# Patient Record
Sex: MALE | Race: WHITE | Employment: OTHER | URBAN - METROPOLITAN AREA
[De-identification: names, ages, dates, MRNs, and addresses within clinical notes are randomized per-mention and may not be internally consistent; named-entity substitution may affect disease eponyms.]

---

## 2019-05-26 ENCOUNTER — HOSPITAL ENCOUNTER (EMERGENCY)
Age: 79
Discharge: HOME | End: 2019-05-26
Attending: EMERGENCY MEDICINE
Payer: MEDICARE

## 2019-05-26 ENCOUNTER — APPOINTMENT (OUTPATIENT)
Dept: GENERAL RADIOLOGY | Age: 79
End: 2019-05-26
Payer: MEDICARE

## 2019-05-26 VITALS
DIASTOLIC BLOOD PRESSURE: 98 MMHG | HEIGHT: 67 IN | BODY MASS INDEX: 27.31 KG/M2 | RESPIRATION RATE: 18 BRPM | HEART RATE: 69 BPM | SYSTOLIC BLOOD PRESSURE: 184 MMHG | WEIGHT: 174 LBS | TEMPERATURE: 97.8 F | OXYGEN SATURATION: 94 %

## 2019-05-26 DIAGNOSIS — I50.9 CHRONIC CONGESTIVE HEART FAILURE, UNSPECIFIED HEART FAILURE TYPE (HCC): Primary | ICD-10-CM

## 2019-05-26 LAB
ANION GAP SERPL CALCULATED.3IONS-SCNC: 10 MMOL/L (ref 7–16)
APTT: 41.8 SEC (ref 24.5–35.1)
BASOPHILS ABSOLUTE: 0.04 E9/L (ref 0–0.2)
BASOPHILS RELATIVE PERCENT: 0.7 % (ref 0–2)
BUN BLDV-MCNC: 21 MG/DL (ref 8–23)
CALCIUM SERPL-MCNC: 9.4 MG/DL (ref 8.6–10.2)
CHLORIDE BLD-SCNC: 106 MMOL/L (ref 98–107)
CO2: 23 MMOL/L (ref 22–29)
CREAT SERPL-MCNC: 1.3 MG/DL (ref 0.7–1.2)
EKG ATRIAL RATE: 144 BPM
EKG Q-T INTERVAL: 484 MS
EKG QRS DURATION: 130 MS
EKG QTC CALCULATION (BAZETT): 514 MS
EKG R AXIS: 41 DEGREES
EKG T AXIS: -37 DEGREES
EKG VENTRICULAR RATE: 68 BPM
EOSINOPHILS ABSOLUTE: 0.12 E9/L (ref 0.05–0.5)
EOSINOPHILS RELATIVE PERCENT: 2.1 % (ref 0–6)
GFR AFRICAN AMERICAN: 59
GFR AFRICAN AMERICAN: >60
GFR NON-AFRICAN AMERICAN: 49 ML/MIN/1.73
GFR NON-AFRICAN AMERICAN: 53 ML/MIN/1.73
GLUCOSE BLD-MCNC: 117 MG/DL (ref 74–99)
GLUCOSE BLD-MCNC: 118 MG/DL (ref 74–99)
HCT VFR BLD CALC: 39 % (ref 37–54)
HEMOGLOBIN: 12.3 G/DL (ref 12.5–16.5)
IMMATURE GRANULOCYTES #: 0.04 E9/L
IMMATURE GRANULOCYTES %: 0.7 % (ref 0–5)
LYMPHOCYTES ABSOLUTE: 1.13 E9/L (ref 1.5–4)
LYMPHOCYTES RELATIVE PERCENT: 19.4 % (ref 20–42)
MCH RBC QN AUTO: 29.2 PG (ref 26–35)
MCHC RBC AUTO-ENTMCNC: 31.5 % (ref 32–34.5)
MCV RBC AUTO: 92.6 FL (ref 80–99.9)
MONOCYTES ABSOLUTE: 0.61 E9/L (ref 0.1–0.95)
MONOCYTES RELATIVE PERCENT: 10.5 % (ref 2–12)
NEUTROPHILS ABSOLUTE: 3.89 E9/L (ref 1.8–7.3)
NEUTROPHILS RELATIVE PERCENT: 66.6 % (ref 43–80)
PDW BLD-RTO: 15.7 FL (ref 11.5–15)
PERFORMED ON: ABNORMAL
PLATELET # BLD: 171 E9/L (ref 130–450)
PMV BLD AUTO: 11.2 FL (ref 7–12)
POC CHLORIDE: 113 MMOL/L (ref 100–108)
POC CREATININE: 1.4 MG/DL (ref 0.7–1.2)
POC POTASSIUM: 4.9 MMOL/L (ref 3.5–5)
POC SODIUM: 142 MMOL/L (ref 132–146)
POTASSIUM SERPL-SCNC: 5 MMOL/L (ref 3.5–5)
PRO-BNP: 8475 PG/ML (ref 0–450)
RBC # BLD: 4.21 E12/L (ref 3.8–5.8)
SODIUM BLD-SCNC: 139 MMOL/L (ref 132–146)
TROPONIN: <0.01 NG/ML (ref 0–0.03)
WBC # BLD: 5.8 E9/L (ref 4.5–11.5)

## 2019-05-26 PROCEDURE — 93010 ELECTROCARDIOGRAM REPORT: CPT | Performed by: INTERNAL MEDICINE

## 2019-05-26 PROCEDURE — 71045 X-RAY EXAM CHEST 1 VIEW: CPT

## 2019-05-26 PROCEDURE — 80048 BASIC METABOLIC PNL TOTAL CA: CPT

## 2019-05-26 PROCEDURE — 96374 THER/PROPH/DIAG INJ IV PUSH: CPT

## 2019-05-26 PROCEDURE — 85730 THROMBOPLASTIN TIME PARTIAL: CPT

## 2019-05-26 PROCEDURE — 82565 ASSAY OF CREATININE: CPT

## 2019-05-26 PROCEDURE — 99285 EMERGENCY DEPT VISIT HI MDM: CPT

## 2019-05-26 PROCEDURE — 84295 ASSAY OF SERUM SODIUM: CPT

## 2019-05-26 PROCEDURE — 82947 ASSAY GLUCOSE BLOOD QUANT: CPT

## 2019-05-26 PROCEDURE — 83880 ASSAY OF NATRIURETIC PEPTIDE: CPT

## 2019-05-26 PROCEDURE — 36415 COLL VENOUS BLD VENIPUNCTURE: CPT

## 2019-05-26 PROCEDURE — 84484 ASSAY OF TROPONIN QUANT: CPT

## 2019-05-26 PROCEDURE — 2580000003 HC RX 258: Performed by: EMERGENCY MEDICINE

## 2019-05-26 PROCEDURE — 93005 ELECTROCARDIOGRAM TRACING: CPT | Performed by: NURSE PRACTITIONER

## 2019-05-26 PROCEDURE — 82435 ASSAY OF BLOOD CHLORIDE: CPT

## 2019-05-26 PROCEDURE — 84132 ASSAY OF SERUM POTASSIUM: CPT

## 2019-05-26 PROCEDURE — 6360000002 HC RX W HCPCS: Performed by: EMERGENCY MEDICINE

## 2019-05-26 PROCEDURE — 85025 COMPLETE CBC W/AUTO DIFF WBC: CPT

## 2019-05-26 RX ORDER — FUROSEMIDE 10 MG/ML
40 INJECTION INTRAMUSCULAR; INTRAVENOUS ONCE
Status: COMPLETED | OUTPATIENT
Start: 2019-05-26 | End: 2019-05-26

## 2019-05-26 RX ORDER — FUROSEMIDE 40 MG/1
40 TABLET ORAL DAILY
Qty: 30 TABLET | Refills: 3 | Status: SHIPPED | OUTPATIENT
Start: 2019-05-26

## 2019-05-26 RX ORDER — SODIUM CHLORIDE 0.9 % (FLUSH) 0.9 %
10 SYRINGE (ML) INJECTION PRN
Status: DISCONTINUED | OUTPATIENT
Start: 2019-05-26 | End: 2019-05-26 | Stop reason: HOSPADM

## 2019-05-26 RX ADMIN — Medication 10 ML: at 10:12

## 2019-05-26 RX ADMIN — FUROSEMIDE 40 MG: 10 INJECTION, SOLUTION INTRAMUSCULAR; INTRAVENOUS at 10:12

## 2019-05-26 NOTE — ED NOTES
Bed: 14B-14  Expected date:   Expected time:   Means of arrival:   Comments:  triage     Ashley Campa RN  05/26/19 3613

## 2019-05-26 NOTE — ED PROVIDER NOTES
Department of Emergency Medicine   ED  Provider Note  Admit Date/RoomTime: 5/26/2019  8:55 AM  [facility]          History of Present Illness:  5/26/19, Time: 9:06 AM         Ruben Haney is a 78 y.o. male presenting to the ED for shortness of breath, beginning yesterday. The complaint has been persistent, moderate in severity, and worsened by nothing. He is usually on lasix for CHF but ran out on Friday and his pharmacy was unable to fill it for him. He denies any weight gain or orthopnea. Review of Systems:   Pertinent positives and negatives are stated within HPI, all other systems reviewed and are negative.        --------------------------------------------- PAST HISTORY ---------------------------------------------  Past Medical History:  has a past medical history of A-fib ([facility] A-fib ([facility] Arthritis, CAD (coronary artery disease), CHF (congestive heart failure) ([facility] Diastolic heart failure ([facility] Hyperlipidemia, Hypertension, and Unspecified cerebral artery occlusion with cerebral infarction. Past Surgical History:  has a past surgical history that includes Coronary angioplasty with stent (7-); Coronary angioplasty with stent (July 2014); Cardioversion (11-); Colonoscopy; hernia repair; Abdomen surgery; eye surgery; Appendectomy; and Carotid endarterectomy (Left, 04/16/2015). Social History:  reports that he quit smoking about 18 years ago. His smoking use included cigarettes. He started smoking about 63 years ago. He smoked 0.25 packs per day. He has never used smokeless tobacco. He reports that he drinks alcohol. He reports that he does not use drugs. Family History: family history includes Cancer in his sister; Heart Disease in his brother, brother, brother, father, sister, and sister. The patients home medications have been reviewed.     Allergies: Penicillins and Sulfa antibiotics        ---------------------------------------------------PHYSICAL EXAM--------------------------------------    Constitutional/General: Alert and oriented x3  Head: Normocephalic and atraumatic  Eyes: PERRL, EOMI, conjunctiva normal, sclera non icteric  Mouth: Oropharynx clear, handling secretions, no trismus, no asymmetry of the posterior oropharynx or uvular edema  Neck: Supple, full ROM, no stridor, no meningeal signs  Respiratory: Lungs clear to auscultation bilaterally, no wheezes, rales, or rhonchi. Not in respiratory distress  Cardiovascular:  Regular rate. Regular rhythm. No murmurs, no aortic murmurs, no gallops, or rubs. 2+ distal pulses. Equal extremity pulses. Chest: No chest wall tenderness  GI:  Abdomen Soft, Non tender, Non distended. +BS. No rebound, guarding, or rigidity. No pulsatile masses. Musculoskeletal: Moves all extremities x 4. Warm and well perfused, no clubbing, cyanosis, or edema. Capillary refill <3 seconds  Integument: skin warm and dry. No rashes. Neurologic: GCS 15, no focal deficits, symmetric strength 5/5 in the upper and lower extremities bilaterally  Psychiatric: Normal Affect          -------------------------------------------------- RESULTS -------------------------------------------------  I have personally reviewed all laboratory and imaging results for this patient. Results are listed below.      LABS:  Results for orders placed or performed during the hospital encounter of 05/26/19   CBC auto differential   Result Value Ref Range    WBC 5.8 4.5 - 11.5 E9/L    RBC 4.21 3.80 - 5.80 E12/L    Hemoglobin 12.3 (L) 12.5 - 16.5 g/dL    Hematocrit 39.0 37.0 - 54.0 %    MCV 92.6 80.0 - 99.9 fL    MCH 29.2 26.0 - 35.0 pg    MCHC 31.5 (L) 32.0 - 34.5 %    RDW 15.7 (H) 11.5 - 15.0 fL    Platelets 940 744 - 238 E9/L    MPV 11.2 7.0 - 12.0 fL    Neutrophils % 66.6 43.0 - 80.0 %    Immature Granulocytes % 0.7 0.0 - 5.0 %    Lymphocytes % 19.4 (L) 20.0 - 42.0 %    Monocytes % 10.5 2.0 - 12.0 %    Eosinophils % 2.1 0.0 - 6.0 %    Basophils % 0.7 0.0 - 2.0 %    Neutrophils # 3.89 1.80 - 7.30 E9/L    Immature Granulocytes # 0.04 E9/L    Lymphocytes # 1.13 (L) 1.50 - 4.00 E9/L    Monocytes # 0.61 0.10 - 0.95 E9/L    Eosinophils # 0.12 0.05 - 0.50 E9/L    Basophils # 0.04 0.00 - 0.20 A8/X   Basic metabolic panel   Result Value Ref Range    Sodium 139 132 - 146 mmol/L    Potassium 5.0 3.5 - 5.0 mmol/L    Chloride 106 98 - 107 mmol/L    CO2 23 22 - 29 mmol/L    Anion Gap 10 7 - 16 mmol/L    Glucose 117 (H) 74 - 99 mg/dL    BUN 21 8 - 23 mg/dL    CREATININE 1.3 (H) 0.7 - 1.2 mg/dL    GFR Non-African American 53 >=60 mL/min/1.73    GFR African American >60     Calcium 9.4 8.6 - 10.2 mg/dL   Troponin   Result Value Ref Range    Troponin <0.01 0.00 - 0.03 ng/mL   Brain Natriuretic Peptide   Result Value Ref Range    Pro-BNP 8,475 (H) 0 - 450 pg/mL   APTT   Result Value Ref Range    aPTT 41.8 (H) 24.5 - 35.1 sec   POCT Venous   Result Value Ref Range    POC Sodium 142 132 - 146 mmol/L    POC Potassium 4.9 3.5 - 5.0 mmol/L    POC Chloride 113 (H) 100 - 108 mmol/L    POC Glucose 118 (H) 74 - 99 mg/dl    POC Creatinine 1.4 (H) 0.7 - 1.2 mg/dL    GFR Non-African American 49 >=60 mL/min/1.73    GFR  59     Performed on SEE BELOW    EKG 12 Lead   Result Value Ref Range    Ventricular Rate 68 BPM    Atrial Rate 144 BPM    QRS Duration 130 ms    Q-T Interval 484 ms    QTc Calculation (Bazett) 514 ms    R Axis 41 degrees    T Axis -37 degrees       RADIOLOGY:  Interpreted by Radiologist unless otherwise specified  XR CHEST 1 VW   Final Result   Cardiomegaly   Tortuous aorta   There are patchy infiltrates seen throughout both the lung fields.    Pulmonary vascular congestion could have this appearance   There is a mild infiltrate at the right lung                      EKG Interpretation    Interpreted by emergency department physician    Rhythm: normal sinus   Rate: normal  Axis: normal  Ectopy: none  Conduction: normal  ST Segments: no acute change  T Waves: no computerized transcription errors may be present       Raghu Lawson DO  Resident  05/26/19 1100

## 2019-05-26 NOTE — ED NOTES
The patient was ambulated down the abrth with no assistance needed, SP02 was 97 percent and HR was 80 bpm, the patient denied any chest pain, shortness of breath, also denied any dizziness or lightheadedness, no palpitations, dr. Robert Cobb and dr. Erika Campoverde notified      Edi Miguel RN  05/26/19 1189

## 2019-10-18 DIAGNOSIS — I65.23 BILATERAL CAROTID ARTERY STENOSIS: Primary | ICD-10-CM

## 2019-11-05 ENCOUNTER — HOSPITAL ENCOUNTER (OUTPATIENT)
Dept: CARDIOLOGY | Age: 79
Discharge: HOME | End: 2019-11-05
Payer: MEDICARE

## 2019-11-05 ENCOUNTER — OFFICE VISIT (OUTPATIENT)
Dept: VASCULAR SURGERY | Age: 79
End: 2019-11-05
Payer: MEDICARE

## 2019-11-05 VITALS
DIASTOLIC BLOOD PRESSURE: 82 MMHG | HEIGHT: 69 IN | WEIGHT: 169 LBS | BODY MASS INDEX: 25.03 KG/M2 | SYSTOLIC BLOOD PRESSURE: 120 MMHG

## 2019-11-05 DIAGNOSIS — I65.23 BILATERAL CAROTID ARTERY STENOSIS: ICD-10-CM

## 2019-11-05 DIAGNOSIS — I65.23 CAROTID STENOSIS, ASYMPTOMATIC, BILATERAL: Primary | ICD-10-CM

## 2019-11-05 PROCEDURE — 93880 EXTRACRANIAL BILAT STUDY: CPT

## 2019-11-05 PROCEDURE — 99213 OFFICE O/P EST LOW 20 MIN: CPT | Performed by: PHYSICIAN ASSISTANT

## 2021-10-21 ENCOUNTER — TELEPHONE (OUTPATIENT)
Dept: VASCULAR SURGERY | Age: 81
End: 2021-10-21

## 2021-10-27 ENCOUNTER — TELEPHONE (OUTPATIENT)
Dept: VASCULAR SURGERY | Age: 81
End: 2021-10-27

## 2021-10-27 DIAGNOSIS — I65.23 CAROTID STENOSIS, ASYMPTOMATIC, BILATERAL: Primary | ICD-10-CM

## 2021-10-27 NOTE — TELEPHONE ENCOUNTER
Notified patient's son Lynne Speaker of appointment for US at [facility] on 11-17-21 at 2:00 pm, arrive at 1:30 pm to register, see Dr. Saba Benavidez on 11-23-21 at 10:15 am.

## 2021-11-17 ENCOUNTER — HOSPITAL ENCOUNTER (OUTPATIENT)
Dept: ULTRASOUND IMAGING | Age: 81
Discharge: HOME | End: 2021-11-19
Payer: MEDICARE

## 2021-11-17 DIAGNOSIS — I65.23 CAROTID STENOSIS, ASYMPTOMATIC, BILATERAL: ICD-10-CM

## 2021-11-17 PROCEDURE — 93880 EXTRACRANIAL BILAT STUDY: CPT

## 2021-11-22 ENCOUNTER — TELEPHONE (OUTPATIENT)
Dept: VASCULAR SURGERY | Age: 81
End: 2021-11-22

## 2021-11-22 NOTE — TELEPHONE ENCOUNTER
Called to confirm appointment for 11/23/21 at 1015 a.m, left message with date, time, and phone number for patient.

## 2021-11-23 ENCOUNTER — OFFICE VISIT (OUTPATIENT)
Dept: VASCULAR SURGERY | Age: 81
End: 2021-11-23
Payer: MEDICARE

## 2021-11-23 VITALS — BODY MASS INDEX: 26.84 KG/M2 | WEIGHT: 167 LBS | HEIGHT: 66 IN

## 2021-11-23 DIAGNOSIS — I65.23 CAROTID STENOSIS, ASYMPTOMATIC, BILATERAL: Primary | ICD-10-CM

## 2021-11-23 PROCEDURE — 99212 OFFICE O/P EST SF 10 MIN: CPT | Performed by: NURSE PRACTITIONER

## 2021-11-23 NOTE — PROGRESS NOTES
Vascular Surgery Outpatient Progress Note      Chief Complaint   Patient presents with    Circulatory Problem     carotid stenosis asymptomatic bilateral       HISTORY OF PRESENT ILLNESS:                The patient is a 80 y.o. male who returns for follow-up evaluation of carotid artery disease and previous L CEA in 2015. He denies any symptoms of stroke, ministroke, or amaurosis fugax. He denies any recent hospital admission, major illness, or surgery since the last office visit. He had a recent carotid ultrasound that was reviewed for today's visit. Past Medical History:        Diagnosis Date    A-fib ([address]     A-fib ([address]     presently on coumadin    Arthritis     CAD (coronary artery disease)     [facility]     Diastolic heart failure ([address] 7/23/14 7/23/14- echocardiogram revealed an LVEF of 55-60%, left atrium severely dilated, mild mitral and mild tricuspid regurgitation    Hyperlipidemia     Hypertension     Unspecified cerebral artery occlusion with cerebral infarction      Past Surgical History:        Procedure Laterality Date    ABDOMEN SURGERY      PERFORATED BOWEL    APPENDECTOMY      CARDIOVERSION  11-    Dr. Janelle Puri 200 joules    CAROTID ENDARTERECTOMY Left 04/16/2015    Vasoguard patch, [place]    COLONOSCOPY      CORONARY ANGIOPLASTY WITH STENT PLACEMENT  7-    3.0 x 26mm Integrity to Prox circ   Dr. Latina Schirmer  July 2014    EYE SURGERY      CATARACT BILATERALLY    HERNIA REPAIR       Current Medications:   Prior to Admission medications    Medication Sig Start Date End Date Taking? Authorizing Provider   furosemide (LASIX) 40 MG tablet Take 1 tablet by mouth daily 5/26/19  Yes Wyona Reveal, DO   losartan (COZAAR) 50 MG tablet Take 50 mg by mouth daily  7/30/15  Yes Historical Provider, MD   aspirin EC 81 MG EC tablet Take 81 mg by mouth daily.    Yes Historical Provider, MD furosemide (LASIX) 40 MG tablet Take 1 tablet by mouth daily. 8/10/14  Yes Jamin Carrasco DO   potassium chloride SA (K-DUR;KLOR-CON M) 20 MEQ tablet Take 1 tablet by mouth daily (with breakfast). 8/10/14  Yes Jamin Carrasco DO   warfarin (COUMADIN) 2.5 MG tablet Take 1 tablet by mouth daily. Patient taking differently: Take 2.5 mg by mouth daily. Indications: TAKES 5 MG ON , MONDAY, WEDNESDAY, THURSDAY AND SATURDAY. TAKES 2.5 MG ON TUESDAY AND 14  Yes Jamin Carrasco DO   atorvastatin (LIPITOR) 20 MG tablet Take 1 tablet by mouth nightly. 14  Yes Jamin Carrasco DO   lisinopril (PRINIVIL;ZESTRIL) 5 MG tablet Take 1 tablet by mouth daily. 14  Yes Jamin Carrasco DO   metoprolol (TOPROL-XL) 50 MG XL tablet Take 1 tablet by mouth every evening. Patient taking differently: Take 50 mg by mouth daily. 14  Yes Jamin Carrasco DO   pantoprazole (PROTONIX) 40 MG tablet Take 1 tablet by mouth every morning (before breakfast). 14  Yes Jamin Carrasco DO   Vitamin D (CHOLECALCIFEROL) 1000 UNITS CAPS capsule Take 1,000 Units by mouth daily. Yes Historical Provider, MD     Allergies:  Penicillins and Sulfa antibiotics    Social History     Socioeconomic History    Marital status:       Spouse name: Not on file    Number of children: Not on file    Years of education: Not on file    Highest education level: Not on file   Occupational History    Not on file   Tobacco Use    Smoking status: Former Smoker     Packs/day: 0.25     Types: Cigarettes     Start date: 1955     Quit date: 2001     Years since quittin.8    Smokeless tobacco: Never Used   Substance and Sexual Activity    Alcohol use: Yes     Comment: glass of wine occasionally    Drug use: No    Sexual activity: Never   Other Topics Concern    Not on file   Social History Narrative    ** Merged History Encounter **          Social Determinants of Health     Financial Resource Strain:     Difficulty of Paying Living Expenses: Not on file   Food Insecurity:     Worried About [facility] in the Last Year: Not on file    Ran Out of Food in the Last Year: Not on file   Transportation Needs:     Lack of Transportation (Medical): Not on file    Lack of Transportation (Non-Medical):  Not on file   Physical Activity:     Days of Exercise per Week: Not on file    Minutes of Exercise per Session: Not on file   Stress:     Feeling of Stress : Not on file   Social Connections:     Frequency of Communication with Friends and Family: Not on file    Frequency of Social Gatherings with Friends and Family: Not on file    Attends Mosque Services: Not on file    Active Member of [address] [place] [address] or Organizations: Not on file    Attends Club or Organization Meetings: Not on file    Marital Status: Not on file   Intimate Partner Violence:     Fear of Current or Ex-Partner: Not on file    Emotionally Abused: Not on file    Physically Abused: Not on file    Sexually Abused: Not on file   Housing Stability:     Unable to Pay for Housing in the Last Year: Not on file    Number of [place] in the Last Year: Not on file    Unstable Housing in the Last Year: Not on file        Family History   Problem Relation Age of Onset    Heart Disease Father     Cancer Sister     Heart Disease Sister     Heart Disease Brother     Heart Disease Sister     Heart Disease Brother     Heart Disease Brother        REVIEW OF SYSTEMS (New symptoms):    Eyes:      Blurred vision:  No [x]/Yes []               Diplopia:   No [x]/Yes []               Vision loss:       No [x]/Yes []   Ears, nose, throat:             Hearing loss:    No [x]/Yes []      Vertigo:   No [x]/Yes []                       Swallowing problem:  No [x]/Yes []               Nose bleeds:   No [x]/Yes []      Voice hoarseness:  No [x]/Yes []  Respiratory:             Cough:   No [x]/Yes []      Pleuritic chest pain:  No [x]/Yes []                        Dyspnea: No [x]/Yes []      Wheezing:   No [x]/Yes []  Cardiovascular:             Angina:   No [x]/Yes []      Palpitations:   No [x]/Yes []          Claudication:    No [x]/Yes []      Leg swelling:   No [x]/Yes []  Gastrointestinal:             Nausea or vomiting:  No [x]/Yes []               Abdominal pain:  No [x]/Yes []                     Intestinal bleeding: No [x]/Yes []  Musculoskeletal:             Leg pain:   No [x]/Yes []      Back pain:   No [x]/Yes []                    Weakness:   No [x]/Yes []  Neurologic:             Numbness:   No [x]/Yes []      Paralysis:   No [x]/Yes []                       Headaches:   No [x]/Yes []  Hematologic, lymphatic:   Anemia:   No [x]/Yes []              Bleeding or bruising:  No [x]/Yes []              Fevers or chills: No [x]/Yes []  Endocrine:             Temp intolerance:   No [x]/Yes []                       Polydipsia, polyuria:  No [x]/Yes []  Skin:              Rash:    No [x]/Yes []      Ulcers:   No [x]/Yes []              Abnorm pigment: No [x]/Yes []  :              Frequency/urgency:  No [x]/Yes []      Hematuria:    No [x]/Yes []                      Incontinence:    No [x]/Yes []    PHYSICAL EXAM:  There were no vitals filed for this visit.   General Appearance: alert and oriented to person, place and time, in no acute distress, well developed and well- nourished  Neurologic: speech normal  Head: normocephalic and atraumatic  Eyes: extraocular eye movements intact, conjunctivae normal  ENT: external ear and ear canal normal bilaterally, nose without deformity  Pulmonary/Chest: normal air movement, no respiratory distress  Cardiovascular: normal rate, regular rhythm  Abdomen: non-distended, no masses  Musculoskeletal: no joint deformity or tenderness  Extremities: no leg edema bilaterally  Skin: warm and dry, no rash or erythema    PULSE EXAM      Right      Left   Brachial     Radial 2 2   Femoral     Popliteal     Dorsalis Pedis     Posterior Tibial (3=normal, 2=diminished, 1=barely palpable, 4=widened)    RADIOLOGY: Carotid US:    Problem List Items Addressed This Visit     Carotid stenosis, asymptomatic, bilateral - Primary        I reviewed the results of the ultrasound with the patient. Less than 50% stenosis of the carotid arteries bilaterally. The patient's ultrasound has been stable for several years. At this point I do not feel that his carotid stenosis will cause him any problems in the future. I will not schedule him a follow up appointment, but asked him to call back with any problems. Pt seen and plan reviewed with Dr. Yang Sousa. KOSTA Escobar - CNP      Return if symptoms worsen or fail to improve.

## 2022-11-24 ENCOUNTER — APPOINTMENT (OUTPATIENT)
Dept: GENERAL RADIOLOGY | Age: 82
DRG: 291 | End: 2022-11-24
Payer: MEDICARE

## 2022-11-24 ENCOUNTER — HOSPITAL ENCOUNTER (INPATIENT)
Age: 82
LOS: 2 days | Discharge: HOME | DRG: 291 | End: 2022-11-26
Attending: EMERGENCY MEDICINE | Admitting: STUDENT IN AN ORGANIZED HEALTH CARE EDUCATION/TRAINING PROGRAM
Payer: MEDICARE

## 2022-11-24 DIAGNOSIS — Z79.01 CHRONIC ANTICOAGULATION: ICD-10-CM

## 2022-11-24 DIAGNOSIS — N17.9 AKI (ACUTE KIDNEY INJURY) (HCC): ICD-10-CM

## 2022-11-24 DIAGNOSIS — R79.1 SUPRATHERAPEUTIC INR: ICD-10-CM

## 2022-11-24 DIAGNOSIS — I48.11 LONGSTANDING PERSISTENT ATRIAL FIBRILLATION (HCC): ICD-10-CM

## 2022-11-24 DIAGNOSIS — I50.9 CONGESTIVE HEART FAILURE, UNSPECIFIED HF CHRONICITY, UNSPECIFIED HEART FAILURE TYPE (HCC): Primary | ICD-10-CM

## 2022-11-24 PROBLEM — D64.9 ANEMIA: Status: ACTIVE | Noted: 2022-11-24

## 2022-11-24 PROBLEM — I50.33 ACUTE ON CHRONIC DIASTOLIC CHF (CONGESTIVE HEART FAILURE) (HCC): Status: ACTIVE | Noted: 2022-11-24

## 2022-11-24 LAB
ALBUMIN SERPL-MCNC: 3.5 G/DL (ref 3.5–5.2)
ALP BLD-CCNC: 61 U/L (ref 40–129)
ALT SERPL-CCNC: 19 U/L (ref 0–40)
ANION GAP SERPL CALCULATED.3IONS-SCNC: 11 MMOL/L (ref 7–16)
APTT: 41.6 SEC (ref 24.5–35.1)
AST SERPL-CCNC: 19 U/L (ref 0–39)
BASOPHILS ABSOLUTE: 0.05 E9/L (ref 0–0.2)
BASOPHILS RELATIVE PERCENT: 0.6 % (ref 0–2)
BILIRUB SERPL-MCNC: 0.5 MG/DL (ref 0–1.2)
BUN BLDV-MCNC: 31 MG/DL (ref 6–23)
CALCIUM SERPL-MCNC: 9.8 MG/DL (ref 8.6–10.2)
CHLORIDE BLD-SCNC: 100 MMOL/L (ref 98–107)
CO2: 26 MMOL/L (ref 22–29)
CREAT SERPL-MCNC: 1.6 MG/DL (ref 0.7–1.2)
EOSINOPHILS ABSOLUTE: 0.1 E9/L (ref 0.05–0.5)
EOSINOPHILS RELATIVE PERCENT: 1.2 % (ref 0–6)
GFR SERPL CREATININE-BSD FRML MDRD: 43 ML/MIN/1.73
GLUCOSE BLD-MCNC: 157 MG/DL (ref 74–99)
HCT VFR BLD CALC: 36.7 % (ref 37–54)
HEMOGLOBIN: 11.9 G/DL (ref 12.5–16.5)
IMMATURE GRANULOCYTES #: 0.04 E9/L
IMMATURE GRANULOCYTES %: 0.5 % (ref 0–5)
INFLUENZA A BY PCR: NOT DETECTED
INFLUENZA B BY PCR: NOT DETECTED
INR BLD: 3.8
LYMPHOCYTES ABSOLUTE: 1.56 E9/L (ref 1.5–4)
LYMPHOCYTES RELATIVE PERCENT: 18.5 % (ref 20–42)
MCH RBC QN AUTO: 29.8 PG (ref 26–35)
MCHC RBC AUTO-ENTMCNC: 32.4 % (ref 32–34.5)
MCV RBC AUTO: 92 FL (ref 80–99.9)
MONOCYTES ABSOLUTE: 0.87 E9/L (ref 0.1–0.95)
MONOCYTES RELATIVE PERCENT: 10.3 % (ref 2–12)
NEUTROPHILS ABSOLUTE: 5.81 E9/L (ref 1.8–7.3)
NEUTROPHILS RELATIVE PERCENT: 68.9 % (ref 43–80)
PDW BLD-RTO: 14.3 FL (ref 11.5–15)
PLATELET # BLD: 246 E9/L (ref 130–450)
PMV BLD AUTO: 10.5 FL (ref 7–12)
POTASSIUM SERPL-SCNC: 4.8 MMOL/L (ref 3.5–5)
PRO-BNP: 7773 PG/ML (ref 0–450)
PROTHROMBIN TIME: 41.6 SEC (ref 9.3–12.4)
RBC # BLD: 3.99 E12/L (ref 3.8–5.8)
RSV BY PCR: NEGATIVE
SARS-COV-2, NAAT: NOT DETECTED
SODIUM BLD-SCNC: 137 MMOL/L (ref 132–146)
TOTAL PROTEIN: 7.6 G/DL (ref 6.4–8.3)
TROPONIN, HIGH SENSITIVITY: 29 NG/L (ref 0–11)
TROPONIN, HIGH SENSITIVITY: 31 NG/L (ref 0–11)
WBC # BLD: 8.4 E9/L (ref 4.5–11.5)

## 2022-11-24 PROCEDURE — 80053 COMPREHEN METABOLIC PANEL: CPT

## 2022-11-24 PROCEDURE — 71045 X-RAY EXAM CHEST 1 VIEW: CPT

## 2022-11-24 PROCEDURE — 36415 COLL VENOUS BLD VENIPUNCTURE: CPT

## 2022-11-24 PROCEDURE — 87635 SARS-COV-2 COVID-19 AMP PRB: CPT

## 2022-11-24 PROCEDURE — 85730 THROMBOPLASTIN TIME PARTIAL: CPT

## 2022-11-24 PROCEDURE — 85610 PROTHROMBIN TIME: CPT

## 2022-11-24 PROCEDURE — 83880 ASSAY OF NATRIURETIC PEPTIDE: CPT

## 2022-11-24 PROCEDURE — 87807 RSV ASSAY W/OPTIC: CPT

## 2022-11-24 PROCEDURE — APPSS45 APP SPLIT SHARED TIME 31-45 MINUTES: Performed by: NURSE PRACTITIONER

## 2022-11-24 PROCEDURE — 93005 ELECTROCARDIOGRAM TRACING: CPT

## 2022-11-24 PROCEDURE — 85025 COMPLETE CBC W/AUTO DIFF WBC: CPT

## 2022-11-24 PROCEDURE — 87502 INFLUENZA DNA AMP PROBE: CPT

## 2022-11-24 PROCEDURE — 99285 EMERGENCY DEPT VISIT HI MDM: CPT

## 2022-11-24 PROCEDURE — 6360000002 HC RX W HCPCS

## 2022-11-24 PROCEDURE — 1200000000 HC SEMI PRIVATE

## 2022-11-24 PROCEDURE — 84484 ASSAY OF TROPONIN QUANT: CPT

## 2022-11-24 PROCEDURE — 96374 THER/PROPH/DIAG INJ IV PUSH: CPT

## 2022-11-24 RX ORDER — FUROSEMIDE 10 MG/ML
40 INJECTION INTRAMUSCULAR; INTRAVENOUS ONCE
Status: COMPLETED | OUTPATIENT
Start: 2022-11-24 | End: 2022-11-24

## 2022-11-24 RX ADMIN — FUROSEMIDE 40 MG: 10 INJECTION, SOLUTION INTRAMUSCULAR; INTRAVENOUS at 23:35

## 2022-11-24 ASSESSMENT — ENCOUNTER SYMPTOMS
DIARRHEA: 0
EYE DISCHARGE: 0
CONSTIPATION: 0
RHINORRHEA: 0
ABDOMINAL DISTENTION: 0
BACK PAIN: 0
ABDOMINAL PAIN: 0
NAUSEA: 0
SHORTNESS OF BREATH: 1
EYE REDNESS: 0
COUGH: 1
SORE THROAT: 0
SINUS PRESSURE: 0
VOMITING: 0
BLOOD IN STOOL: 0
PHOTOPHOBIA: 0
WHEEZING: 0

## 2022-11-25 PROBLEM — I50.9 CONGESTIVE HEART FAILURE (HCC): Status: ACTIVE | Noted: 2022-11-25

## 2022-11-25 PROBLEM — N17.9 AKI (ACUTE KIDNEY INJURY) (HCC): Status: ACTIVE | Noted: 2022-11-25

## 2022-11-25 LAB
ANION GAP SERPL CALCULATED.3IONS-SCNC: 13 MMOL/L (ref 7–16)
BASOPHILS ABSOLUTE: 0.06 E9/L (ref 0–0.2)
BASOPHILS RELATIVE PERCENT: 0.8 % (ref 0–2)
BUN BLDV-MCNC: 28 MG/DL (ref 6–23)
CALCIUM SERPL-MCNC: 9.7 MG/DL (ref 8.6–10.2)
CHLORIDE BLD-SCNC: 102 MMOL/L (ref 98–107)
CO2: 24 MMOL/L (ref 22–29)
CREAT SERPL-MCNC: 1.4 MG/DL (ref 0.7–1.2)
EKG ATRIAL RATE: 288 BPM
EKG Q-T INTERVAL: 468 MS
EKG QRS DURATION: 150 MS
EKG QTC CALCULATION (BAZETT): 478 MS
EKG R AXIS: 36 DEGREES
EKG T AXIS: 4 DEGREES
EKG VENTRICULAR RATE: 63 BPM
EOSINOPHILS ABSOLUTE: 0.13 E9/L (ref 0.05–0.5)
EOSINOPHILS RELATIVE PERCENT: 1.8 % (ref 0–6)
GFR SERPL CREATININE-BSD FRML MDRD: 50 ML/MIN/1.73
GLUCOSE BLD-MCNC: 116 MG/DL (ref 74–99)
HCT VFR BLD CALC: 37.9 % (ref 37–54)
HEMOGLOBIN: 11.9 G/DL (ref 12.5–16.5)
IMMATURE GRANULOCYTES #: 0.03 E9/L
IMMATURE GRANULOCYTES %: 0.4 % (ref 0–5)
INR BLD: 3.3
LYMPHOCYTES ABSOLUTE: 1.52 E9/L (ref 1.5–4)
LYMPHOCYTES RELATIVE PERCENT: 21.1 % (ref 20–42)
MCH RBC QN AUTO: 28.5 PG (ref 26–35)
MCHC RBC AUTO-ENTMCNC: 31.4 % (ref 32–34.5)
MCV RBC AUTO: 90.7 FL (ref 80–99.9)
MONOCYTES ABSOLUTE: 0.95 E9/L (ref 0.1–0.95)
MONOCYTES RELATIVE PERCENT: 13.2 % (ref 2–12)
NEUTROPHILS ABSOLUTE: 4.52 E9/L (ref 1.8–7.3)
NEUTROPHILS RELATIVE PERCENT: 62.7 % (ref 43–80)
PDW BLD-RTO: 14.3 FL (ref 11.5–15)
PLATELET # BLD: 255 E9/L (ref 130–450)
PMV BLD AUTO: 10.4 FL (ref 7–12)
POTASSIUM REFLEX MAGNESIUM: 4 MMOL/L (ref 3.5–5)
PROTHROMBIN TIME: 37.2 SEC (ref 9.3–12.4)
RBC # BLD: 4.18 E12/L (ref 3.8–5.8)
SODIUM BLD-SCNC: 139 MMOL/L (ref 132–146)
WBC # BLD: 7.2 E9/L (ref 4.5–11.5)

## 2022-11-25 PROCEDURE — 36415 COLL VENOUS BLD VENIPUNCTURE: CPT

## 2022-11-25 PROCEDURE — 2580000003 HC RX 258: Performed by: STUDENT IN AN ORGANIZED HEALTH CARE EDUCATION/TRAINING PROGRAM

## 2022-11-25 PROCEDURE — 6370000000 HC RX 637 (ALT 250 FOR IP): Performed by: INTERNAL MEDICINE

## 2022-11-25 PROCEDURE — 93306 TTE W/DOPPLER COMPLETE: CPT

## 2022-11-25 PROCEDURE — 99233 SBSQ HOSP IP/OBS HIGH 50: CPT | Performed by: INTERNAL MEDICINE

## 2022-11-25 PROCEDURE — 99222 1ST HOSP IP/OBS MODERATE 55: CPT | Performed by: STUDENT IN AN ORGANIZED HEALTH CARE EDUCATION/TRAINING PROGRAM

## 2022-11-25 PROCEDURE — 6370000000 HC RX 637 (ALT 250 FOR IP): Performed by: STUDENT IN AN ORGANIZED HEALTH CARE EDUCATION/TRAINING PROGRAM

## 2022-11-25 PROCEDURE — 93010 ELECTROCARDIOGRAM REPORT: CPT | Performed by: INTERNAL MEDICINE

## 2022-11-25 PROCEDURE — 85025 COMPLETE CBC W/AUTO DIFF WBC: CPT

## 2022-11-25 PROCEDURE — 80048 BASIC METABOLIC PNL TOTAL CA: CPT

## 2022-11-25 PROCEDURE — 6360000004 HC RX CONTRAST MEDICATION: Performed by: STUDENT IN AN ORGANIZED HEALTH CARE EDUCATION/TRAINING PROGRAM

## 2022-11-25 PROCEDURE — 85610 PROTHROMBIN TIME: CPT

## 2022-11-25 PROCEDURE — 1200000000 HC SEMI PRIVATE

## 2022-11-25 RX ORDER — PANTOPRAZOLE SODIUM 40 MG/1
40 TABLET, DELAYED RELEASE ORAL
Status: DISCONTINUED | OUTPATIENT
Start: 2022-11-25 | End: 2022-11-26 | Stop reason: HOSPADM

## 2022-11-25 RX ORDER — SODIUM CHLORIDE 0.9 % (FLUSH) 0.9 %
5-40 SYRINGE (ML) INJECTION EVERY 12 HOURS SCHEDULED
Status: DISCONTINUED | OUTPATIENT
Start: 2022-11-25 | End: 2022-11-26 | Stop reason: HOSPADM

## 2022-11-25 RX ORDER — SODIUM CHLORIDE 0.9 % (FLUSH) 0.9 %
5-40 SYRINGE (ML) INJECTION PRN
Status: DISCONTINUED | OUTPATIENT
Start: 2022-11-25 | End: 2022-11-26 | Stop reason: HOSPADM

## 2022-11-25 RX ORDER — METOPROLOL SUCCINATE 50 MG/1
50 TABLET, EXTENDED RELEASE ORAL DAILY
Status: DISCONTINUED | OUTPATIENT
Start: 2022-11-25 | End: 2022-11-26 | Stop reason: HOSPADM

## 2022-11-25 RX ORDER — ASPIRIN 81 MG/1
81 TABLET ORAL DAILY
Status: DISCONTINUED | OUTPATIENT
Start: 2022-11-25 | End: 2022-11-26 | Stop reason: HOSPADM

## 2022-11-25 RX ORDER — WARFARIN SODIUM 2.5 MG/1
2.5 TABLET ORAL DAILY
Status: DISCONTINUED | OUTPATIENT
Start: 2022-11-25 | End: 2022-11-25

## 2022-11-25 RX ORDER — ONDANSETRON 2 MG/ML
4 INJECTION INTRAMUSCULAR; INTRAVENOUS EVERY 6 HOURS PRN
Status: DISCONTINUED | OUTPATIENT
Start: 2022-11-25 | End: 2022-11-26 | Stop reason: HOSPADM

## 2022-11-25 RX ORDER — DOXYCYCLINE HYCLATE 100 MG/1
100 CAPSULE ORAL EVERY 12 HOURS SCHEDULED
Status: DISCONTINUED | OUTPATIENT
Start: 2022-11-25 | End: 2022-11-26 | Stop reason: HOSPADM

## 2022-11-25 RX ORDER — ATORVASTATIN CALCIUM 20 MG/1
20 TABLET, FILM COATED ORAL NIGHTLY
Status: DISCONTINUED | OUTPATIENT
Start: 2022-11-25 | End: 2022-11-26 | Stop reason: HOSPADM

## 2022-11-25 RX ORDER — ACETAMINOPHEN 650 MG/1
650 SUPPOSITORY RECTAL EVERY 6 HOURS PRN
Status: DISCONTINUED | OUTPATIENT
Start: 2022-11-25 | End: 2022-11-26 | Stop reason: HOSPADM

## 2022-11-25 RX ORDER — ONDANSETRON 4 MG/1
4 TABLET, ORALLY DISINTEGRATING ORAL EVERY 8 HOURS PRN
Status: DISCONTINUED | OUTPATIENT
Start: 2022-11-25 | End: 2022-11-26 | Stop reason: HOSPADM

## 2022-11-25 RX ORDER — WARFARIN SODIUM 2.5 MG/1
1.25 TABLET ORAL
Status: COMPLETED | OUTPATIENT
Start: 2022-11-25 | End: 2022-11-25

## 2022-11-25 RX ORDER — SODIUM CHLORIDE 9 MG/ML
INJECTION, SOLUTION INTRAVENOUS PRN
Status: DISCONTINUED | OUTPATIENT
Start: 2022-11-25 | End: 2022-11-26 | Stop reason: HOSPADM

## 2022-11-25 RX ORDER — POLYETHYLENE GLYCOL 3350 17 G/17G
17 POWDER, FOR SOLUTION ORAL DAILY PRN
Status: DISCONTINUED | OUTPATIENT
Start: 2022-11-25 | End: 2022-11-26 | Stop reason: HOSPADM

## 2022-11-25 RX ORDER — ACETAMINOPHEN 325 MG/1
650 TABLET ORAL EVERY 6 HOURS PRN
Status: DISCONTINUED | OUTPATIENT
Start: 2022-11-25 | End: 2022-11-26 | Stop reason: HOSPADM

## 2022-11-25 RX ADMIN — METOPROLOL SUCCINATE 50 MG: 50 TABLET, EXTENDED RELEASE ORAL at 09:47

## 2022-11-25 RX ADMIN — DOXYCYCLINE HYCLATE 100 MG: 100 CAPSULE ORAL at 21:02

## 2022-11-25 RX ADMIN — Medication 10 ML: at 21:02

## 2022-11-25 RX ADMIN — WARFARIN SODIUM 1.25 MG: 2.5 TABLET ORAL at 17:24

## 2022-11-25 RX ADMIN — Medication 10 ML: at 09:47

## 2022-11-25 RX ADMIN — ASPIRIN 81 MG: 81 TABLET, COATED ORAL at 09:47

## 2022-11-25 RX ADMIN — DOXYCYCLINE HYCLATE 100 MG: 100 CAPSULE ORAL at 11:03

## 2022-11-25 RX ADMIN — PERFLUTREN 1.5 ML: 6.52 INJECTION, SUSPENSION INTRAVENOUS at 08:15

## 2022-11-25 RX ADMIN — PANTOPRAZOLE SODIUM 40 MG: 40 TABLET, DELAYED RELEASE ORAL at 06:00

## 2022-11-25 RX ADMIN — ATORVASTATIN CALCIUM 20 MG: 20 TABLET, FILM COATED ORAL at 21:02

## 2022-11-25 ASSESSMENT — PAIN - FUNCTIONAL ASSESSMENT: PAIN_FUNCTIONAL_ASSESSMENT: NONE - DENIES PAIN

## 2022-11-25 ASSESSMENT — PAIN SCALES - GENERAL: PAINLEVEL_OUTOF10: 0

## 2022-11-25 NOTE — PROGRESS NOTES
Called the patient's son Simon Tellez, home medications are updated to the best of this RN's capability.

## 2022-11-25 NOTE — ED NOTES
The following labs were labeled with appropriate pt sticker and tubed to lab:     [x] Blue     [x] Lavender   [] on ice  [x] Green/yellow  [] Green/black [] on ice  [] Loran [place]  [] on ice  [] Yellow  [] Red  [] Type/ Screen  [] ABG  [] VBG    [x] COVID-19 swab    [x] Rapid  [] PCR  [x] Flu swab  [] Peds Viral Panel     [] Urine Sample  [] Fecal Sample  [] Pelvic Cultures  [] Blood Cultures  [] X 2  [] STREP Cultures         Perry Jackson RN  11/24/22 4036

## 2022-11-25 NOTE — PROGRESS NOTES
Pharmacy Consultation Note  (Warfarin Dosing and Monitoring)    Initial consult date: 11/25  Consulting Provider: Spike Sultana is a 80 y.o. male for whom pharmacy has been asked to manage warfarin therapy. Weight:   Wt Readings from Last 1 Encounters:   11/25/22 152 lb 8 oz (69.2 kg)       TSH:    Lab Results   Component Value Date/Time    TSH 1.010 07/29/2014 02:00 AM       Hepatic Function Panel:                            Lab Results   Component Value Date/Time    ALKPHOS 61 11/24/2022 08:33 PM    ALT 19 11/24/2022 08:33 PM    AST 19 11/24/2022 08:33 PM    PROT 7.6 11/24/2022 08:33 PM    BILITOT 0.5 11/24/2022 08:33 PM    LABALBU 3.5 11/24/2022 08:33 PM       Current warfarin drug-drug interactions include:  doxycycline (incr INR)    Recent Labs     11/24/22 2033 11/25/22  1010   HGB 11.9* 11.9*    255     Date Warfarin Dose INR Heparin or LMWH Comment   11/25 1.25mg 3.3 --                                  Assessment:  Patient is a 80 y.o. male on warfarin for Atrial Fibrillation and CVA. Patient's home warfarin dosing regimen is alternating 2.5mg and 5mg every other day. Goal INR 2 - 3  INR 3.3 today, elevated in setting of acute CHF exacerbation but decreasing from 3.8 on 11/24 as congestion decreases    Plan:  Warfarin 1.25mg tonight but will need to monitor closely in setting of giving Doxy?   WBC nl, neturophils nl, CXR shows no focal pneumonia, O2 sat 95% on RA  Daily PT/INR until the INR is stable within the therapeutic range  Pharmacist will follow and monitor/adjust dosing as necessary    Thank you for this consult,    Lisa Staff, [address] [place] [address] 11/25/2022 11:08 AM

## 2022-11-25 NOTE — ED PROVIDER NOTES
[facility]  Department of Emergency Medicine     Written by: Deny Negro MD  Patient Name: Phil Liao  Attending Provider: Ra GibbssDO  Admit Date: 2022  8:02 PM  MRN: [id number]                   : 1940        Chief Complaint   Patient presents with    Cough     Cough since being on cruise in October. Today family noticed pt more \"tired\" than normal.  Denies SOB/CP. - Chief complaint    Patient is an 80-year-old male with past medical history of CAD, atrial fibrillation on Coumadin, congestive heart failure, hypertension, hyperlipidemia, and CVA status post endarterectomy 2015 presenting with cough, shortness of breath, and fatigue. Shortness of breath is worse with exertion and improved with rest but overall worsening. 5 days ago, patient noted bilateral lower extremity swelling which has since resolved. Patient symptoms have been ongoing for several weeks. Patient notes nonproductive cough, increasing shortness of breath on exertion, and fatigue. Patient is unable to walk up the steps without having to pause and take a breath. Patient reports that he went on a cruise in [place] for 8 days starting on 10/28/2022. The patient ran out of his prescribed medications for 2 days towards the end of his cruise. However upon returning home he immediately refilled his medication and has been compliant since. Patient is a former smoker, denies current use, denies drug use. Patient denies headache, lightheadedness, dizziness, numbness, weakness, tingling, loss of sensation, loss of consciousness, fever, sore throat, chest pain, nausea, vomiting, abdominal pain, hematuria, dysuria, increasing or decreasing urinary frequency, blood in stool, constipation, diarrhea, leg pain, recent illness, recent surgery, or sick contacts. Review of Systems   Constitutional:  Positive for fatigue. Negative for activity change, chills and fever.    HENT:  Negative for congestion, postnasal drip, rhinorrhea, sinus pressure and sore throat. Eyes:  Negative for photophobia, discharge, redness and visual disturbance. Respiratory:  Positive for cough and shortness of breath. Negative for wheezing. Cardiovascular:  Negative for chest pain, palpitations and leg swelling. Gastrointestinal:  Negative for abdominal distention, abdominal pain, blood in stool, constipation, diarrhea, nausea and vomiting. Endocrine: Negative for cold intolerance and heat intolerance. Genitourinary:  Negative for decreased urine volume, difficulty urinating, dysuria, flank pain, frequency, hematuria and urgency. Musculoskeletal:  Negative for arthralgias, back pain, joint swelling and myalgias. Skin:  Negative for rash and wound. Allergic/Immunologic: Negative for immunocompromised state. Neurological:  Negative for dizziness, syncope, facial asymmetry, weakness, light-headedness, numbness and headaches. Hematological:  Does not bruise/bleed easily. Psychiatric/Behavioral:  Negative for behavioral problems and hallucinations. Physical Exam  Constitutional:       General: He is not in acute distress. Appearance: Normal appearance. He is not ill-appearing, toxic-appearing or diaphoretic. HENT:      Head: Normocephalic and atraumatic. Right Ear: Hearing normal.      Left Ear: Hearing normal.      Nose: Nose normal.      Mouth/Throat:      Lips: Pink. Mouth: Mucous membranes are moist.      Pharynx: Oropharynx is clear. Eyes:      Extraocular Movements: Extraocular movements intact. Conjunctiva/sclera: Conjunctivae normal.      Pupils: Pupils are equal, round, and reactive to light. Cardiovascular:      Rate and Rhythm: Normal rate and regular rhythm. Pulses: Normal pulses. Radial pulses are 2+ on the right side and 2+ on the left side. Posterior tibial pulses are 2+ on the right side and 2+ on the left side.       Heart sounds: S1 normal and S2 normal.   Pulmonary:      Effort: Pulmonary effort is normal. No tachypnea, accessory muscle usage or respiratory distress. Breath sounds: Normal air entry. Examination of the right-upper field reveals rales. Examination of the left-upper field reveals rales. Examination of the right-middle field reveals decreased breath sounds and rales. Examination of the left-middle field reveals decreased breath sounds and rales. Examination of the right-lower field reveals decreased breath sounds and rales. Examination of the left-lower field reveals decreased breath sounds and rales. Decreased breath sounds and rales present. No wheezing or rhonchi. Chest:      Chest wall: No mass, lacerations, deformity, swelling, tenderness, crepitus or edema. Abdominal:      General: Abdomen is flat. Bowel sounds are normal. There is no distension. Palpations: Abdomen is soft. There is no fluid wave or mass. Tenderness: There is no abdominal tenderness. There is no right CVA tenderness, left CVA tenderness or guarding. Musculoskeletal:         General: No swelling or tenderness. Normal range of motion. Cervical back: Normal range of motion and neck supple. No rigidity. Right lower leg: No edema. Left lower leg: No edema. Lymphadenopathy:      Cervical: No cervical adenopathy. Skin:     General: Skin is warm and dry. Capillary Refill: Capillary refill takes less than 2 seconds. Findings: No bruising, lesion or rash. Neurological:      General: No focal deficit present. Mental Status: He is alert and oriented to person, place, and time. Mental status is at baseline. Motor: No weakness. Psychiatric:         Attention and Perception: Attention normal.         Mood and Affect: Mood and affect normal.         Behavior: Behavior is cooperative.         EKG Interpretation    Interpreted by emergency department physician    Rhythm: atrial fibrillation - controlled and PVC  Rate: normal  Axis: normal  Ectopy: PVCs  Conduction: right bundle branch block (complete)  ST Segments: nonspecific changes and minimal depressions in  v4 and v5  T Waves: non specific changes and inversion in  v3, v4, v5, and aVf  Q Waves: nonspecific    Clinical Impression: non-specific EKG with significant changes noted above when compared with prior EKG on 5/26/2019    Procedures       MDM  Number of Diagnoses or Management Options  JACLYN (acute kidney injury) ([facility] [address]  Congestive heart failure, unspecified HF chronicity, unspecified heart failure type [facility])  Diagnosis management comments: Patient is an 80-year-old male with past medical history of CAD, atrial fibrillation on Coumadin, congestive heart failure, hypertension, hyperlipidemia, and CVA status post endarterectomy 2015 presenting with cough, shortness of breath, and fatigue. At the time of initial examination the patient is tachycardic but otherwise hemodynamically stable. EKG interpreted as above. Concern for CHF exacerbation versus pneumonia in this patient. Labs and imaging reviewed as below. Chest x-ray appears to have chronic interstitial opacities. Patient's creatinine is 1.6 which appears to be mildly elevated from his usual.  Initial troponin is 31. Repeat troponin delta is -2. Patient was able to ambulate in the emergency room without becoming hypoxic. Decision was made to admit the patient for likely CHF exacerbation with overlying acute kidney injury. Patient was reevaluated and explained the results of his blood work and his imaging. Patient was started on Lasix in the emergency room. Patient is agreeable for admission. Case was discussed with Dr. Bessie Clements, hospitalist, who agreed to admit the patient. At time of admission, patient demonstrated good understanding of his condition, had no questions, and was hemodynamically stable.        Amount and/or Complexity of Data Reviewed  Decide to obtain previous medical records or to obtain history from someone other than the patient: yes           ED Course as of 11/25/22 0414   Thu Nov 24, 2022 2020 EKG: This EKG is signed and interpreted by the EP. Time: 20:16  Rate: 63  Rhythm: Atrial fibrillation, few PVCs, and with Right BBB  Interpretation: non-specific EKG  Comparison: changes from previous EKG   [CF]   2022 ECHO: 10/16/2016     Structurally normal mitral valve. Moderate mitral regurgitation is present. Normal left ventricle size   Mild concentric left ventricular hypertrophy   LVEF estimated at 50%. Probable posterobasal akinesis      The left atrium is severely dilated. [VG]      ED Course User Index  [CF] Sigifredo Weston DO  [VG] Erlinda Mccray MD       --------------------------------------------- PAST HISTORY ---------------------------------------------  Past Medical History:  has a past medical history of A-fib ([facility] [address] A-fib ([facility] [address] Arthritis, CAD (coronary artery disease), CHF (congestive heart failure) ([facility] [address] Diastolic heart failure ([facility] [address] Hyperlipidemia, Hypertension, and Unspecified cerebral artery occlusion with cerebral infarction. Past Surgical History:  has a past surgical history that includes Coronary angioplasty with stent (7-); Coronary angioplasty with stent (July 2014); Cardioversion (11-); Colonoscopy; hernia repair; Abdomen surgery; eye surgery; Appendectomy; and Carotid endarterectomy (Left, 04/16/2015). Social History:  reports that he quit smoking about 21 years ago. His smoking use included cigarettes. He started smoking about 67 years ago. He smoked an average of .25 packs per day. He has never used smokeless tobacco. He reports current alcohol use. He reports that he does not use drugs. Family History: family history includes Cancer in his sister; Heart Disease in his brother, brother, brother, father, sister, and sister. The patients home medications have been reviewed.     Allergies: Penicillins and Sulfa antibiotics    -------------------------------------------------- RESULTS -------------------------------------------------    LABS:  Results for orders placed or performed during the hospital encounter of 11/24/22   COVID-19, Rapid    Specimen: Nasopharyngeal Swab   Result Value Ref Range    SARS-CoV-2, NAAT Not Detected Not Detected   Rapid RSV Antigen    Specimen: Nasopharyngeal Swab   Result Value Ref Range    RSV by PCR Negative Negative   RAPID INFLUENZA A/B ANTIGENS    Specimen: Nasopharyngeal   Result Value Ref Range    Influenza A by PCR Not Detected Not Detected    Influenza B by PCR Not Detected Not Detected   CBC with Auto Differential   Result Value Ref Range    WBC 8.4 4.5 - 11.5 E9/L    RBC 3.99 3.80 - 5.80 E12/L    Hemoglobin 11.9 (L) 12.5 - 16.5 g/dL    Hematocrit 36.7 (L) 37.0 - 54.0 %    MCV 92.0 80.0 - 99.9 fL    MCH 29.8 26.0 - 35.0 pg    MCHC 32.4 32.0 - 34.5 %    RDW 14.3 11.5 - 15.0 fL    Platelets 346 555 - 473 E9/L    MPV 10.5 7.0 - 12.0 fL    Neutrophils % 68.9 43.0 - 80.0 %    Immature Granulocytes % 0.5 0.0 - 5.0 %    Lymphocytes % 18.5 (L) 20.0 - 42.0 %    Monocytes % 10.3 2.0 - 12.0 %    Eosinophils % 1.2 0.0 - 6.0 %    Basophils % 0.6 0.0 - 2.0 %    Neutrophils Absolute 5.81 1.80 - 7.30 E9/L    Immature Granulocytes # 0.04 E9/L    Lymphocytes Absolute 1.56 1.50 - 4.00 E9/L    Monocytes Absolute 0.87 0.10 - 0.95 E9/L    Eosinophils Absolute 0.10 0.05 - 0.50 E9/L    Basophils Absolute 0.05 0.00 - 0.20 E9/L   CMP   Result Value Ref Range    Sodium 137 132 - 146 mmol/L    Potassium 4.8 3.5 - 5.0 mmol/L    Chloride 100 98 - 107 mmol/L    CO2 26 22 - 29 mmol/L    Anion Gap 11 7 - 16 mmol/L    Glucose 157 (H) 74 - 99 mg/dL    BUN 31 (H) 6 - 23 mg/dL    Creatinine 1.6 (H) 0.7 - 1.2 mg/dL    Est, Glom Filt Rate 43 >=60 mL/min/1.73    Calcium 9.8 8.6 - 10.2 mg/dL    Total Protein 7.6 6.4 - 8.3 g/dL    Albumin 3.5 3.5 - 5.2 g/dL    Total Bilirubin 0.5 0.0 - 1.2 mg/dL    Alkaline Phosphatase 61 40 - 129 U/L    ALT 19 0 - 40 U/L    AST 19 0 - 39 U/L   Brain Natriuretic Peptide   Result Value Ref Range    Pro-BNP 7,773 (H) 0 - 450 pg/mL   Troponin   Result Value Ref Range    Troponin, High Sensitivity 31 (H) 0 - 11 ng/L   Protime-INR   Result Value Ref Range    Protime 41.6 (H) 9.3 - 12.4 sec    INR 3.8    APTT   Result Value Ref Range    aPTT 41.6 (H) 24.5 - 35.1 sec   Troponin   Result Value Ref Range    Troponin, High Sensitivity 29 (H) 0 - 11 ng/L   EKG 12 Lead   Result Value Ref Range    Ventricular Rate 63 BPM    Atrial Rate 288 BPM    QRS Duration 150 ms    Q-T Interval 468 ms    QTc Calculation (Bazett) 478 ms    R Axis 36 degrees    T Axis 4 degrees       RADIOLOGY:  XR CHEST PORTABLE   Final Result   Chronic interstitial opacities bilaterally. No focal pneumonia or evidence   of pleural effusion.               ------------------------- NURSING NOTES AND VITALS REVIEWED ---------------------------  Date / Time Roomed:  11/24/2022  8:02 PM  ED Bed Assignment:  23/23    The nursing notes within the ED encounter and vital signs as below have been reviewed. Patient Vitals for the past 24 hrs:   BP Temp Temp src Pulse Resp SpO2 Height Weight   11/25/22 0030 (!) 156/60 97.5 °F (36.4 °C) Oral 59 16 95 % -- 152 lb 8 oz (69.2 kg)   11/25/22 0008 (!) 180/60 98.9 °F (37.2 °C) Oral 61 16 97 % -- --   11/24/22 2131 122/77 -- -- 64 18 92 % -- --   11/24/22 1957 129/67 98.1 °F (36.7 °C) Oral (!) 122 18 93 % 5' 7\" (1.702 m) 152 lb (68.9 kg)       Oxygen Saturation Interpretation: Abnormal    ------------------------------------------ PROGRESS NOTES ------------------------------------------  Re-evaluation(s):  Time: Multiple  Patients symptoms show no change  Repeat physical examination is not changed    Counseling:  I have spoken with the patient and son  and discussed todays results, in addition to providing specific details for the plan of care and counseling regarding the diagnosis and prognosis.   Their questions are answered at this time and they are agreeable with the plan of admission.    --------------------------------- ADDITIONAL PROVIDER NOTES ---------------------------------  Consultations:  Spoke with Dr. Pascale Sykes. Discussed case. They will admit the patient. This patient's ED course included: a personal history and physicial examination, re-evaluation prior to disposition, multiple bedside re-evaluations, cardiac monitoring, and continuous pulse oximetry    This patient has remained hemodynamically stable during their ED course. Diagnosis:  1. Congestive heart failure, unspecified HF chronicity, unspecified heart failure type ([facility] [address]    2. JACLYN (acute kidney injury) ([facility] [address]        Disposition:  Patient's disposition: Admit to telemetry  Patient's condition is stable. Patient was seen and evaluated by myself and my attending Lynne Cortez DO. Assessment and Plan discussed with attending provider, please see attestation for final plan of care.      MD Miriam Mccracken MD  Resident  11/25/22 [id number]

## 2022-11-25 NOTE — ED NOTES
The following labs were labeled with appropriate pt sticker and tubed to lab:     [] Blue     [] Lavender   [] on ice  [x] Green/yellow  [] Green/black [] on ice  [] Chloe Blood  [] on ice  [] Yellow  [] Red  [] Type/ Screen  [] ABG  [] VBG    [] COVID-19 swab    [] Rapid  [] PCR  [] Flu swab  [] Peds Viral Panel     [] Urine Sample  [] Fecal Sample  [] Pelvic Cultures  [] Blood Cultures  [] X 2  [] STREP Cultures         Dory Andrade RN  11/24/22 1004

## 2022-11-25 NOTE — H&P
Date End Date Taking? Authorizing Provider   furosemide (LASIX) 40 MG tablet Take 1 tablet by mouth daily 5/26/19   Kenny Power, DO   losartan (COZAAR) 50 MG tablet Take 50 mg by mouth daily  7/30/15   Historical Provider, MD   aspirin EC 81 MG EC tablet Take 81 mg by mouth daily. Historical Provider, MD   furosemide (LASIX) 40 MG tablet Take 1 tablet by mouth daily. 8/10/14   Ernie Hernandez DO   potassium chloride SA (K-DUR;KLOR-CON M) 20 MEQ tablet Take 1 tablet by mouth daily (with breakfast). 8/10/14   Ernie Hernandez DO   warfarin (COUMADIN) 2.5 MG tablet Take 1 tablet by mouth daily. Patient taking differently: Take 2.5 mg by mouth daily. Indications: TAKES 5 MG ON SUNDAY, MONDAY, WEDNESDAY, THURSDAY AND SATURDAY. TAKES 2.5 MG ON TUESDAY AND FRIDAY 8/9/14   Ernie Hernandez DO   atorvastatin (LIPITOR) 20 MG tablet Take 1 tablet by mouth nightly. 7/27/14   Ernie Hernandez DO   lisinopril (PRINIVIL;ZESTRIL) 5 MG tablet Take 1 tablet by mouth daily. 7/27/14   Ernie Hernandez DO   metoprolol (TOPROL-XL) 50 MG XL tablet Take 1 tablet by mouth every evening. Patient taking differently: Take 50 mg by mouth daily. 7/27/14   Ernie Hernandez DO   pantoprazole (PROTONIX) 40 MG tablet Take 1 tablet by mouth every morning (before breakfast). 7/27/14   Ernie Hernandez DO   Vitamin D (CHOLECALCIFEROL) 1000 UNITS CAPS capsule Take 1,000 Units by mouth daily. Historical Provider, MD       Allergies:    Penicillins and Sulfa antibiotics    Social History:    reports that he quit smoking about 21 years ago. His smoking use included cigarettes. He started smoking about 67 years ago. He smoked an average of .25 packs per day. He has never used smokeless tobacco. He reports current alcohol use. He reports that he does not use drugs. Family History:   family history includes Cancer in his sister; Heart Disease in his brother, brother, brother, father, sister, and sister.        PHYSICAL EXAM:  Vitals:  /77 Pulse 64   Temp 98.1 °F (36.7 °C) (Oral)   Resp 18   Ht 5' 7\" (1.702 m)   Wt 152 lb (68.9 kg)   SpO2 92%   BMI 23.81 kg/m²     General Appearance: alert and oriented to person, place and time and in no acute distress  Skin: warm and dry  Head: normocephalic and atraumatic  Eyes: pupils equal, round, and reactive to light, conjunctivae normal  Pulmonary/Chest: Rales posterior lung fields with left > right. Cardiovascular: Irregular rhythm, normal rate, normal S1 and S2   Abdomen: soft, non-tender, non-distended, normal bowel sounds, no masses or organomegaly  Extremities: no cyanosis, no clubbing and no edema  Neurologic: speech normal        LABS:  Recent Labs     11/24/22 2033      K 4.8      CO2 26   BUN 31*   CREATININE 1.6*   GLUCOSE 157*   CALCIUM 9.8       Recent Labs     11/24/22 2033   WBC 8.4   RBC 3.99   HGB 11.9*   HCT 36.7*   MCV 92.0   MCH 29.8   MCHC 32.4   RDW 14.3      MPV 10.5       No results for input(s): POCGLU in the last 72 hours. Radiology:   XR CHEST PORTABLE   Final Result   Chronic interstitial opacities bilaterally. No focal pneumonia or evidence   of pleural effusion. EKG:       ASSESSMENT:      Active Problems:    Acute on chronic diastolic CHF (congestive heart failure) (HCC)    Anemia    CAD (coronary artery disease), native coronary artery    Atrial fibrillation ([address]  Resolved Problems:    * No resolved hospital problems. *      PLAN:    1. Acute on chronic diastolic CHF- BNP 3,843. Increase in cough and sputum production. Rales posterior. Lasix 40 mg given via IV in ED. Re-evaluate in AM to see if needs additional IV lasix or restart home dose. LVEF 50%, 2016. Will check echo. 2.  Atrial fibrillation- Continue coumadin. Supra therapeutic at 3.8 INR. Pharmacy consulted for dosing. 3.  CAD- Continue home medications  4. Anemia- H&H 11.9/36.7. Baseline. Chronic. Recheck in AM. Monitor for s/s bleeding.    5.  Elevated creatinine- swelling. He reports increased fatigue and shortness of breath with ambulation more so than usual.  On exam he has bibasilar crackles. Concern for acute on chronic diastolic CHF. Will give 1 dose of IV Lasix now 40 mg once and reassess exam in the morning to see if he will benefit from further IV Lasix versus increasing his home Lasix and proceeding with discharge. Obtain 2D echocardiogram as it has been since 2016 for his last exam.    NOTE: This report was transcribed using voice recognition software. Every effort was made to ensure accuracy; however, inadvertent computerized transcription errors may be present.   Electronically signed by Jose Valdez MD on 11/25/2022 at 12:43 AM

## 2022-11-25 NOTE — PROGRESS NOTES
[facility] Progress Note    Admitting Date and Time: 11/24/2022  8:02 PM  Admit Dx: JACLYN (acute kidney injury) ([facility] [address] [N17.9]  Acute on chronic diastolic CHF (congestive heart failure), NYHA class 2 (HCC) [I50.33]  Congestive heart failure, unspecified HF chronicity, unspecified heart failure type ([address] [I50.9]    Subjective:  Patient is being followed for JACLYN (acute kidney injury) ([facility] [address] [N17.9]  Acute on chronic diastolic CHF (congestive heart failure), NYHA class 2 (HCC) [I50.33]  Congestive heart failure, unspecified HF chronicity, unspecified heart failure type ([facility] [I489]   Is 80year-old past medical history of atrial fibrillation, coronary disease, CHF diastolic heart failure hyperlipidemia pretension admitted here for cough and shortness of breath. He mentioned he been coughing for a while with yellowish-greenish and brownish sputum coming out. He admitted yesterday due to acute on chronic diastolic heart failure and being treated with one-time IV Lasix he seems to be doing well but he still is complaining of cough. ROS: denies fever, chills, cp, sob, n/v, HA unless stated above.       aspirin EC  81 mg Oral Daily    atorvastatin  20 mg Oral Nightly    metoprolol succinate  50 mg Oral Daily    pantoprazole  40 mg Oral QAM AC    sodium chloride flush  5-40 mL IntraVENous 2 times per day    doxycycline hyclate  100 mg Oral 2 times per day    warfarin placeholder: dosing by pharmacy   Other RX Placeholder    warfarin  1.25 mg Oral Once     sodium chloride flush, 5-40 mL, PRN  sodium chloride, , PRN  ondansetron, 4 mg, Q8H PRN   Or  ondansetron, 4 mg, Q6H PRN  polyethylene glycol, 17 g, Daily PRN  acetaminophen, 650 mg, Q6H PRN   Or  acetaminophen, 650 mg, Q6H PRN         Objective:    BP (!) 123/59   Pulse 68   Temp 97.9 °F (36.6 °C) (Oral)   Resp 16   Ht 5' 7\" (1.702 m)   Wt 152 lb 8 oz (69.2 kg)   SpO2 95%   BMI 23.88 kg/m²     General Appearance: alert and oriented to person, place and time and in no acute distress  Skin: warm and dry  Head: normocephalic and atraumatic  Eyes: pupils equal, round, and reactive to light, extraocular eye movements intact, conjunctivae normal  Neck: neck supple and non tender without mass   Pulmonary/Chest: clear to auscultation bilaterally- no wheezes, rales or rhonchi, normal air movement, no respiratory distress  Cardiovascular: Irregularly irregular heart sounds. Abdomen: soft, non-tender, non-distended, normal bowel sounds, no masses or organomegaly  Extremities: no cyanosis, no clubbing and no edema  Neurologic: no cranial nerve deficit and speech normal        Recent Labs     11/24/22 2033 11/25/22  1010    139   K 4.8 4.0    102   CO2 26 24   BUN 31* 28*   CREATININE 1.6* 1.4*   GLUCOSE 157* 116*   CALCIUM 9.8 9.7       Recent Labs     11/24/22 2033 11/25/22  1010   WBC 8.4 7.2   RBC 3.99 4.18   HGB 11.9* 11.9*   HCT 36.7* 37.9   MCV 92.0 90.7   MCH 29.8 28.5   MCHC 32.4 31.4*   RDW 14.3 14.3    255   MPV 10.5 10.4       Radiology:     Assessment:    Active Problems:    Acute on chronic diastolic CHF (congestive heart failure) ([facility])    Anemia    Congestive heart failure (HCC)    JACLYN (acute kidney injury) ([facility]    CAD (coronary artery disease), native coronary artery    [facility]  Resolved Problems:    * No resolved hospital problems. *      Plan:  1. Chronic cough with yellowish and greenish sputum: Chest x-ray did not show any consolidation, most likely due to his chronic bronchitis, doxycycline 100 mg p.o. twice daily started. 2.  Chronic diastolic heart failure: Patient received IV Lasix yesterday, now continue 20 mg p.o. daily. 3.  Persistent atrial fibrillation: Continue metoprolol 50 mg XL p.o. daily, INR is supratherapeutic held on Coumadin now, pharmacologic consulted to reduce his Coumadin. 4.  History of coronary disease: Continue aspirin 81 mg p.o. daily with atorvastatin 20 mg p.o. daily.        NOTE: This report was transcribed using voice recognition software. Every effort was made to ensure accuracy; however, inadvertent computerized transcription errors may be present.   Electronically signed by Breanna Valdovinos MD on 11/25/2022 at 1:07 PM

## 2022-11-26 VITALS
WEIGHT: 153.8 LBS | HEIGHT: 67 IN | BODY MASS INDEX: 24.14 KG/M2 | HEART RATE: 77 BPM | TEMPERATURE: 97.9 F | RESPIRATION RATE: 16 BRPM | OXYGEN SATURATION: 95 % | SYSTOLIC BLOOD PRESSURE: 118 MMHG | DIASTOLIC BLOOD PRESSURE: 69 MMHG

## 2022-11-26 PROBLEM — Z79.01 CHRONIC ANTICOAGULATION: Status: ACTIVE | Noted: 2022-11-26

## 2022-11-26 PROBLEM — I35.0 MODERATE AORTIC STENOSIS: Status: ACTIVE | Noted: 2022-11-26

## 2022-11-26 PROBLEM — R79.1 SUPRATHERAPEUTIC INR: Status: ACTIVE | Noted: 2022-11-26

## 2022-11-26 PROBLEM — J44.1 COPD WITH ACUTE EXACERBATION (HCC): Status: ACTIVE | Noted: 2022-11-26

## 2022-11-26 PROBLEM — Z87.891 HISTORY OF SMOKING 10-25 PACK YEARS: Status: ACTIVE | Noted: 2022-11-26

## 2022-11-26 LAB
ANION GAP SERPL CALCULATED.3IONS-SCNC: 10 MMOL/L (ref 7–16)
BUN BLDV-MCNC: 30 MG/DL (ref 6–23)
CALCIUM SERPL-MCNC: 9.3 MG/DL (ref 8.6–10.2)
CHLORIDE BLD-SCNC: 103 MMOL/L (ref 98–107)
CO2: 23 MMOL/L (ref 22–29)
CREAT SERPL-MCNC: 1.3 MG/DL (ref 0.7–1.2)
GFR SERPL CREATININE-BSD FRML MDRD: 55 ML/MIN/1.73
GLUCOSE BLD-MCNC: 177 MG/DL (ref 74–99)
INR BLD: 3
MAGNESIUM: 2 MG/DL (ref 1.6–2.6)
PHOSPHORUS: 2.7 MG/DL (ref 2.5–4.5)
POTASSIUM SERPL-SCNC: 4 MMOL/L (ref 3.5–5)
PROTHROMBIN TIME: 32.7 SEC (ref 9.3–12.4)
SODIUM BLD-SCNC: 136 MMOL/L (ref 132–146)

## 2022-11-26 PROCEDURE — 84100 ASSAY OF PHOSPHORUS: CPT

## 2022-11-26 PROCEDURE — 80048 BASIC METABOLIC PNL TOTAL CA: CPT

## 2022-11-26 PROCEDURE — 6370000000 HC RX 637 (ALT 250 FOR IP): Performed by: STUDENT IN AN ORGANIZED HEALTH CARE EDUCATION/TRAINING PROGRAM

## 2022-11-26 PROCEDURE — 36415 COLL VENOUS BLD VENIPUNCTURE: CPT

## 2022-11-26 PROCEDURE — 85610 PROTHROMBIN TIME: CPT

## 2022-11-26 PROCEDURE — 99238 HOSP IP/OBS DSCHRG MGMT 30/<: CPT | Performed by: FAMILY MEDICINE

## 2022-11-26 PROCEDURE — 6370000000 HC RX 637 (ALT 250 FOR IP): Performed by: INTERNAL MEDICINE

## 2022-11-26 PROCEDURE — 83735 ASSAY OF MAGNESIUM: CPT

## 2022-11-26 PROCEDURE — 2580000003 HC RX 258: Performed by: STUDENT IN AN ORGANIZED HEALTH CARE EDUCATION/TRAINING PROGRAM

## 2022-11-26 RX ORDER — LOSARTAN POTASSIUM 25 MG/1
25 TABLET ORAL DAILY
Qty: 30 TABLET | Refills: 0 | Status: SHIPPED | OUTPATIENT
Start: 2022-11-26

## 2022-11-26 RX ORDER — METOPROLOL SUCCINATE 50 MG/1
50 TABLET, EXTENDED RELEASE ORAL DAILY
Qty: 30 TABLET | Refills: 3 | Status: SHIPPED | OUTPATIENT
Start: 2022-11-27

## 2022-11-26 RX ORDER — WARFARIN SODIUM 2.5 MG/1
2.5 TABLET ORAL
Status: DISCONTINUED | OUTPATIENT
Start: 2022-11-26 | End: 2022-11-26 | Stop reason: HOSPADM

## 2022-11-26 RX ORDER — WARFARIN SODIUM 1 MG/1
1.5 TABLET ORAL DAILY
Qty: 30 TABLET | Refills: 0 | Status: SHIPPED | OUTPATIENT
Start: 2022-11-27

## 2022-11-26 RX ORDER — FUROSEMIDE 20 MG/1
20 TABLET ORAL DAILY
Qty: 30 TABLET | Refills: 0 | Status: SHIPPED | OUTPATIENT
Start: 2022-11-26

## 2022-11-26 RX ORDER — DOXYCYCLINE HYCLATE 100 MG/1
100 CAPSULE ORAL EVERY 12 HOURS SCHEDULED
Qty: 6 CAPSULE | Refills: 0 | Status: SHIPPED | OUTPATIENT
Start: 2022-11-26 | End: 2022-11-29

## 2022-11-26 RX ADMIN — ASPIRIN 81 MG: 81 TABLET, COATED ORAL at 09:14

## 2022-11-26 RX ADMIN — METOPROLOL SUCCINATE 50 MG: 50 TABLET, EXTENDED RELEASE ORAL at 09:14

## 2022-11-26 RX ADMIN — DOXYCYCLINE HYCLATE 100 MG: 100 CAPSULE ORAL at 09:14

## 2022-11-26 RX ADMIN — Medication 10 ML: at 09:14

## 2022-11-26 RX ADMIN — PANTOPRAZOLE SODIUM 40 MG: 40 TABLET, DELAYED RELEASE ORAL at 06:46

## 2022-11-26 NOTE — PROGRESS NOTES
Pharmacy Consultation Note  (Warfarin Dosing and Monitoring)    Initial consult date: 11/25  Consulting Provider: Mike Little is a 80 y.o. male for whom pharmacy has been asked to manage warfarin therapy. Weight:   Wt Readings from Last 1 Encounters:   11/26/22 153 lb 12.8 oz (69.8 kg)       TSH:    Lab Results   Component Value Date/Time    TSH 1.010 07/29/2014 02:00 AM       Hepatic Function Panel:                            Lab Results   Component Value Date/Time    ALKPHOS 61 11/24/2022 08:33 PM    ALT 19 11/24/2022 08:33 PM    AST 19 11/24/2022 08:33 PM    PROT 7.6 11/24/2022 08:33 PM    BILITOT 0.5 11/24/2022 08:33 PM    LABALBU 3.5 11/24/2022 08:33 PM       Current warfarin drug-drug interactions include:  doxycycline (incr INR)    Recent Labs     11/24/22 2033 11/25/22  1010   HGB 11.9* 11.9*    255       Date Warfarin Dose INR Heparin or LMWH Comment   11/25 1.25mg 3.3 --    11/26 2.5mg 3.0 --                           Assessment:  Patient is a 80 y.o. male on warfarin for Atrial Fibrillation and CVA. Patient's home warfarin dosing regimen is alternating 2.5mg and 5mg every other day. Goal INR 2 - 3  INR 3 today, elevated in setting of acute CHF exacerbation but decreasing from 3.8 on 11/24 as congestion decreases    Plan:  Warfarin 2.5mg tonight but will need to monitor closely in setting of giving Doxy?   WBC nl, neturophils nl, CXR shows no focal pneumonia, O2 sat 95% on RA  Daily PT/INR until the INR is stable within the therapeutic range  Pharmacist will follow and monitor/adjust dosing as necessary    Thank you for this consult,    Esmer Orozco, [facility] 11/26/2022 10:41 AM

## 2022-11-26 NOTE — PLAN OF CARE
Problem: Safety - Adult  Goal: Free from fall injury  Outcome: Progressing  Flowsheets (Taken 11/25/2022 2045)  Free From Fall Injury: Instruct family/caregiver on patient safety

## 2022-11-26 NOTE — PLAN OF CARE
Problem: Discharge Planning  Goal: Discharge to home or other facility with appropriate resources  11/26/2022 0503 by Gely Raman RN  Outcome: Progressing  Flowsheets (Taken 11/25/2022 2045)  Discharge to home or other facility with appropriate resources: Identify barriers to discharge with patient and caregiver     Problem: Safety - Adult  Goal: Free from fall injury  11/26/2022 1043 by Naomi Hernandez RN  Outcome: Progressing  11/26/2022 0503 by Gely Raman RN  Outcome: Vessie Anali (Taken 11/25/2022 2045)  Free From Fall Injury: Instruct family/caregiver on patient safety     Problem: ABCDS Injury Assessment  Goal: Absence of physical injury  11/26/2022 1043 by Naomi Hernandez RN  Outcome: Progressing  11/26/2022 0503 by Gely Raman RN  Outcome: Progressing  Flowsheets (Taken 11/25/2022 2045)  Absence of Physical Injury: Implement safety measures based on patient assessment     Problem: Pain  Goal: Verbalizes/displays adequate comfort level or baseline comfort level  11/26/2022 0503 by Gely Raman RN  Outcome: Progressing

## 2022-11-26 NOTE — DISCHARGE INSTR - DIET
Good nutrition is important when healing from an illness, injury, or surgery. Follow any nutrition recommendations given to you during your hospital stay. If you were given an oral nutrition supplement while in the hospital, continue to take this supplement at home. You can take it with meals, in-between meals, and/or before bedtime. These supplements can be purchased at most local grocery stores, pharmacies, and chain ICU [employer]-stores. If you have any questions about your diet or nutrition, call the hospital and ask for the dietitian.       Low salt diet

## 2022-11-26 NOTE — PROGRESS NOTES
Patient on room air and independent at home. On room air while resting, oxygen was 95%, while ambulating oxygen ranged from 94-97%. Ambulated 100ft, towards the end pulse ox read 90%. Recovering pulse ox was 95%. Patient tolerated well.

## 2022-11-28 NOTE — DISCHARGE SUMMARY
[facility] Physician Discharge Summary       Todd Bustos [address]  [phone]    Schedule an appointment as soon as possible for a visit in 3 day(s)      MD Shashi Layne [address]  [facility] [address]  [phone]    Schedule an appointment as soon as possible for a visit in 3 day(s)        Activity level:  as tolerated    Diet: low salt, low fat, on warfarin    Labs: INR X 3    Condition at discharge: good/stable    Dispo:to home with Fernie Bhatia    Patient ID:  Héctor Stephenson  [id number]  80 y.o.  1940    Admit date: 11/24/2022    Discharge date and time:  11/26/2022 2:50 PM    Admission Diagnoses: Principal Problem:    COPD with acute exacerbation ([address]  Active Problems:    Anemia    Congestive heart failure (HCC)    Chronic anticoagulation    Supratherapeutic INR    Moderate aortic stenosis    History of smoking 10-25 pack years    CAD (coronary artery disease), native coronary artery    Atrial fibrillation ([address]    History of CVA (cerebrovascular accident)  Resolved Problems:    * No resolved hospital problems. *      Discharge Diagnoses: Principal Problem:    COPD with acute exacerbation ([address]  Active Problems:    Anemia    Congestive heart failure (HCC)    Chronic anticoagulation    Supratherapeutic INR    Moderate aortic stenosis    History of smoking 10-25 pack years    CAD (coronary artery disease), native coronary artery    Atrial fibrillation (HCC)    History of CVA (cerebrovascular accident)  Resolved Problems:    * No resolved hospital problems. *      Consults:  IP CONSULT TO PHARMACY    Procedures:       Hospital Course: This is a 80year old male with PMH significant for atrial fibrillation, CAD, CHF, diastolic heart failure (LVEF 50%, 2016), HLD, CVA, and HTN. Patient to ED for complaints of Cough. Patient reports having cough for the last 3 weeks.  Noticed increased fatigue 2 weeks ago while on 8 day [place] cruise. Cough worsened with activity. Improved by nothing. Associated symptoms include increase in sputum production. Does take Furosamide 40 mg daily. Denies recent illness, sore throat, headache, CP, abdominal pain and changes in urination. Does report chronic shortness of breath and slight constipation. Pt did not require oxygen while here. Ambulatory pulse ox on day of discharge -- ranged from 90 to 97%. No need for home O2. Pt diagnosed with bronchitis. His afib was controlled. His heart failure was compensated. Will discharge on doxycycline for the bronchitis that he likely acquired while on a cruise to [facility]. On day of discharge -- denies complaints. No CP. No SOB. Persisting cough. No fevers. No events since admission. Eating/drinking well. Euvolemic on exam.    Supratherapeutic INR -- warfarin held. INR's ordered next week, starting 11/28. Can take lower dosage of warfarin starting 11/28. Discharge Exam:    Alert, in NAD, not in respiratory distress, breathing comfortably on RA at rest in bed, mental status normal, well appearing, appears stated age, non-toxic, conversational, speech appropriate and fluent  Eyes and mouth clear, moist, tongue and uvula midline, neck supple, no JVD  S1/S2 with RRR, no M/R/G  Lungs CTA bilaterally, no W/R/R  Abd soft/NT/ND/normoactive BS's  No LE edema, pos pedal pulses, no cyanosis, good cap refill of digits  Skin warm, dry, good turgor, no rashes, no jaundice     I/O last 3 completed shifts: In: 5 [P.O.:420]  Out: -   No intake/output data recorded.       LABS:  Recent Labs     11/26/22  1317      K 4.0      CO2 23   BUN 30*   CREATININE 1.3*   GLUCOSE 177*   CALCIUM 9.3         CBC with Differential:    Lab Results   Component Value Date/Time    WBC 7.2 11/25/2022 10:10 AM    RBC 4.18 11/25/2022 10:10 AM    HGB 11.9 11/25/2022 10:10 AM    HCT 37.9 11/25/2022 10:10 AM     11/25/2022 10:10 AM    MCV 90.7 11/25/2022 10:10 AM    MCH 28.5 11/25/2022 10:10 AM    MCHC 31.4 11/25/2022 10:10 AM    RDW 14.3 11/25/2022 10:10 AM    LYMPHOPCT 21.1 11/25/2022 10:10 AM    MONOPCT 13.2 11/25/2022 10:10 AM    BASOPCT 0.8 11/25/2022 10:10 AM    MONOSABS 0.95 11/25/2022 10:10 AM    LYMPHSABS 1.52 11/25/2022 10:10 AM    EOSABS 0.13 11/25/2022 10:10 AM    BASOSABS 0.06 11/25/2022 10:10 AM     BMP:    Lab Results   Component Value Date/Time     11/26/2022 01:17 PM    K 4.0 11/26/2022 01:17 PM    K 4.0 11/25/2022 10:10 AM     11/26/2022 01:17 PM    CO2 23 11/26/2022 01:17 PM    BUN 30 11/26/2022 01:17 PM    LABALBU 3.5 11/24/2022 08:33 PM    CREATININE 1.3 11/26/2022 01:17 PM    CALCIUM 9.3 11/26/2022 01:17 PM    GFRAA 59 05/26/2019 09:24 AM    LABGLOM 55 11/26/2022 01:17 PM    GLUCOSE 177 11/26/2022 01:17 PM     Hepatic Function Panel:    Lab Results   Component Value Date/Time    ALKPHOS 61 11/24/2022 08:33 PM    ALT 19 11/24/2022 08:33 PM    AST 19 11/24/2022 08:33 PM    PROT 7.6 11/24/2022 08:33 PM    BILITOT 0.5 11/24/2022 08:33 PM    LABALBU 3.5 11/24/2022 08:33 PM       Imaging:  XR CHEST PORTABLE  Result Date: 11/24/2022  EXAMINATION: ONE XRAY VIEW OF THE CHEST 11/24/2022 8:53 pm COMPARISON: May 26, 2019 HISTORY: ORDERING SYSTEM PROVIDED HISTORY: shortness of breath TECHNOLOGIST PROVIDED HISTORY: Reason for exam:->shortness of breath FINDINGS: Mild cardiomegaly. No obvious airspace opacity or pleural effusion. Chronic interstitial opacities bilaterally. No pneumothorax. Chronic interstitial opacities bilaterally. No focal pneumonia or evidence of pleural effusion.          Patient Instructions:   Discharge Medication List as of 11/26/2022  5:07 PM        START taking these medications    Details   doxycycline hyclate (VIBRAMYCIN) 100 MG capsule Take 1 capsule by mouth every 12 hours for 6 doses, Disp-6 capsule, R-0Normal           CONTINUE these medications which have CHANGED    Details   warfarin (COUMADIN) 1 MG tablet Take 1.5 tablets by mouth daily, Disp-30 tablet, R-0Normal      metoprolol succinate (TOPROL XL) 50 MG extended release tablet Take 1 tablet by mouth daily, Disp-30 tablet, R-3Normal      furosemide (LASIX) 20 MG tablet Take 1 tablet by mouth daily, Disp-30 tablet, R-0Normal      losartan (COZAAR) 25 MG tablet Take 1 tablet by mouth daily, Disp-30 tablet, R-0Normal           CONTINUE these medications which have NOT CHANGED    Details   aspirin EC 81 MG EC tablet Take 81 mg by mouth daily. atorvastatin (LIPITOR) 20 MG tablet Take 1 tablet by mouth nightly., Disp-30 tablet, R-3      pantoprazole (PROTONIX) 40 MG tablet Take 1 tablet by mouth every morning (before breakfast). , Disp-30 tablet, R-3      Vitamin D (CHOLECALCIFEROL) 1000 UNITS CAPS capsule Take 1,000 Units by mouth daily. STOP taking these medications       potassium chloride SA (K-DUR;KLOR-CON M) 20 MEQ tablet Comments:   Reason for Stopping:         lisinopril (PRINIVIL;ZESTRIL) 5 MG tablet Comments:   Reason for Stopping:                 Note that less than 30 minutes was spent in preparing discharge papers, discussing discharge with patient, medication review, etc.    NOTE: This report was transcribed using voice recognition software. Every effort was made to ensure accuracy; however, inadvertent computerized transcription errors may be present.      Signed:  Electronically signed by Luis Cooley DO on 11/28/2022 at 1:57 PM

## 2025-01-01 ENCOUNTER — TELEPHONE (OUTPATIENT)
Dept: ADMINISTRATIVE | Age: 85
End: 2025-01-01

## 2025-04-17 ENCOUNTER — OFFICE VISIT (OUTPATIENT)
Dept: CARDIOLOGY CLINIC | Age: 85
End: 2025-04-17
Payer: MEDICARE

## 2025-04-17 ENCOUNTER — TELEPHONE (OUTPATIENT)
Dept: CARDIOLOGY | Age: 85
End: 2025-04-17

## 2025-04-17 VITALS
HEIGHT: 65 IN | WEIGHT: 142.8 LBS | DIASTOLIC BLOOD PRESSURE: 78 MMHG | RESPIRATION RATE: 18 BRPM | SYSTOLIC BLOOD PRESSURE: 160 MMHG | HEART RATE: 39 BPM | BODY MASS INDEX: 23.79 KG/M2

## 2025-04-17 DIAGNOSIS — I25.10 CORONARY ARTERY DISEASE INVOLVING NATIVE CORONARY ARTERY OF NATIVE HEART WITHOUT ANGINA PECTORIS: Chronic | ICD-10-CM

## 2025-04-17 DIAGNOSIS — I50.22 HEART FAILURE WITH MID-RANGE EJECTION FRACTION (HFMEF) (HCC): ICD-10-CM

## 2025-04-17 DIAGNOSIS — R01.1 HEART MURMUR: ICD-10-CM

## 2025-04-17 DIAGNOSIS — I48.91 ATRIAL FIBRILLATION, UNSPECIFIED TYPE (HCC): Primary | ICD-10-CM

## 2025-04-17 DIAGNOSIS — I35.0 MODERATE AORTIC STENOSIS: ICD-10-CM

## 2025-04-17 PROBLEM — J44.1 COPD WITH ACUTE EXACERBATION (HCC): Status: RESOLVED | Noted: 2022-11-26 | Resolved: 2025-04-17

## 2025-04-17 PROBLEM — I50.33 ACUTE ON CHRONIC DIASTOLIC CHF (CONGESTIVE HEART FAILURE) (HCC): Status: RESOLVED | Noted: 2022-11-24 | Resolved: 2025-04-17

## 2025-04-17 PROBLEM — R79.1 SUPRATHERAPEUTIC INR: Status: RESOLVED | Noted: 2022-11-26 | Resolved: 2025-04-17

## 2025-04-17 PROBLEM — Z79.01 CHRONIC ANTICOAGULATION: Status: RESOLVED | Noted: 2022-11-26 | Resolved: 2025-04-17

## 2025-04-17 PROBLEM — I50.9 CONGESTIVE HEART FAILURE (HCC): Status: RESOLVED | Noted: 2022-11-25 | Resolved: 2025-04-17

## 2025-04-17 PROBLEM — N17.9 AKI (ACUTE KIDNEY INJURY): Status: RESOLVED | Noted: 2022-11-25 | Resolved: 2025-04-17

## 2025-04-17 PROBLEM — Z87.891 HISTORY OF SMOKING 10-25 PACK YEARS: Status: RESOLVED | Noted: 2022-11-26 | Resolved: 2025-04-17

## 2025-04-17 PROCEDURE — 99205 OFFICE O/P NEW HI 60 MIN: CPT | Performed by: INTERNAL MEDICINE

## 2025-04-17 PROCEDURE — 1159F MED LIST DOCD IN RCRD: CPT | Performed by: INTERNAL MEDICINE

## 2025-04-17 PROCEDURE — 93000 ELECTROCARDIOGRAM COMPLETE: CPT | Performed by: INTERNAL MEDICINE

## 2025-04-17 PROCEDURE — 1160F RVW MEDS BY RX/DR IN RCRD: CPT | Performed by: INTERNAL MEDICINE

## 2025-04-17 PROCEDURE — 1123F ACP DISCUSS/DSCN MKR DOCD: CPT | Performed by: INTERNAL MEDICINE

## 2025-04-17 PROCEDURE — G2211 COMPLEX E/M VISIT ADD ON: HCPCS | Performed by: INTERNAL MEDICINE

## 2025-04-17 NOTE — PROGRESS NOTES
breakfast). 30 tablet 3    Vitamin D (CHOLECALCIFEROL) 1000 UNITS CAPS capsule Take 1 capsule by mouth daily       No current facility-administered medications for this visit.        Allergies   Allergen Reactions    Penicillins     Sulfa Antibiotics        Vitals:    25 1102   BP: (!) 160/78   Pulse: (!) 39   Resp: 18   Weight: 64.8 kg (142 lb 12.8 oz)   Height: 1.651 m (5' 5\")       Social History     Socioeconomic History    Marital status:      Spouse name: Not on file    Number of children: Not on file    Years of education: Not on file    Highest education level: Not on file   Occupational History    Not on file   Tobacco Use    Smoking status: Former     Current packs/day: 0.00     Average packs/day: 0.3 packs/day for 45.5 years (11.4 ttl pk-yrs)     Types: Cigarettes     Start date: 1955     Quit date: 2001     Years since quittin.2    Smokeless tobacco: Never   Substance and Sexual Activity    Alcohol use: Yes     Comment: glass of wine occasionally    Drug use: No    Sexual activity: Never   Other Topics Concern    Not on file   Social History Narrative    ** Merged History Encounter **          Social Drivers of Health     Financial Resource Strain: Not on file   Food Insecurity: Not on file   Transportation Needs: Not on file   Physical Activity: Not on file   Stress: Not on file   Social Connections: Not on file   Intimate Partner Violence: Not on file   Housing Stability: Not on file       Family History   Problem Relation Age of Onset    Heart Disease Father     Cancer Sister     Heart Disease Sister     Heart Disease Brother     Heart Disease Sister     Heart Disease Brother     Heart Disease Brother        SUBJECTIVE: Trace Nassar presents to the office today for consult - dr davis  I have not seen him in 9 years.   Referred to give him permission or not to stop OAC because he is cold       He complains of  no cardiac symptoms or neuro symptoms  with normal

## 2025-04-17 NOTE — TELEPHONE ENCOUNTER
CALLED PATIENT AND LEFT MESSAGE TO SCHEDULE ECHO. CALLED PT'S SON AND LEFT MESSAGE  TO SCHEDULE ECHO

## 2025-04-22 ENCOUNTER — APPOINTMENT (OUTPATIENT)
Dept: GENERAL RADIOLOGY | Age: 85
DRG: 312 | End: 2025-04-22
Payer: MEDICARE

## 2025-04-22 ENCOUNTER — HOSPITAL ENCOUNTER (INPATIENT)
Age: 85
LOS: 1 days | Discharge: ANOTHER HOSPITAL | DRG: 312 | End: 2025-04-25
Attending: EMERGENCY MEDICINE | Admitting: INTERNAL MEDICINE
Payer: MEDICARE

## 2025-04-22 ENCOUNTER — APPOINTMENT (OUTPATIENT)
Dept: CT IMAGING | Age: 85
DRG: 312 | End: 2025-04-22
Payer: MEDICARE

## 2025-04-22 DIAGNOSIS — R42 LIGHTHEADEDNESS: Primary | ICD-10-CM

## 2025-04-22 DIAGNOSIS — R55 NEAR SYNCOPE: ICD-10-CM

## 2025-04-22 DIAGNOSIS — I48.91 ATRIAL FIBRILLATION, UNSPECIFIED TYPE (HCC): ICD-10-CM

## 2025-04-22 DIAGNOSIS — R55 SYNCOPE, UNSPECIFIED SYNCOPE TYPE: ICD-10-CM

## 2025-04-22 DIAGNOSIS — I35.0 NONRHEUMATIC AORTIC VALVE STENOSIS: ICD-10-CM

## 2025-04-22 DIAGNOSIS — R55 SYNCOPE AND COLLAPSE: ICD-10-CM

## 2025-04-22 LAB
ANION GAP SERPL CALCULATED.3IONS-SCNC: 10 MMOL/L (ref 7–16)
BASOPHILS # BLD: 0.05 K/UL (ref 0–0.2)
BASOPHILS NFR BLD: 1 % (ref 0–2)
BNP SERPL-MCNC: ABNORMAL PG/ML (ref 0–450)
BUN SERPL-MCNC: 26 MG/DL (ref 6–23)
CALCIUM SERPL-MCNC: 9.1 MG/DL (ref 8.6–10.2)
CHLORIDE SERPL-SCNC: 105 MMOL/L (ref 98–107)
CO2 SERPL-SCNC: 23 MMOL/L (ref 22–29)
CREAT SERPL-MCNC: 1.6 MG/DL (ref 0.7–1.2)
EOSINOPHIL # BLD: 0.11 K/UL (ref 0.05–0.5)
EOSINOPHILS RELATIVE PERCENT: 2 % (ref 0–6)
ERYTHROCYTE [DISTWIDTH] IN BLOOD BY AUTOMATED COUNT: 16.1 % (ref 11.5–15)
GFR, ESTIMATED: 41 ML/MIN/1.73M2
GLUCOSE SERPL-MCNC: 132 MG/DL (ref 74–99)
HCT VFR BLD AUTO: 37.6 % (ref 37–54)
HGB BLD-MCNC: 12 G/DL (ref 12.5–16.5)
IMM GRANULOCYTES # BLD AUTO: <0.03 K/UL (ref 0–0.58)
IMM GRANULOCYTES NFR BLD: 0 % (ref 0–5)
INR PPP: 3.5
LYMPHOCYTES NFR BLD: 0.98 K/UL (ref 1.5–4)
LYMPHOCYTES RELATIVE PERCENT: 19 % (ref 20–42)
MCH RBC QN AUTO: 29.7 PG (ref 26–35)
MCHC RBC AUTO-ENTMCNC: 31.9 G/DL (ref 32–34.5)
MCV RBC AUTO: 93.1 FL (ref 80–99.9)
MONOCYTES NFR BLD: 0.52 K/UL (ref 0.1–0.95)
MONOCYTES NFR BLD: 10 % (ref 2–12)
NEUTROPHILS NFR BLD: 67 % (ref 43–80)
NEUTS SEG NFR BLD: 3.47 K/UL (ref 1.8–7.3)
PLATELET # BLD AUTO: 169 K/UL (ref 130–450)
PMV BLD AUTO: 11.8 FL (ref 7–12)
POTASSIUM SERPL-SCNC: 4.8 MMOL/L (ref 3.5–5)
PROTHROMBIN TIME: 36.9 SEC (ref 9.3–12.4)
RBC # BLD AUTO: 4.04 M/UL (ref 3.8–5.8)
SODIUM SERPL-SCNC: 138 MMOL/L (ref 132–146)
TROPONIN I SERPL HS-MCNC: 36 NG/L (ref 0–22)
TROPONIN I SERPL HS-MCNC: 40 NG/L (ref 0–22)
WBC OTHER # BLD: 5.2 K/UL (ref 4.5–11.5)

## 2025-04-22 PROCEDURE — 85025 COMPLETE CBC W/AUTO DIFF WBC: CPT

## 2025-04-22 PROCEDURE — 70450 CT HEAD/BRAIN W/O DYE: CPT

## 2025-04-22 PROCEDURE — 83880 ASSAY OF NATRIURETIC PEPTIDE: CPT

## 2025-04-22 PROCEDURE — 80048 BASIC METABOLIC PNL TOTAL CA: CPT

## 2025-04-22 PROCEDURE — 6370000000 HC RX 637 (ALT 250 FOR IP)

## 2025-04-22 PROCEDURE — 71045 X-RAY EXAM CHEST 1 VIEW: CPT

## 2025-04-22 PROCEDURE — 93005 ELECTROCARDIOGRAM TRACING: CPT

## 2025-04-22 PROCEDURE — 2500000003 HC RX 250 WO HCPCS: Performed by: PHYSICIAN ASSISTANT

## 2025-04-22 PROCEDURE — 84484 ASSAY OF TROPONIN QUANT: CPT

## 2025-04-22 PROCEDURE — 99285 EMERGENCY DEPT VISIT HI MDM: CPT

## 2025-04-22 PROCEDURE — 85610 PROTHROMBIN TIME: CPT

## 2025-04-22 PROCEDURE — G0378 HOSPITAL OBSERVATION PER HR: HCPCS

## 2025-04-22 PROCEDURE — 6370000000 HC RX 637 (ALT 250 FOR IP): Performed by: PHYSICIAN ASSISTANT

## 2025-04-22 PROCEDURE — 72125 CT NECK SPINE W/O DYE: CPT

## 2025-04-22 PROCEDURE — 71250 CT THORAX DX C-: CPT

## 2025-04-22 RX ORDER — ACETAMINOPHEN 650 MG/1
650 SUPPOSITORY RECTAL EVERY 6 HOURS PRN
Status: DISCONTINUED | OUTPATIENT
Start: 2025-04-22 | End: 2025-04-25 | Stop reason: HOSPADM

## 2025-04-22 RX ORDER — SODIUM CHLORIDE 0.9 % (FLUSH) 0.9 %
10 SYRINGE (ML) INJECTION EVERY 12 HOURS SCHEDULED
Status: DISCONTINUED | OUTPATIENT
Start: 2025-04-22 | End: 2025-04-25 | Stop reason: HOSPADM

## 2025-04-22 RX ORDER — ONDANSETRON 4 MG/1
4 TABLET, ORALLY DISINTEGRATING ORAL EVERY 8 HOURS PRN
Status: DISCONTINUED | OUTPATIENT
Start: 2025-04-22 | End: 2025-04-25 | Stop reason: HOSPADM

## 2025-04-22 RX ORDER — ATORVASTATIN CALCIUM 20 MG/1
20 TABLET, FILM COATED ORAL NIGHTLY
Status: DISCONTINUED | OUTPATIENT
Start: 2025-04-22 | End: 2025-04-25 | Stop reason: HOSPADM

## 2025-04-22 RX ORDER — GINSENG 100 MG
CAPSULE ORAL ONCE
Status: COMPLETED | OUTPATIENT
Start: 2025-04-22 | End: 2025-04-22

## 2025-04-22 RX ORDER — PANTOPRAZOLE SODIUM 40 MG/1
40 TABLET, DELAYED RELEASE ORAL
Status: DISCONTINUED | OUTPATIENT
Start: 2025-04-23 | End: 2025-04-25 | Stop reason: HOSPADM

## 2025-04-22 RX ORDER — POTASSIUM CHLORIDE 1500 MG/1
20 TABLET, EXTENDED RELEASE ORAL DAILY
Status: ON HOLD | COMMUNITY
Start: 2025-04-07

## 2025-04-22 RX ORDER — LOSARTAN POTASSIUM 25 MG/1
25 TABLET ORAL DAILY
Status: DISCONTINUED | OUTPATIENT
Start: 2025-04-23 | End: 2025-04-25 | Stop reason: HOSPADM

## 2025-04-22 RX ORDER — ONDANSETRON 2 MG/ML
4 INJECTION INTRAMUSCULAR; INTRAVENOUS EVERY 6 HOURS PRN
Status: DISCONTINUED | OUTPATIENT
Start: 2025-04-22 | End: 2025-04-25 | Stop reason: HOSPADM

## 2025-04-22 RX ORDER — SODIUM CHLORIDE 0.9 % (FLUSH) 0.9 %
10 SYRINGE (ML) INJECTION PRN
Status: DISCONTINUED | OUTPATIENT
Start: 2025-04-22 | End: 2025-04-25 | Stop reason: HOSPADM

## 2025-04-22 RX ORDER — SENNOSIDES A AND B 8.6 MG/1
1 TABLET, FILM COATED ORAL DAILY PRN
Status: DISCONTINUED | OUTPATIENT
Start: 2025-04-22 | End: 2025-04-25 | Stop reason: HOSPADM

## 2025-04-22 RX ORDER — SODIUM CHLORIDE 9 MG/ML
INJECTION, SOLUTION INTRAVENOUS PRN
Status: DISCONTINUED | OUTPATIENT
Start: 2025-04-22 | End: 2025-04-25 | Stop reason: HOSPADM

## 2025-04-22 RX ORDER — FUROSEMIDE 20 MG/1
20 TABLET ORAL DAILY
Status: DISCONTINUED | OUTPATIENT
Start: 2025-04-23 | End: 2025-04-25 | Stop reason: HOSPADM

## 2025-04-22 RX ORDER — ENOXAPARIN SODIUM 100 MG/ML
30 INJECTION SUBCUTANEOUS DAILY
Status: DISCONTINUED | OUTPATIENT
Start: 2025-04-22 | End: 2025-04-22

## 2025-04-22 RX ORDER — ASPIRIN 81 MG/1
81 TABLET ORAL DAILY
Status: DISCONTINUED | OUTPATIENT
Start: 2025-04-23 | End: 2025-04-23

## 2025-04-22 RX ORDER — WARFARIN SODIUM 3 MG/1
1.5 TABLET ORAL DAILY
Status: DISCONTINUED | OUTPATIENT
Start: 2025-04-23 | End: 2025-04-23

## 2025-04-22 RX ORDER — ACETAMINOPHEN 325 MG/1
650 TABLET ORAL EVERY 6 HOURS PRN
Status: DISCONTINUED | OUTPATIENT
Start: 2025-04-22 | End: 2025-04-25 | Stop reason: HOSPADM

## 2025-04-22 RX ADMIN — SODIUM CHLORIDE, PRESERVATIVE FREE 10 ML: 5 INJECTION INTRAVENOUS at 20:48

## 2025-04-22 RX ADMIN — BACITRACIN ZINC: 500 OINTMENT TOPICAL at 14:34

## 2025-04-22 RX ADMIN — Medication 6 MG: at 21:32

## 2025-04-22 RX ADMIN — ATORVASTATIN CALCIUM 20 MG: 20 TABLET, FILM COATED ORAL at 23:31

## 2025-04-22 NOTE — PROGRESS NOTES
Database initiated pharmacy and medications verified with the patient. He is A&O comes in from home alone. He uses no assistive devices and is RA at baseline. A little hard of hearing. He \"passed out\" and fell today at [facility].

## 2025-04-22 NOTE — ED PROVIDER NOTES
[facility] EMERGENCY DEPARTMENT  EMERGENCY DEPARTMENT ENCOUNTER        Pt Name: Trace Nassar  MRN: [id number]  Birthdate 1940  Date of evaluation: 4/22/2025  Provider: Cuba Raygoza MD  PCP: Silvino Garcia DO  Note Started: 1:20 PM EDT 4/22/25    CHIEF COMPLAINT & HISTORY     Chief Complaint   Patient presents with    Dizziness     Pt was k and dizzy a [employer] and brought in for evaluation         History From: pt  Trace Nassar is a 85 y.o. male w/pmhx of CAD, A-fib, heart failure presenting with syncope.  According to EMS he was at [employer] when he became very weak and he is not sure if he lost consciousness or not. He reached to grab something and fell.  Upon arrival EMS said that he was in a flutter on his EKG.  EKG shows atrial flutter in particular in lead V3 and V2 and V1 most prominently but visible throughout.  No acute ST elevations or depressions are visible.  He says he presents feeling well lately and he is still feeling fine according to him.  He denies any chest pain, he denies shortness of breath.  He denies any tingling or numbness.  He says that he had a heart attack 12 years ago and he has 2 stents put in by Dr. Layne.      Unless otherwise noted in HPI above, patient denies fever, chills, headache, shortness of breath, chest pain, abdominal pain, nausea, vomiting, diarrhea, lightheadedness, dysuria, hematuria, hematochezia, and melena.    Medical Decision Making/Differential Diagnosis/ED Course:    CC/HPI Summary, Social Determinants of health, Records Reviewed, DDx, testing done/not done, ED Course, Reassessment, disposition considerations/shared decision making with patient, consults, disposition:      Is this patient to be included in the SEP-1 core measure? No Exclusion criteria - the patient is NOT to be included for SEP-1 Core Measure due to: Infection is not suspected      ED Course as of 04/22/25 1942 Tue Apr 22, 2025 1934 I spoke  Glucose 132 (*)     BUN 26 (*)     Creatinine 1.6 (*)     Est, Glom Filt Rate 41 (*)     All other components within normal limits   CBC WITH AUTO DIFFERENTIAL - Abnormal; Notable for the following components:    Hemoglobin 12.0 (*)     MCHC 31.9 (*)     RDW 16.1 (*)     Lymphocytes % 19 (*)     Lymphocytes Absolute 0.98 (*)     All other components within normal limits   TROPONIN - Abnormal; Notable for the following components:    Troponin, High Sensitivity 40 (*)     All other components within normal limits   PROTIME-INR - Abnormal; Notable for the following components:    Protime 36.9 (*)     All other components within normal limits   PROTIME-INR       As interpreted by me, the above displayed labs are abnormal. All other labs obtained during this visit were within normal range or not returned as of this dictation.    EKG in clinical impression    Radiology reads  Interpretation per the Radiologist below, if available at the time of this note:    CT HEAD WO CONTRAST   Final Result   1. No acute intracranial abnormality.   2. Left maxillary sinusitis.         CT CERVICAL SPINE WO CONTRAST   Final Result   1. No acute abnormality of the cervical spine.   2. Focal area of ground-glass opacity in the left lung apex.         CT CHEST WO CONTRAST   Final Result   1. Cardiomegaly with interstitial edema pattern. No significant   decompensation or effusion.  No separate consolidation to suggest acute   bronchopneumonia   2. Emphysematous changes with biapical pleuroparenchymal scarring.   3. Mildly enlarged potentially reactive mediastinal adenopathy.   4. Coronary and valvular calcifications.   5. Given high risk features recommend follow-up to complete resolution to   exclude underlying potentially concealed nodularity or masses.         XR CHEST PORTABLE   Final Result   1. Cardiomegaly with pulmonary vascular congestion.   2. Hazy retrocardiac density may represent atelectasis or developing   pneumonia.   3.  verbal cues/1 person assist

## 2025-04-22 NOTE — CARE COORDINATION
Internal Medicine On-call Care Coordination Note    I was called by the ED physician because they recommended admission for this patient and I cover their PCP.  The history as I understand it after discussion with the ED physician is as follows:    Presents with syncope   EKG showing atrial flutter  Initially bradycardic, improved to ~60 in ED  Decision made for admission    I placed admission orders.  Including:    Cardiology consult  TSH  Telemetry  Hold home metoprolol    Dr. Bennett or his coverage will see the patient tomorrow for H&P.    Electronically signed by Blaine Garcia PA-C on 4/22/2025 at 7:29 PM

## 2025-04-23 ENCOUNTER — APPOINTMENT (OUTPATIENT)
Age: 85
DRG: 312 | End: 2025-04-23
Attending: INTERNAL MEDICINE
Payer: MEDICARE

## 2025-04-23 PROBLEM — R00.1 BRADYCARDIA: Status: ACTIVE | Noted: 2025-04-23

## 2025-04-23 LAB
ALBUMIN SERPL-MCNC: 3.3 G/DL (ref 3.5–5.2)
ALP SERPL-CCNC: 73 U/L (ref 40–129)
ALT SERPL-CCNC: 23 U/L (ref 0–40)
ANION GAP SERPL CALCULATED.3IONS-SCNC: 12 MMOL/L (ref 7–16)
AST SERPL-CCNC: 31 U/L (ref 0–39)
BASOPHILS # BLD: 0.05 K/UL (ref 0–0.2)
BASOPHILS NFR BLD: 1 % (ref 0–2)
BILIRUB SERPL-MCNC: 0.7 MG/DL (ref 0–1.2)
BUN SERPL-MCNC: 27 MG/DL (ref 6–23)
CALCIUM SERPL-MCNC: 9.2 MG/DL (ref 8.6–10.2)
CHLORIDE SERPL-SCNC: 106 MMOL/L (ref 98–107)
CHOLEST SERPL-MCNC: 105 MG/DL
CO2 SERPL-SCNC: 22 MMOL/L (ref 22–29)
CREAT SERPL-MCNC: 1.5 MG/DL (ref 0.7–1.2)
ECHO AO ASC DIAM: 3.1 CM
ECHO AO ASCENDING AORTA INDEX: 1.87 CM/M2
ECHO AV AREA PEAK VELOCITY: 0.7 CM2
ECHO AV AREA PLAN/BSA: 0.66 CM2/M2
ECHO AV AREA PLAN: 1.1 CM2
ECHO AV AREA VTI: 0.8 CM2
ECHO AV AREA/BSA PEAK VELOCITY: 0.4 CM2/M2
ECHO AV AREA/BSA VTI: 0.5 CM2/M2
ECHO AV CUSP MM: 1.5 CM
ECHO AV MEAN GRADIENT: 21 MMHG
ECHO AV MEAN VELOCITY: 2.2 M/S
ECHO AV PEAK GRADIENT: 49 MMHG
ECHO AV PEAK VELOCITY: 3.5 M/S
ECHO AV VELOCITY RATIO: 0.2
ECHO AV VTI: 75.5 CM
ECHO BSA: 1.68 M2
ECHO LA DIAMETER INDEX: 3.37 CM/M2
ECHO LA DIAMETER: 5.6 CM
ECHO LA VOL A-L A2C: 164 ML (ref 18–58)
ECHO LA VOL A-L A4C: 173 ML (ref 18–58)
ECHO LA VOL MOD A2C: 156 ML (ref 18–58)
ECHO LA VOL MOD A4C: 162 ML (ref 18–58)
ECHO LA VOLUME AREA LENGTH: 172 ML
ECHO LA VOLUME INDEX A-L A2C: 99 ML/M2 (ref 16–34)
ECHO LA VOLUME INDEX A-L A4C: 104 ML/M2 (ref 16–34)
ECHO LA VOLUME INDEX AREA LENGTH: 104 ML/M2 (ref 16–34)
ECHO LA VOLUME INDEX MOD A2C: 94 ML/M2 (ref 16–34)
ECHO LA VOLUME INDEX MOD A4C: 98 ML/M2 (ref 16–34)
ECHO LV EDV A2C: 121 ML
ECHO LV EDV A4C: 133 ML
ECHO LV EDV BP: 127 ML (ref 67–155)
ECHO LV EDV INDEX A4C: 80 ML/M2
ECHO LV EDV INDEX BP: 77 ML/M2
ECHO LV EDV NDEX A2C: 73 ML/M2
ECHO LV EF PHYSICIAN: 45 %
ECHO LV EJECTION FRACTION A2C: 18 %
ECHO LV EJECTION FRACTION A4C: 33 %
ECHO LV ESV A2C: 99 ML
ECHO LV ESV A4C: 89 ML
ECHO LV ESV BP: 93 ML (ref 22–58)
ECHO LV ESV INDEX A2C: 60 ML/M2
ECHO LV ESV INDEX A4C: 54 ML/M2
ECHO LV ESV INDEX BP: 56 ML/M2
ECHO LV FRACTIONAL SHORTENING: 13 % (ref 28–44)
ECHO LV INTERNAL DIMENSION DIASTOLE INDEX: 3.13 CM/M2
ECHO LV INTERNAL DIMENSION DIASTOLIC: 5.2 CM (ref 4.2–5.9)
ECHO LV INTERNAL DIMENSION SYSTOLIC INDEX: 2.71 CM/M2
ECHO LV INTERNAL DIMENSION SYSTOLIC: 4.5 CM
ECHO LV IVSD: 1.5 CM (ref 0.6–1)
ECHO LV IVSS: 1.8 CM
ECHO LV MASS 2D: 293.8 G (ref 88–224)
ECHO LV MASS INDEX 2D: 177 G/M2 (ref 49–115)
ECHO LV POSTERIOR WALL DIASTOLIC: 1.2 CM (ref 0.6–1)
ECHO LV POSTERIOR WALL SYSTOLIC: 0.9 CM
ECHO LV RELATIVE WALL THICKNESS RATIO: 0.46
ECHO LVOT AREA: 3.5 CM2
ECHO LVOT AV VTI INDEX: 0.21
ECHO LVOT DIAM: 2.1 CM
ECHO LVOT MEAN GRADIENT: 1 MMHG
ECHO LVOT PEAK GRADIENT: 2 MMHG
ECHO LVOT PEAK VELOCITY: 0.7 M/S
ECHO LVOT STROKE VOLUME INDEX: 33.2 ML/M2
ECHO LVOT SV: 55 ML
ECHO LVOT VTI: 15.9 CM
ECHO MV "A" WAVE DURATION: 166.1 MSEC
ECHO MV A VELOCITY: 0.5 M/S
ECHO MV AREA PHT: 2.6 CM2
ECHO MV AREA VTI: 1.4 CM2
ECHO MV E DECELERATION TIME (DT): 177.6 MS
ECHO MV E VELOCITY: 1.51 M/S
ECHO MV E/A RATIO: 3.02
ECHO MV EROA PISA: 0.3 CM2
ECHO MV LVOT VTI INDEX: 2.4
ECHO MV MAX VELOCITY: 1.5 M/S
ECHO MV MEAN GRADIENT: 3 MMHG
ECHO MV MEAN VELOCITY: 0.7 M/S
ECHO MV PEAK GRADIENT: 9 MMHG
ECHO MV PRESSURE HALF TIME (PHT): 85.1 MS
ECHO MV REGURGITANT ALIASING (NYQUIST) VELOCITY: 40 CM/S
ECHO MV REGURGITANT RADIUS PISA: 0.85 CM
ECHO MV REGURGITANT VELOCITY PISA: 6 M/S
ECHO MV REGURGITANT VOLUME PISA: 73.5 ML
ECHO MV REGURGITANT VTIA: 243 CM
ECHO MV VTI: 38.1 CM
ECHO PULMONARY ARTERY END DIASTOLIC PRESSURE: 3 MMHG
ECHO PV MAX VELOCITY: 0.8 M/S
ECHO PV MEAN GRADIENT: 2 MMHG
ECHO PV MEAN VELOCITY: 0.6 M/S
ECHO PV PEAK GRADIENT: 3 MMHG
ECHO PV REGURGITANT MAX VELOCITY: 0.8 M/S
ECHO PV VTI: 23.2 CM
ECHO RV INTERNAL DIMENSION: 2.9 CM
ECHO RV TAPSE: 1.3 CM (ref 1.7–?)
ECHO TV REGURGITANT MAX VELOCITY: 3.31 M/S
ECHO TV REGURGITANT PEAK GRADIENT: 44 MMHG
EOSINOPHIL # BLD: 0.17 K/UL (ref 0.05–0.5)
EOSINOPHILS RELATIVE PERCENT: 3 % (ref 0–6)
ERYTHROCYTE [DISTWIDTH] IN BLOOD BY AUTOMATED COUNT: 16.1 % (ref 11.5–15)
GFR, ESTIMATED: 44 ML/MIN/1.73M2
GLUCOSE BLD-MCNC: 114 MG/DL (ref 74–99)
GLUCOSE BLD-MCNC: 120 MG/DL (ref 74–99)
GLUCOSE BLD-MCNC: 142 MG/DL (ref 74–99)
GLUCOSE SERPL-MCNC: 123 MG/DL (ref 74–99)
HBA1C MFR BLD: 6.4 % (ref 4–5.6)
HCT VFR BLD AUTO: 37.9 % (ref 37–54)
HDLC SERPL-MCNC: 38 MG/DL
HGB BLD-MCNC: 11.7 G/DL (ref 12.5–16.5)
IMM GRANULOCYTES # BLD AUTO: <0.03 K/UL (ref 0–0.58)
IMM GRANULOCYTES NFR BLD: 0 % (ref 0–5)
INR PPP: 2.7
LDLC SERPL CALC-MCNC: 48 MG/DL
LEFT VENTRICULAR EJECTION FRACTION HIGH VALUE: 45 %
LEFT VENTRICULAR EJECTION FRACTION MODE: NORMAL
LV EF: 40 %
LYMPHOCYTES NFR BLD: 1.4 K/UL (ref 1.5–4)
LYMPHOCYTES RELATIVE PERCENT: 25 % (ref 20–42)
MAGNESIUM SERPL-MCNC: 2 MG/DL (ref 1.6–2.6)
MCH RBC QN AUTO: 29 PG (ref 26–35)
MCHC RBC AUTO-ENTMCNC: 30.9 G/DL (ref 32–34.5)
MCV RBC AUTO: 94 FL (ref 80–99.9)
MONOCYTES NFR BLD: 0.63 K/UL (ref 0.1–0.95)
MONOCYTES NFR BLD: 11 % (ref 2–12)
NEUTROPHILS NFR BLD: 60 % (ref 43–80)
NEUTS SEG NFR BLD: 3.33 K/UL (ref 1.8–7.3)
PLATELET # BLD AUTO: 157 K/UL (ref 130–450)
PMV BLD AUTO: 11.4 FL (ref 7–12)
POTASSIUM SERPL-SCNC: 4.2 MMOL/L (ref 3.5–5)
PROT SERPL-MCNC: 7.4 G/DL (ref 6.4–8.3)
PROTHROMBIN TIME: 29.5 SEC (ref 9.3–12.4)
RBC # BLD AUTO: 4.03 M/UL (ref 3.8–5.8)
SODIUM SERPL-SCNC: 140 MMOL/L (ref 132–146)
TRIGL SERPL-MCNC: 93 MG/DL
TSH SERPL DL<=0.05 MIU/L-ACNC: 1.48 UIU/ML (ref 0.27–4.2)
VLDLC SERPL CALC-MCNC: 19 MG/DL
WBC OTHER # BLD: 5.6 K/UL (ref 4.5–11.5)

## 2025-04-23 PROCEDURE — 6370000000 HC RX 637 (ALT 250 FOR IP): Performed by: PHYSICIAN ASSISTANT

## 2025-04-23 PROCEDURE — 2500000003 HC RX 250 WO HCPCS: Performed by: PHYSICIAN ASSISTANT

## 2025-04-23 PROCEDURE — G0378 HOSPITAL OBSERVATION PER HR: HCPCS

## 2025-04-23 PROCEDURE — 80061 LIPID PANEL: CPT

## 2025-04-23 PROCEDURE — 82962 GLUCOSE BLOOD TEST: CPT

## 2025-04-23 PROCEDURE — 99223 1ST HOSP IP/OBS HIGH 75: CPT | Performed by: INTERNAL MEDICINE

## 2025-04-23 PROCEDURE — C8929 TTE W OR WO FOL WCON,DOPPLER: HCPCS

## 2025-04-23 PROCEDURE — 83036 HEMOGLOBIN GLYCOSYLATED A1C: CPT

## 2025-04-23 PROCEDURE — APPSS60 APP SPLIT SHARED TIME 46-60 MINUTES

## 2025-04-23 PROCEDURE — 85610 PROTHROMBIN TIME: CPT

## 2025-04-23 PROCEDURE — 85025 COMPLETE CBC W/AUTO DIFF WBC: CPT

## 2025-04-23 PROCEDURE — 93306 TTE W/DOPPLER COMPLETE: CPT | Performed by: INTERNAL MEDICINE

## 2025-04-23 PROCEDURE — 80053 COMPREHEN METABOLIC PANEL: CPT

## 2025-04-23 PROCEDURE — 93005 ELECTROCARDIOGRAM TRACING: CPT

## 2025-04-23 PROCEDURE — 97161 PT EVAL LOW COMPLEX 20 MIN: CPT

## 2025-04-23 PROCEDURE — 84443 ASSAY THYROID STIM HORMONE: CPT

## 2025-04-23 PROCEDURE — 83735 ASSAY OF MAGNESIUM: CPT

## 2025-04-23 RX ORDER — DEXTROSE MONOHYDRATE 100 MG/ML
INJECTION, SOLUTION INTRAVENOUS CONTINUOUS PRN
Status: DISCONTINUED | OUTPATIENT
Start: 2025-04-23 | End: 2025-04-25 | Stop reason: HOSPADM

## 2025-04-23 RX ORDER — GLUCAGON 1 MG/ML
1 KIT INJECTION PRN
Status: DISCONTINUED | OUTPATIENT
Start: 2025-04-23 | End: 2025-04-25 | Stop reason: HOSPADM

## 2025-04-23 RX ORDER — INSULIN LISPRO 100 [IU]/ML
0-4 INJECTION, SOLUTION INTRAVENOUS; SUBCUTANEOUS
Status: DISCONTINUED | OUTPATIENT
Start: 2025-04-23 | End: 2025-04-25 | Stop reason: HOSPADM

## 2025-04-23 RX ADMIN — ATORVASTATIN CALCIUM 20 MG: 20 TABLET, FILM COATED ORAL at 20:46

## 2025-04-23 RX ADMIN — LOSARTAN POTASSIUM 25 MG: 25 TABLET, FILM COATED ORAL at 08:03

## 2025-04-23 RX ADMIN — PANTOPRAZOLE SODIUM 40 MG: 40 TABLET, DELAYED RELEASE ORAL at 06:17

## 2025-04-23 RX ADMIN — SODIUM CHLORIDE, PRESERVATIVE FREE 10 ML: 5 INJECTION INTRAVENOUS at 20:47

## 2025-04-23 RX ADMIN — ASPIRIN 81 MG: 81 TABLET, COATED ORAL at 08:03

## 2025-04-23 RX ADMIN — SODIUM CHLORIDE, PRESERVATIVE FREE 10 ML: 5 INJECTION INTRAVENOUS at 08:04

## 2025-04-23 RX ADMIN — FUROSEMIDE 20 MG: 20 TABLET ORAL at 08:03

## 2025-04-23 ASSESSMENT — PAIN SCALES - GENERAL: PAINLEVEL_OUTOF10: 0

## 2025-04-23 NOTE — PROGRESS NOTES
P Quality Flow/Interdisciplinary Rounds Progress Note        Quality Flow Rounds held on April 23, 2025    Disciplines Attending:  Bedside Nurse, , , and Nursing Unit Leadership    Trace B Patris was admitted on 4/22/2025  1:14 PM    Anticipated Discharge Date:       Disposition:    Vidal Score:  Vidal Scale Score: 21    [facility] RISK OF UNPLANNED READMISSION 2.0             0 Total Score        Discussed patient goal for the day, patient clinical progression, and barriers to discharge.  The following Goal(s) of the Day/Commitment(s) have been identified:   echo, NPO-cardio eval, pt ot eval, monitor HR and BP      Tia Lyons RN  April 23, 2025

## 2025-04-23 NOTE — PROGRESS NOTES
Occupational Therapy  OT consult received to eval/treat and chart review complete. Patient reports he is functioning at baseline level of independent and did not feel as though his current hospital admission was related to any functional deficits. Patient reports he has been up independently since admission and has no concerns related to functional abilities. Patient politely declines need for OT evaluation at this time. No OT evaluation complete per patient preference.      Nai Clifford OTR/L  License Number: [id number]

## 2025-04-23 NOTE — ED NOTES
ED to Inpatient Handoff Report    Notified D that electronic handoff available and patient ready for transport to room 630.    Safety Risks: Risk of falls    Patient in Restraints: no    Constant Observer or Patient : no    Telemetry Monitoring Ordered: Yes     Cardiac Rhythm: Sinus merna    Order to transfer to unit without monitor: NO    Last MEWS: 1 Time completed: 20:05    Deterioration Index: 33.76    Vitals:    04/22/25 1944 04/22/25 1948 04/22/25 1953 04/22/25 2005   BP:    (!) 172/100   Pulse: 51 57 (!) 48 52   Resp: 22 25 18 16   Temp:    97.9 °F (36.6 °C)   SpO2:    96%       Opportunity for questions and clarification was provided.

## 2025-04-23 NOTE — CARE COORDINATION
Pt w/syncope, bradycardia noted w/echo completed and EF at 27% - await further cardiac plans. Pt was on coumadin prior to arrival, eliquis to initiate when INR less than two, CM provided an Eliquis savings card. Pt is from home and independent. He has two sons close by to assist if needed. Pt is established w/Dr. Garcia. He denies any needs and will return home w/sons to transport. CM will follow.  NADJA MillerN, RN  [facility] Case Management  [phone]

## 2025-04-23 NOTE — PROGRESS NOTES
4 Eyes Skin Assessment     NAME:  Trace Nassar  YOB: 1940  MEDICAL RECORD NUMBER:  [id number]    The patient is being assessed for  Admission    I agree that at least one RN has performed a thorough Head to Toe Skin Assessment on the patient. ALL assessment sites listed below have been assessed.      Areas assessed by both nurses:    Head, face, hands arms chest, back, feet and heels        Does the Patient have a Wound? Yes wound(s) were present on assessment. LDA wound assessment was Initiated and completed by RN. Small abrasion to the right heel       Vidal Prevention initiated by RN: No  Wound Care Orders initiated by RN: No    Pressure Injury (Stage 3,4, Unstageable, DTI, NWPT, and Complex wounds) if present, place Wound referral order by RN under : No    New Ostomies, if present place, Ostomy referral order under : No     Nurse 1 eSignature: Electronically signed by Yue Quezada RN on 4/22/25 at 11:49 PM EDT    **SHARE this note so that the co-signing nurse can place an eSignature**    Nurse 2 eSignature: Electronically signed by Selene Joy RN on 4/22/25 at 11:50 PM EDT

## 2025-04-23 NOTE — PROGRESS NOTES
Patient did not allow this nurse to remove his pants to due cande le, groin or buttock skin assessment

## 2025-04-23 NOTE — PROGRESS NOTES
Notified Dr. Looney about patients HR dropping down into the upper 20s with a consistent rate in the low 30s to upper 40s. Gave the latest set of vitals as well.

## 2025-04-23 NOTE — PROGRESS NOTES
Physical Therapy  Facility/Department: [address] INTERMEDIATE  Physical Therapy Initial Assessment    Name: Trace Nassar  : 1940  MRN: [id number]  Date of Service: 2025           Patient Diagnosis(es): The primary encounter diagnosis was Lightheadedness. Diagnoses of Syncope and collapse, Near syncope, Nonrheumatic aortic valve stenosis, Atrial fibrillation, unspecified type (HCC), and Syncope, unspecified syncope type were also pertinent to this visit.  Past Medical History:  has a past medical history of A-fib (HCC), A-fib (HCC), Arthritis, CAD (coronary artery disease), CHF (congestive heart failure) (HCC), COPD with acute exacerbation (HCC), Diastolic heart failure (HCC), Hyperlipidemia, Hypertension, and Unspecified cerebral artery occlusion with cerebral infarction.  Past Surgical History:  has a past surgical history that includes Coronary angioplasty with stent (2014); Coronary angioplasty with stent (2014); Cardioversion (2014); Colonoscopy; hernia repair; Abdomen surgery; eye surgery; Appendectomy; and Carotid endarterectomy (Left, 2015).      Requires PT Follow-Up: Yes    Evaluating Therapist: Elsa Coker PT     Referring Provider:    Blaine Garcia PA-C    PT order : PT eval and treat     Room #: 630   DIAGNOSIS: The primary encounter diagnosis was Lightheadedness. Diagnoses of Syncope and collapse, Near syncope, Nonrheumatic aortic valve stenosis, Atrial fibrillation, unspecified type (HCC), and Syncope, unspecified syncope type were also pertinent to this visit.    PRECAUTIONS: falls     Social:  Pt lives alone  in a  bilevel floor plan  5  steps and 1 rails to enter and between levels .  Prior to admission pt walked with  no AD, independent      Initial Evaluation  Date:  2025 Treatment      Short Term/ Long Term   Goals   Was pt agreeable to Eval/treatment?  Yes      Does pt have pain?  None reported      Bed Mobility  Rolling:  independent   Supine to

## 2025-04-23 NOTE — CONSULTS
Inpatient Cardiology Consultation      Reason for Consult: Syncope, bradycardia    Consulting Physician: Dr. Arora     Requesting Physician:  Dr. Garcia     Date of Consultation: 4/23/2025    HISTORY OF PRESENT ILLNESS:   Trace Nassar  is a 85 y.o.  male known to [facility] cardiology Dr. Layne last seen in office 4/17/2025 for routine follow-up, EKG noted A-fib with HR 39 bpm.           ED report: Presented 4/22/2025 for syncope while shopping at [employer], he became very weak and when he reached out to grab something fell.  Unknown if LOC.  EMS reported atrial flutter, confirmed by EKG in ED.   Labs: Troponin 36> 40 NT proBNP 14,907 K4.8> 4.2 BUN 26> 27 CR 1.6> 1.5 albumin 3.3 elevated LFTs WNL TSH 1.48 WBC 5.2 Hgb 12.0 (chronic anemia)   RAD:   4/22 PCXR: Cardiomegaly with pulmonary vascular congestion. Hazy retrocardiac density may represent atelectasis or developing pneumonia. Possible small left pleural effusion.   4/22 CT chest WO:   1. Cardiomegaly with interstitial edema pattern. No significant  decompensation or effusion.  No separate consolidation to suggest acute  bronchopneumonia  2. Emphysematous changes with biapical pleuroparenchymal scarring.  3. Mildly enlarged potentially reactive mediastinal adenopathy.  4. Coronary and valvular calcifications.  5. Given high risk features recommend follow-up to complete resolution to  exclude underlying potentially concealed nodularity or masses.  4/22 CT head/cervical spine WO: No acute intracranial abnormality. Focal area of ground-glass opacity in the left lung apex.     ED treatment: Bacitracin ointment  Active cardiac Meds: Aspirin 81 Mg, Lipitor 20 Mg daily, Lovenox 30 Mg daily, Lasix 20 Mg p.o. daily, losartan 25 Mg daily, Coumadin 1.5 Mg daily    Patient seen and examined.  Recent vitals: /53 HR 40 afebrile O2 sat 95% RA no intake and output documented WT 140lb (unknown method)     4/23/2025:   Assessed resting in bed, no family

## 2025-04-23 NOTE — H&P
Internal Medicine History & Physical     Name: Trace Nassar  : 1940  Chief Complaint: Dizziness (Pt was k and dizzy a [employer] club and brought in for evaluation)  Primary Care Physician: Silvino Garcia DO  Admission date: 2025  Date of service: 2025     History of Present Illness  Trace is a 85 y.o. year old male with a past medical history of hypertension, hyperlipidemia, CAD, diastolic HF and atrial fibrillation anticoagulated with Coumadin. He presented to the ER on  after sustaining a syncopal episode while at the store.  He reports that he woke up yesterday morning in his normal state of health.  He had breakfast and then decided to go to the store.  He reports that while in the store he was walking when he suddenly felt weak and \"off\".  He reports that he grabbed onto a pillow in the store to steady himself but then has no recollection of the events after that.  He reports that he did wake up on the floor surrounded by EMS.  He reports that he really did not feel particularly dizzy just \"off\".  He denies any fever or chills.  Denies any shortness of breath or difficulty breathing.  He denies any nausea or vomiting.  He is unsure of how long he was unconscious for but thinks it was around 5 minutes.  His symptoms were constant and severe, nothing in particular seems to make it better or worse.  Given the syncopal episode he was brought to the ER for further evaluation and management.  Upon arrival to the ER he was noted to be bradycardic but was otherwise hemodynamically stable. EKG noted atrial fibrillation with slow ventricular response. Lab work was obtaned and noted BUN 26, creatinine 1.6, WBC 5.2, hemoglobin 12.0, pro-BNP 14,970 and troponin trend 36 followed by 40. CT head, chest and cervical spine were obtained and showed no acute findings. Given the persistent bradycardia and syncopal episode decision was made to admit for further management.    Currently he is seen  medical decision making on the date of service and I agree with all of the pertinent clinical information unless indicated in my editing of the note. I have reviewed and edited the note above based on my findings during my history, exam, and decision making on the same day of service.     My additional thoughts:   He presented to the hospital with a chief complaint of a syncopal episode while he was at the grocery store likely  He states that prior to the episode he was feeling extremely  He thinks that he was unconscious for about 5 minutes  He does have a history of CAD and has 2 stents in place  He has a history of atrial fibrillation and is on Coumadin  His heart rate was found to be slow and beta-blocker was held  Continue Coumadin and pharmacy is dosing  I think it is reasonable to check a stress test and echocardiogram as well as continue to monitor telemetry monitoring prior to determining her next step with regard to his medical care    Electronically signed by Pablo Bennett DO on 4/23/2025 at 10:33 AM    I can be reached through [employer].

## 2025-04-23 NOTE — PLAN OF CARE
Problem: ABCDS Injury Assessment  Goal: Absence of physical injury  4/23/2025 0909 by Patty Hendricks, RN  Outcome: Progressing  4/23/2025 0059 by Rianna Tran, RN  Outcome: Progressing

## 2025-04-24 LAB
ALBUMIN SERPL-MCNC: 3.1 G/DL (ref 3.5–5.2)
ALP SERPL-CCNC: 60 U/L (ref 40–129)
ALT SERPL-CCNC: 21 U/L (ref 0–40)
ANION GAP SERPL CALCULATED.3IONS-SCNC: 13 MMOL/L (ref 7–16)
AST SERPL-CCNC: 26 U/L (ref 0–39)
BASOPHILS # BLD: 0.04 K/UL (ref 0–0.2)
BASOPHILS NFR BLD: 1 % (ref 0–2)
BILIRUB SERPL-MCNC: 0.8 MG/DL (ref 0–1.2)
BUN SERPL-MCNC: 26 MG/DL (ref 6–23)
CALCIUM SERPL-MCNC: 9.1 MG/DL (ref 8.6–10.2)
CHLORIDE SERPL-SCNC: 105 MMOL/L (ref 98–107)
CO2 SERPL-SCNC: 21 MMOL/L (ref 22–29)
CREAT SERPL-MCNC: 1.4 MG/DL (ref 0.7–1.2)
EOSINOPHIL # BLD: 0.16 K/UL (ref 0.05–0.5)
EOSINOPHILS RELATIVE PERCENT: 3 % (ref 0–6)
ERYTHROCYTE [DISTWIDTH] IN BLOOD BY AUTOMATED COUNT: 16 % (ref 11.5–15)
GFR, ESTIMATED: 51 ML/MIN/1.73M2
GLUCOSE BLD-MCNC: 107 MG/DL (ref 74–99)
GLUCOSE BLD-MCNC: 108 MG/DL (ref 74–99)
GLUCOSE BLD-MCNC: 121 MG/DL (ref 74–99)
GLUCOSE SERPL-MCNC: 105 MG/DL (ref 74–99)
HCT VFR BLD AUTO: 36.4 % (ref 37–54)
HGB BLD-MCNC: 11.6 G/DL (ref 12.5–16.5)
IMM GRANULOCYTES # BLD AUTO: <0.03 K/UL (ref 0–0.58)
IMM GRANULOCYTES NFR BLD: 0 % (ref 0–5)
INR PPP: 2.3
LYMPHOCYTES NFR BLD: 1.42 K/UL (ref 1.5–4)
LYMPHOCYTES RELATIVE PERCENT: 28 % (ref 20–42)
MCH RBC QN AUTO: 29.4 PG (ref 26–35)
MCHC RBC AUTO-ENTMCNC: 31.9 G/DL (ref 32–34.5)
MCV RBC AUTO: 92.2 FL (ref 80–99.9)
MONOCYTES NFR BLD: 0.55 K/UL (ref 0.1–0.95)
MONOCYTES NFR BLD: 11 % (ref 2–12)
NEUTROPHILS NFR BLD: 57 % (ref 43–80)
NEUTS SEG NFR BLD: 2.91 K/UL (ref 1.8–7.3)
PLATELET # BLD AUTO: 149 K/UL (ref 130–450)
PMV BLD AUTO: 11.5 FL (ref 7–12)
POTASSIUM SERPL-SCNC: 4.1 MMOL/L (ref 3.5–5)
PROT SERPL-MCNC: 6.8 G/DL (ref 6.4–8.3)
PROTHROMBIN TIME: 25.4 SEC (ref 9.3–12.4)
RBC # BLD AUTO: 3.95 M/UL (ref 3.8–5.8)
SODIUM SERPL-SCNC: 139 MMOL/L (ref 132–146)
WBC OTHER # BLD: 5.1 K/UL (ref 4.5–11.5)

## 2025-04-24 PROCEDURE — 2060000000 HC ICU INTERMEDIATE R&B

## 2025-04-24 PROCEDURE — 80053 COMPREHEN METABOLIC PANEL: CPT

## 2025-04-24 PROCEDURE — 6370000000 HC RX 637 (ALT 250 FOR IP): Performed by: INTERNAL MEDICINE

## 2025-04-24 PROCEDURE — 85025 COMPLETE CBC W/AUTO DIFF WBC: CPT

## 2025-04-24 PROCEDURE — 2500000003 HC RX 250 WO HCPCS: Performed by: PHYSICIAN ASSISTANT

## 2025-04-24 PROCEDURE — 2580000003 HC RX 258: Performed by: INTERNAL MEDICINE

## 2025-04-24 PROCEDURE — G0378 HOSPITAL OBSERVATION PER HR: HCPCS

## 2025-04-24 PROCEDURE — 99233 SBSQ HOSP IP/OBS HIGH 50: CPT | Performed by: INTERNAL MEDICINE

## 2025-04-24 PROCEDURE — 6370000000 HC RX 637 (ALT 250 FOR IP): Performed by: PHYSICIAN ASSISTANT

## 2025-04-24 PROCEDURE — 82962 GLUCOSE BLOOD TEST: CPT

## 2025-04-24 PROCEDURE — 85610 PROTHROMBIN TIME: CPT

## 2025-04-24 RX ORDER — SODIUM CHLORIDE 0.9 % (FLUSH) 0.9 %
10 SYRINGE (ML) INJECTION EVERY 12 HOURS SCHEDULED
Status: CANCELLED | OUTPATIENT
Start: 2025-04-24

## 2025-04-24 RX ORDER — ACETAMINOPHEN 325 MG/1
650 TABLET ORAL EVERY 6 HOURS PRN
Status: CANCELLED | OUTPATIENT
Start: 2025-04-24

## 2025-04-24 RX ORDER — ONDANSETRON 4 MG/1
4 TABLET, ORALLY DISINTEGRATING ORAL EVERY 8 HOURS PRN
Status: CANCELLED | OUTPATIENT
Start: 2025-04-24

## 2025-04-24 RX ORDER — INSULIN LISPRO 100 [IU]/ML
0-4 INJECTION, SOLUTION INTRAVENOUS; SUBCUTANEOUS
Status: CANCELLED | OUTPATIENT
Start: 2025-04-24

## 2025-04-24 RX ORDER — GLUCAGON 1 MG/ML
1 KIT INJECTION PRN
Status: CANCELLED | OUTPATIENT
Start: 2025-04-24

## 2025-04-24 RX ORDER — SODIUM CHLORIDE 9 MG/ML
INJECTION, SOLUTION INTRAVENOUS CONTINUOUS
Status: DISCONTINUED | OUTPATIENT
Start: 2025-04-25 | End: 2025-04-25

## 2025-04-24 RX ORDER — SODIUM CHLORIDE 9 MG/ML
INJECTION, SOLUTION INTRAVENOUS PRN
Status: CANCELLED | OUTPATIENT
Start: 2025-04-24

## 2025-04-24 RX ORDER — ACETAMINOPHEN 650 MG/1
650 SUPPOSITORY RECTAL EVERY 6 HOURS PRN
Status: CANCELLED | OUTPATIENT
Start: 2025-04-24

## 2025-04-24 RX ORDER — ASPIRIN 81 MG/1
81 TABLET ORAL DAILY
Status: DISCONTINUED | OUTPATIENT
Start: 2025-04-24 | End: 2025-04-25 | Stop reason: HOSPADM

## 2025-04-24 RX ORDER — ATORVASTATIN CALCIUM 20 MG/1
20 TABLET, FILM COATED ORAL NIGHTLY
Status: CANCELLED | OUTPATIENT
Start: 2025-04-24

## 2025-04-24 RX ORDER — ONDANSETRON 2 MG/ML
4 INJECTION INTRAMUSCULAR; INTRAVENOUS EVERY 6 HOURS PRN
Status: CANCELLED | OUTPATIENT
Start: 2025-04-24

## 2025-04-24 RX ORDER — SODIUM CHLORIDE 0.9 % (FLUSH) 0.9 %
10 SYRINGE (ML) INJECTION PRN
Status: CANCELLED | OUTPATIENT
Start: 2025-04-24

## 2025-04-24 RX ORDER — PANTOPRAZOLE SODIUM 40 MG/1
40 TABLET, DELAYED RELEASE ORAL
Status: CANCELLED | OUTPATIENT
Start: 2025-04-25

## 2025-04-24 RX ORDER — LOSARTAN POTASSIUM 25 MG/1
25 TABLET ORAL DAILY
Status: CANCELLED | OUTPATIENT
Start: 2025-04-25

## 2025-04-24 RX ORDER — DEXTROSE MONOHYDRATE 100 MG/ML
INJECTION, SOLUTION INTRAVENOUS CONTINUOUS PRN
Status: CANCELLED | OUTPATIENT
Start: 2025-04-24

## 2025-04-24 RX ORDER — SENNOSIDES A AND B 8.6 MG/1
1 TABLET, FILM COATED ORAL DAILY PRN
Status: CANCELLED | OUTPATIENT
Start: 2025-04-24

## 2025-04-24 RX ADMIN — SODIUM CHLORIDE: 0.9 INJECTION, SOLUTION INTRAVENOUS at 23:57

## 2025-04-24 RX ADMIN — ASPIRIN 81 MG: 81 TABLET, COATED ORAL at 17:24

## 2025-04-24 RX ADMIN — LOSARTAN POTASSIUM 25 MG: 25 TABLET, FILM COATED ORAL at 08:17

## 2025-04-24 RX ADMIN — ATORVASTATIN CALCIUM 20 MG: 20 TABLET, FILM COATED ORAL at 20:32

## 2025-04-24 RX ADMIN — SODIUM CHLORIDE, PRESERVATIVE FREE 10 ML: 5 INJECTION INTRAVENOUS at 08:17

## 2025-04-24 RX ADMIN — SODIUM CHLORIDE, PRESERVATIVE FREE 10 ML: 5 INJECTION INTRAVENOUS at 20:32

## 2025-04-24 RX ADMIN — PANTOPRAZOLE SODIUM 40 MG: 40 TABLET, DELAYED RELEASE ORAL at 05:31

## 2025-04-24 NOTE — PROGRESS NOTES
Internal Medicine Progress Note    Patient's name: Trace Nassar  : 1940  Chief complaints (on day of admission): Dizziness (Pt was k and dizzy a [employer] and brought in for evaluation)  Admission date: 2025  Date of service: 2025   Room: [address] INTERMEDIATE  Primary care physician: Silvino Garcia DO  Reason for visit: Follow-up for syncope and collapse    Subjective  Trace is seen sitting in bed awake and alert, in no distress.  He reports feeling fairly well this morning.  He denies any shortness of breath or difficulty breathing.  Denies any nausea or vomiting.  He reports no further dizzy episodes.  Echocardiogram was obtained yesterday and noted EF 40 to 45% and severe aortic stenosis.  Cardiology is recommending a heart catheterization once INR less than 2 and CT surgery/structural heart evaluation.  No other issues or concerns from nursing.    Review of Systems  Full 10 point review of systems negative unless mentioned above.    Hospital Medications  Current Facility-Administered Medications   Medication Dose Route Frequency Provider Last Rate Last Admin    sulfur hexafluoride microspheres (LUMASON) 60.7-25 MG injection 2 mL  2 mL IntraVENous ONCE PRN Emory Arora MD        glucose chewable tablet 16 g  4 tablet Oral PRN Pablo Bennett, DO        dextrose bolus 10% 125 mL  125 mL IntraVENous PRN Pablo Bennett, DO        Or    dextrose bolus 10% 250 mL  250 mL IntraVENous PRN Pablo Bennett, DO        glucagon injection 1 mg  1 mg SubCUTAneous PRN Pablo Bennett, DO        dextrose 10 % infusion   IntraVENous Continuous PRN Pablo Bennett, DO        insulin lispro (HUMALOG,ADMELOG) injection vial 0-4 Units  0-4 Units SubCUTAneous 4x Daily AC & HS Pablo Bennett E, DO        sodium chloride flush 0.9 % injection 10 mL  10 mL IntraVENous 2 times per day Blaine Garcia PA-C   10 mL at 25    sodium chloride flush 0.9 % injection 10 mL  10 mL IntraVENous PRN

## 2025-04-24 NOTE — PLAN OF CARE
Problem: ABCDS Injury Assessment  Goal: Absence of physical injury  4/24/2025 1120 by Isi Martinez RN  Outcome: Progressing  4/24/2025 0042 by Jay Cm RN  Outcome: Progressing     Problem: Discharge Planning  Goal: Discharge to home or other facility with appropriate resources  4/24/2025 1120 by Isi Martinez RN  Outcome: Progressing  4/24/2025 0042 by Jay Cm RN  Outcome: Progressing     Problem: Chronic Conditions and Co-morbidities  Goal: Patient's chronic conditions and co-morbidity symptoms are monitored and maintained or improved  4/24/2025 1120 by Isi Martinez RN  Outcome: Progressing  4/24/2025 0042 by Jay Cm RN  Outcome: Progressing     Problem: Safety - Adult  Goal: Free from fall injury  4/24/2025 1120 by Isi Martinez RN  Outcome: Progressing  4/24/2025 0042 by Jay Cm RN  Outcome: Progressing

## 2025-04-24 NOTE — PROGRESS NOTES
P Quality Flow/Interdisciplinary Rounds Progress Note        Quality Flow Rounds held on April 24, 2025    Disciplines Attending:  Bedside Nurse, , , and Nursing Unit Leadership    Trace B Patris was admitted on 4/22/2025  1:14 PM    Anticipated Discharge Date:  Expected Discharge Date: 04/25/25    Disposition:    Vidal Score:  Vidal Scale Score: 19    [facility] RISK OF UNPLANNED READMISSION 2.0             14 Total Score        Discussed patient goal for the day, patient clinical progression, and barriers to discharge.  The following Goal(s) of the Day/Commitment(s) have been identified:   discharge planning, transfer to [facility]      Ghulam Avendaño RN  April 24, 2025

## 2025-04-24 NOTE — PROGRESS NOTES
INPATIENT CARDIOLOGY FOLLOW-UP    Name: rTace Nassar    Age: 85 y.o.    Date of Admission: 4/22/2025  1:14 PM    Date of Service: 4/24/2025    Primary Cardiologist: Dr Layne    Chief Complaint: Follow-up for syncope, bradycardia, aortic stenosis    Interim History:  Feels well denies chest pain shortness of breath syncope.    Remains in A-fib 40s on the monitor.    Review of Systems:   Negative except as described above    Problem List:  Patient Active Problem List   Diagnosis    Atrial fibrillation (HCC)    Hyperlipidemia with target LDL less than 100    History of CVA (cerebrovascular accident)    CAD (coronary artery disease), native coronary artery    Mitral insufficiency    Carotid stenosis, asymptomatic, bilateral    Anemia    Nonrheumatic aortic valve stenosis    Heart failure with mid-range ejection fraction (HFmEF) (HCC)    Syncope and collapse    Bradycardia       Current Medications:    Current Facility-Administered Medications:     sulfur hexafluoride microspheres (LUMASON) 60.7-25 MG injection 2 mL, 2 mL, IntraVENous, ONCE PRN, Emory Arora MD    glucose chewable tablet 16 g, 4 tablet, Oral, PRN, Pablo Bennett, DO    dextrose bolus 10% 125 mL, 125 mL, IntraVENous, PRN **OR** dextrose bolus 10% 250 mL, 250 mL, IntraVENous, PRN, Pablo Bennett, DO    glucagon injection 1 mg, 1 mg, SubCUTAneous, PRN, Pablo Bennett, DO    dextrose 10 % infusion, , IntraVENous, Continuous PRN, Pablo Bennett, DO    insulin lispro (HUMALOG,ADMELOG) injection vial 0-4 Units, 0-4 Units, SubCUTAneous, 4x Daily AC & HS, Pablo Bennett, DO    sodium chloride flush 0.9 % injection 10 mL, 10 mL, IntraVENous, 2 times per day, Blaine Garcia PA-C, 10 mL at 04/24/25 0817    sodium chloride flush 0.9 % injection 10 mL, 10 mL, IntraVENous, PRN, Blaine Garcia PA-C    0.9 % sodium chloride infusion, , IntraVENous, PRN, Jose Blaine, PA-C    ondansetron (ZOFRAN-ODT) disintegrating tablet 4 mg, 4 mg, Oral, Q8H  the 24 hours ending 04/24/25 1648  No intake/output data recorded.    Laboratory Tests:  Recent Labs     04/22/25  1428 04/23/25  0334 04/24/25  0330    140 139   K 4.8 4.2 4.1    106 105   CO2 23 22 21*   BUN 26* 27* 26*   CREATININE 1.6* 1.5* 1.4*   GLUCOSE 132* 123* 105*   CALCIUM 9.1 9.2 9.1     Lab Results   Component Value Date/Time    MG 2.0 04/23/2025 07:15 AM     Recent Labs     04/23/25  0334 04/24/25  0330   ALKPHOS 73 60   ALT 23 21   AST 31 26   BILITOT 0.7 0.8     Recent Labs     04/22/25  1428 04/23/25  0334 04/24/25  0330   WBC 5.2 5.6 5.1   RBC 4.04 4.03 3.95   HGB 12.0* 11.7* 11.6*   HCT 37.6 37.9 36.4*   MCV 93.1 94.0 92.2   MCH 29.7 29.0 29.4   MCHC 31.9* 30.9* 31.9*   RDW 16.1* 16.1* 16.0*    157 149   MPV 11.8 11.4 11.5     Lab Results   Component Value Date    CKTOTAL 1,393 (H) 07/30/2014    CKMB 116.3 (H) 07/30/2014    TROPONINI <0.01 05/26/2019    TROPONINI 1.44 (H) 08/04/2014    TROPONINI 1.61 (H) 08/03/2014     Lab Results   Component Value Date    INR 2.3 04/24/2025    INR 2.7 04/23/2025    INR 3.5 04/22/2025    PROTIME 25.4 (H) 04/24/2025    PROTIME 29.5 (H) 04/23/2025    PROTIME 36.9 (H) 04/22/2025     Lab Results   Component Value Date    TSH 1.48 04/23/2025     Lab Results   Component Value Date    LABA1C 6.4 (H) 04/23/2025     No results found for: \"EAG\"  Lab Results   Component Value Date    CHOL 105 04/23/2025    CHOL 185 07/22/2014     Lab Results   Component Value Date    TRIG 93 04/23/2025    TRIG 44 07/22/2014     Lab Results   Component Value Date    HDL 38 (L) 04/23/2025    HDL 88 07/22/2014     No components found for: \"LDLCALC\", \"LDLCHOLESTEROL\"  Lab Results   Component Value Date    VLDL 19 04/23/2025    VLDL 9 07/22/2014     No results found for: \"CHOLHDLRATIO\"  Recent Labs     04/22/25  1428   PROBNP 14,970*       Cardiac Tests:    EKG: Slow A-fib     Telemetry: A-fib 40s    Chest X-ray:     Impression:        1. No acute abnormality of the cervical

## 2025-04-25 ENCOUNTER — HOSPITAL ENCOUNTER (INPATIENT)
Age: 85
LOS: 10 days | Discharge: HOME | DRG: 324 | End: 2025-05-05
Attending: INTERNAL MEDICINE | Admitting: INTERNAL MEDICINE
Payer: MEDICARE

## 2025-04-25 VITALS
DIASTOLIC BLOOD PRESSURE: 73 MMHG | BODY MASS INDEX: 26.05 KG/M2 | TEMPERATURE: 98.2 F | WEIGHT: 147.05 LBS | RESPIRATION RATE: 18 BRPM | HEIGHT: 63 IN | SYSTOLIC BLOOD PRESSURE: 164 MMHG | HEART RATE: 63 BPM | OXYGEN SATURATION: 96 %

## 2025-04-25 DIAGNOSIS — R09.89 SYMPTOMS INVOLVING CARDIOVASCULAR SYSTEM: Primary | ICD-10-CM

## 2025-04-25 DIAGNOSIS — I25.10 CORONARY ARTERY DISEASE, UNSPECIFIED VESSEL OR LESION TYPE, UNSPECIFIED WHETHER ANGINA PRESENT, UNSPECIFIED WHETHER NATIVE OR TRANSPLANTED HEART: ICD-10-CM

## 2025-04-25 DIAGNOSIS — R07.9 CHEST PAIN, UNSPECIFIED TYPE: ICD-10-CM

## 2025-04-25 DIAGNOSIS — I48.91 ATRIAL FIBRILLATION, UNSPECIFIED TYPE (HCC): ICD-10-CM

## 2025-04-25 PROBLEM — I35.0 SEVERE AORTIC STENOSIS: Status: ACTIVE | Noted: 2025-04-25

## 2025-04-25 LAB
ALBUMIN SERPL-MCNC: 3.2 G/DL (ref 3.5–5.2)
ALP SERPL-CCNC: 58 U/L (ref 40–129)
ALT SERPL-CCNC: 17 U/L (ref 0–40)
ANION GAP SERPL CALCULATED.3IONS-SCNC: 12 MMOL/L (ref 7–16)
AST SERPL-CCNC: 21 U/L (ref 0–39)
BASOPHILS # BLD: 0.05 K/UL (ref 0–0.2)
BASOPHILS NFR BLD: 1 % (ref 0–2)
BILIRUB SERPL-MCNC: 1 MG/DL (ref 0–1.2)
BUN SERPL-MCNC: 22 MG/DL (ref 6–23)
CALCIUM SERPL-MCNC: 9.1 MG/DL (ref 8.6–10.2)
CHLORIDE SERPL-SCNC: 104 MMOL/L (ref 98–107)
CO2 SERPL-SCNC: 22 MMOL/L (ref 22–29)
CREAT SERPL-MCNC: 1.2 MG/DL (ref 0.7–1.2)
EKG ATRIAL RATE: 300 BPM
EKG ATRIAL RATE: 375 BPM
EKG Q-T INTERVAL: 518 MS
EKG Q-T INTERVAL: 524 MS
EKG QRS DURATION: 148 MS
EKG QRS DURATION: 148 MS
EKG QTC CALCULATION (BAZETT): 453 MS
EKG QTC CALCULATION (BAZETT): 453 MS
EKG R AXIS: 23 DEGREES
EKG R AXIS: 27 DEGREES
EKG T AXIS: -49 DEGREES
EKG T AXIS: -57 DEGREES
EKG VENTRICULAR RATE: 45 BPM
EKG VENTRICULAR RATE: 46 BPM
EOSINOPHIL # BLD: 0.17 K/UL (ref 0.05–0.5)
EOSINOPHILS RELATIVE PERCENT: 3 % (ref 0–6)
ERYTHROCYTE [DISTWIDTH] IN BLOOD BY AUTOMATED COUNT: 16 % (ref 11.5–15)
GFR, ESTIMATED: 60 ML/MIN/1.73M2
GLUCOSE BLD-MCNC: 100 MG/DL (ref 74–99)
GLUCOSE BLD-MCNC: 103 MG/DL (ref 74–99)
GLUCOSE BLD-MCNC: 109 MG/DL (ref 74–99)
GLUCOSE BLD-MCNC: 90 MG/DL (ref 74–99)
GLUCOSE SERPL-MCNC: 101 MG/DL (ref 74–99)
HCT VFR BLD AUTO: 36 % (ref 37–54)
HGB BLD-MCNC: 11.6 G/DL (ref 12.5–16.5)
IMM GRANULOCYTES # BLD AUTO: <0.03 K/UL (ref 0–0.58)
IMM GRANULOCYTES NFR BLD: 0 % (ref 0–5)
INR PPP: 1.8
INR PPP: 1.9
INR PPP: 2.1
LYMPHOCYTES NFR BLD: 1.43 K/UL (ref 1.5–4)
LYMPHOCYTES RELATIVE PERCENT: 24 % (ref 20–42)
MCH RBC QN AUTO: 29.3 PG (ref 26–35)
MCHC RBC AUTO-ENTMCNC: 32.2 G/DL (ref 32–34.5)
MCV RBC AUTO: 90.9 FL (ref 80–99.9)
MONOCYTES NFR BLD: 0.64 K/UL (ref 0.1–0.95)
MONOCYTES NFR BLD: 11 % (ref 2–12)
NEUTROPHILS NFR BLD: 61 % (ref 43–80)
NEUTS SEG NFR BLD: 3.66 K/UL (ref 1.8–7.3)
PLATELET # BLD AUTO: 157 K/UL (ref 130–450)
PMV BLD AUTO: 11.9 FL (ref 7–12)
POTASSIUM SERPL-SCNC: 4 MMOL/L (ref 3.5–5)
PROT SERPL-MCNC: 6.9 G/DL (ref 6.4–8.3)
PROTHROMBIN TIME: 19.5 SEC (ref 9.3–12.4)
PROTHROMBIN TIME: 20.3 SEC (ref 9.3–12.4)
PROTHROMBIN TIME: 22.1 SEC (ref 9.3–12.4)
RBC # BLD AUTO: 3.96 M/UL (ref 3.8–5.8)
SODIUM SERPL-SCNC: 138 MMOL/L (ref 132–146)
WBC OTHER # BLD: 6 K/UL (ref 4.5–11.5)

## 2025-04-25 PROCEDURE — 6370000000 HC RX 637 (ALT 250 FOR IP): Performed by: INTERNAL MEDICINE

## 2025-04-25 PROCEDURE — 80053 COMPREHEN METABOLIC PANEL: CPT

## 2025-04-25 PROCEDURE — 93010 ELECTROCARDIOGRAM REPORT: CPT | Performed by: INTERNAL MEDICINE

## 2025-04-25 PROCEDURE — 2140000000 HC CCU INTERMEDIATE R&B

## 2025-04-25 PROCEDURE — 85025 COMPLETE CBC W/AUTO DIFF WBC: CPT

## 2025-04-25 PROCEDURE — 85610 PROTHROMBIN TIME: CPT

## 2025-04-25 PROCEDURE — 99233 SBSQ HOSP IP/OBS HIGH 50: CPT | Performed by: INTERNAL MEDICINE

## 2025-04-25 PROCEDURE — 6370000000 HC RX 637 (ALT 250 FOR IP): Performed by: PHYSICIAN ASSISTANT

## 2025-04-25 PROCEDURE — 82962 GLUCOSE BLOOD TEST: CPT

## 2025-04-25 PROCEDURE — 2500000003 HC RX 250 WO HCPCS: Performed by: PHYSICIAN ASSISTANT

## 2025-04-25 RX ORDER — ONDANSETRON 2 MG/ML
4 INJECTION INTRAMUSCULAR; INTRAVENOUS EVERY 6 HOURS PRN
Status: DISCONTINUED | OUTPATIENT
Start: 2025-04-25 | End: 2025-05-05 | Stop reason: HOSPADM

## 2025-04-25 RX ORDER — AMLODIPINE BESYLATE 5 MG/1
5 TABLET ORAL ONCE
Status: COMPLETED | OUTPATIENT
Start: 2025-04-25 | End: 2025-04-25

## 2025-04-25 RX ORDER — SODIUM CHLORIDE 0.9 % (FLUSH) 0.9 %
10 SYRINGE (ML) INJECTION PRN
Status: DISCONTINUED | OUTPATIENT
Start: 2025-04-25 | End: 2025-05-05 | Stop reason: HOSPADM

## 2025-04-25 RX ORDER — DEXTROSE MONOHYDRATE 100 MG/ML
INJECTION, SOLUTION INTRAVENOUS CONTINUOUS PRN
Status: DISCONTINUED | OUTPATIENT
Start: 2025-04-25 | End: 2025-05-05 | Stop reason: HOSPADM

## 2025-04-25 RX ORDER — GLUCAGON 1 MG/ML
1 KIT INJECTION PRN
Status: DISCONTINUED | OUTPATIENT
Start: 2025-04-25 | End: 2025-05-05 | Stop reason: HOSPADM

## 2025-04-25 RX ORDER — ACETAMINOPHEN 325 MG/1
650 TABLET ORAL EVERY 6 HOURS PRN
Status: DISCONTINUED | OUTPATIENT
Start: 2025-04-25 | End: 2025-04-30 | Stop reason: SDUPTHER

## 2025-04-25 RX ORDER — LOSARTAN POTASSIUM 25 MG/1
25 TABLET ORAL DAILY
Status: DISCONTINUED | OUTPATIENT
Start: 2025-04-26 | End: 2025-05-05 | Stop reason: HOSPADM

## 2025-04-25 RX ORDER — SODIUM CHLORIDE 0.9 % (FLUSH) 0.9 %
10 SYRINGE (ML) INJECTION EVERY 12 HOURS SCHEDULED
Status: DISCONTINUED | OUTPATIENT
Start: 2025-04-26 | End: 2025-05-05 | Stop reason: HOSPADM

## 2025-04-25 RX ORDER — SODIUM CHLORIDE 9 MG/ML
INJECTION, SOLUTION INTRAVENOUS PRN
Status: DISCONTINUED | OUTPATIENT
Start: 2025-04-25 | End: 2025-05-05 | Stop reason: HOSPADM

## 2025-04-25 RX ORDER — PANTOPRAZOLE SODIUM 40 MG/1
40 TABLET, DELAYED RELEASE ORAL
Status: DISCONTINUED | OUTPATIENT
Start: 2025-04-26 | End: 2025-05-05 | Stop reason: HOSPADM

## 2025-04-25 RX ORDER — SENNOSIDES 8.6 MG/1
1 TABLET ORAL DAILY PRN
Status: DISCONTINUED | OUTPATIENT
Start: 2025-04-25 | End: 2025-05-05 | Stop reason: HOSPADM

## 2025-04-25 RX ORDER — INSULIN LISPRO 100 [IU]/ML
0-4 INJECTION, SOLUTION INTRAVENOUS; SUBCUTANEOUS
Status: DISCONTINUED | OUTPATIENT
Start: 2025-04-26 | End: 2025-05-05 | Stop reason: HOSPADM

## 2025-04-25 RX ORDER — ATORVASTATIN CALCIUM 20 MG/1
20 TABLET, FILM COATED ORAL NIGHTLY
Status: DISCONTINUED | OUTPATIENT
Start: 2025-04-26 | End: 2025-05-04

## 2025-04-25 RX ORDER — ACETAMINOPHEN 650 MG/1
650 SUPPOSITORY RECTAL EVERY 6 HOURS PRN
Status: DISCONTINUED | OUTPATIENT
Start: 2025-04-25 | End: 2025-04-30 | Stop reason: SDUPTHER

## 2025-04-25 RX ORDER — PHYTONADIONE 5 MG/1
5 TABLET ORAL ONCE
Status: COMPLETED | OUTPATIENT
Start: 2025-04-25 | End: 2025-04-25

## 2025-04-25 RX ORDER — ONDANSETRON 4 MG/1
4 TABLET, ORALLY DISINTEGRATING ORAL EVERY 8 HOURS PRN
Status: DISCONTINUED | OUTPATIENT
Start: 2025-04-25 | End: 2025-05-05 | Stop reason: HOSPADM

## 2025-04-25 RX ADMIN — SODIUM CHLORIDE, PRESERVATIVE FREE 10 ML: 5 INJECTION INTRAVENOUS at 21:00

## 2025-04-25 RX ADMIN — AMLODIPINE BESYLATE 5 MG: 5 TABLET ORAL at 17:48

## 2025-04-25 RX ADMIN — LOSARTAN POTASSIUM 25 MG: 25 TABLET, FILM COATED ORAL at 09:00

## 2025-04-25 RX ADMIN — PHYTONADIONE 5 MG: 5 TABLET ORAL at 07:45

## 2025-04-25 RX ADMIN — ASPIRIN 81 MG: 81 TABLET, COATED ORAL at 09:00

## 2025-04-25 RX ADMIN — ATORVASTATIN CALCIUM 20 MG: 20 TABLET, FILM COATED ORAL at 21:00

## 2025-04-25 RX ADMIN — SODIUM CHLORIDE, PRESERVATIVE FREE 10 ML: 5 INJECTION INTRAVENOUS at 09:00

## 2025-04-25 RX ADMIN — PANTOPRAZOLE SODIUM 40 MG: 40 TABLET, DELAYED RELEASE ORAL at 05:49

## 2025-04-25 NOTE — DISCHARGE SUMMARY
Internal Medicine Discharge Summary    NAME: Trace Nassar :  1940  MRN:  [id number] PCP:Silvino Garcia DO    ADMITTED: 2025   DISCHARGED: 2025 10:21 PM    ADMITTING PHYSICIAN: Pablo Bennett DO    PCP: Silvino Garcia DO    CONSULTANT(S):   IP CONSULT TO INTERNAL MEDICINE  IP CONSULT TO CARDIOLOGY  IP CONSULT TO HOSPITALIST  IP CONSULT TO CARDIOLOGY     ADMITTING DIAGNOSIS:   Syncope and collapse [R55]  Lightheadedness [R42]  Near syncope [R55]     Please see H&P for further details    DISCHARGE DIAGNOSES:   Active Hospital Problems    Diagnosis     Nonrheumatic aortic valve stenosis [I35.0]      Priority: Medium    Bradycardia [R00.1]     Syncope and collapse [R55]     Mitral insufficiency [I34.0]     Atrial fibrillation (HCC) [I48.91]     Hyperlipidemia with target LDL less than 100 [E78.5]     History of CVA (cerebrovascular accident) [Z86.73]     CAD (coronary artery disease), native coronary artery [I25.10]        BRIEF HISTORY OF PRESENT ILLNESS: Trace Nassar is a 85 y.o. male patient of Silvino Garcia DO who  has a past medical history of A-fib (HCC), A-fib (HCC), Arthritis, CAD (coronary artery disease), CHF (congestive heart failure) (HCC), COPD with acute exacerbation (HCC), Diastolic heart failure (HCC), Hyperlipidemia, Hypertension, and Unspecified cerebral artery occlusion with cerebral infarction. who originally had concerns including Dizziness (Pt was k and dizzy a [employer] and brought in for evaluation). at presentation on 2025, and was found to have Syncope and collapse [R55]  Lightheadedness [R42]  Near syncope [R55] after workup.    Please see H&P for further details.    HOSPITAL COURSE:   The patient presented to the hospital with the chief complaint of Dizziness (Pt was k and dizzy a [employer] and brought in for evaluation)  . The patient was admitted to the hospital.     Syncope and Collapse -- Likely Cardiac Etiology  CT head unremarkable  Orthostatic

## 2025-04-25 NOTE — PROGRESS NOTES
P Quality Flow/Interdisciplinary Rounds Progress Note        Quality Flow Rounds held on April 25, 2025    Disciplines Attending:  Bedside Nurse, , , and Nursing Unit Leadership    Trace B Patris was admitted on 4/22/2025  1:14 PM    Anticipated Discharge Date:  Expected Discharge Date: 04/25/25    Disposition:    Vidal Score:  Vidal Scale Score: 19    [facility] RISK OF UNPLANNED READMISSION 2.0             13 Total Score        Discussed patient goal for the day, patient clinical progression, and barriers to discharge.  The following Goal(s) of the Day/Commitment(s) have been identified:   iv fluids, cath today, monitor labs,       Tia Lyons RN  April 25, 2025

## 2025-04-25 NOTE — PROGRESS NOTES
INPATIENT CARDIOLOGY FOLLOW-UP    Name: Trace Nassar    Age: 85 y.o.    Date of Admission: 4/22/2025  1:14 PM    Date of Service: 4/25/2025    Primary Cardiologist: Dr Layne    Chief Complaint: Follow-up for syncope, bradycardia, aortic stenosis    Interim History:  Feels well denies chest pain shortness of breath syncope.    Remains in A-fib 40-50s on the monitor.    INR was 2.1 this morning but down to 1.9 just 2 hours after vitamin K.    Review of Systems:   Negative except as described above    Problem List:  Patient Active Problem List   Diagnosis    Atrial fibrillation (HCC)    Hyperlipidemia with target LDL less than 100    History of CVA (cerebrovascular accident)    CAD (coronary artery disease), native coronary artery    Mitral insufficiency    Carotid stenosis, asymptomatic, bilateral    Anemia    Nonrheumatic aortic valve stenosis    Heart failure with mid-range ejection fraction (HFmEF) (HCC)    Syncope and collapse    Bradycardia       Current Medications:    Current Facility-Administered Medications:     aspirin EC tablet 81 mg, 81 mg, Oral, Daily, Emory Arora MD, 81 mg at 04/25/25 0900    0.9 % sodium chloride infusion, , IntraVENous, Continuous, Emory Arora MD, Last Rate: 75 mL/hr at 04/24/25 2357, New Bag at 04/24/25 2357    sulfur hexafluoride microspheres (LUMASON) 60.7-25 MG injection 2 mL, 2 mL, IntraVENous, ONCE PRN, Emory Arora MD    glucose chewable tablet 16 g, 4 tablet, Oral, PRN, Pablo Bennett, DO    dextrose bolus 10% 125 mL, 125 mL, IntraVENous, PRN **OR** dextrose bolus 10% 250 mL, 250 mL, IntraVENous, PRN, Pablo Bennett, DO    glucagon injection 1 mg, 1 mg, SubCUTAneous, PRN, Pablo Bennett, DO    dextrose 10 % infusion, , IntraVENous, Continuous PRN, Pablo Bennett, DO    insulin lispro (HUMALOG,ADMELOG) injection vial 0-4 Units, 0-4 Units, SubCUTAneous, 4x Daily AC & HS, Pablo Bennett, DO    sodium chloride flush 0.9 % injection 10 mL, 10 mL,  \"LDLCALC\", \"LDLCHOLESTEROL\"  Lab Results   Component Value Date    VLDL 19 04/23/2025    VLDL 9 07/22/2014     No results found for: \"CHOLHDLRATIO\"  Recent Labs     04/22/25  1428   PROBNP 14,970*       Cardiac Tests:    EKG: Slow A-fib     Telemetry: A-fib 40-50s    Chest X-ray:     Impression:        1. No acute abnormality of the cervical spine.  2. Focal area of ground-glass opacity in the left lung apex.       Echocardiogram:   TTE 4/23/25    Left Ventricle: Mildly reduced left ventricular systolic function with a visually estimated EF of 40 - 45%. Left ventricle size is normal. Moderate basal septal thickening. Severe hypokinesis of the following segments: basal inferior, apical septal and apical inferior. Abnormal diastolic function.    Right Ventricle: Right ventricle is mildly dilated. Moderately reduced systolic function.    Aortic Valve: Classic low flow/low gradient severe aortic stenosis with low EF. AV mean gradient is 21 mmHg. AV peak velocity is 3.5 m/s. LVOT:AV VTI Index is 0.21. AV area by continuity VTI is 0.8 cm2. AV Stroke Volume index is 33.2 mL/m2.    Mitral Valve: Moderate regurgitation.    Tricuspid Valve: Moderate regurgitation.    Left Atrium: Left atrium is dilated.    Image quality is suboptimal. Contrast used: Lumason. Technically difficult study with poor endocardial visualization and technically difficult study due to patient's body habitus.    Atrial fibrillation with slow ventricular response throughout study.    Stress test:      Cardiac catheterization:   [facility]/PCI 2014 [facility]  CONCLUSIONS:   1.    Coronary artery disease.         a.    Left main: Heavily calcified but with no significant angiographic      luminal narrowing.         b.    LAD: Heavily calcified proximal and mid vessel with a filling      defect near the ostium  of the vessel, an eccentric 30% heavily calcified      proximal vessel stenosis, diffuse  in-stent restenosis in the mid vessel      with up to 70% to 80%

## 2025-04-25 NOTE — PROGRESS NOTES
Internal Medicine Progress Note    Patient's name: Trace Nassar  : 1940  Chief complaints (on day of admission): Dizziness (Pt was k and dizzy a [employer] and brought in for evaluation)  Admission date: 2025  Date of service: 2025   Room: [address] INTERMEDIATE  Primary care physician: Silvino Garcia DO  Reason for visit: Follow-up for syncope and collapse    Subjective  Trace is seen lying in bed asleep, in no distress. He does easily awaken to voice. He reports feeling well this morning. Denies any pain or discomfort. Denies any nausea or vomiting. INR at 2.1 this AM -- he is to receive oral Vitamin K x 1. [place] [facility] [place]. No other issues or concerns from nursing.    Review of Systems  Full 10 point review of systems negative unless mentioned above.    Hospital Medications  Current Facility-Administered Medications   Medication Dose Route Frequency Provider Last Rate Last Admin    phytonadione (VITAMIN K) tablet 5 mg  5 mg Oral Once Emory Arora MD        aspirin EC tablet 81 mg  81 mg Oral Daily Emory Arora MD   81 mg at 25 1724    0.9 % sodium chloride infusion   IntraVENous Continuous Emory Arora MD 75 mL/hr at 25 2357 New Bag at 25 2357    sulfur hexafluoride microspheres (LUMASON) 60.7-25 MG injection 2 mL  2 mL IntraVENous ONCE PRN Emory Arora MD        glucose chewable tablet 16 g  4 tablet Oral PRN Pablo Bennett, DO        dextrose bolus 10% 125 mL  125 mL IntraVENous PRN Pablo Bennett, DO        Or    dextrose bolus 10% 250 mL  250 mL IntraVENous PRN Pablo Bennett, DO        glucagon injection 1 mg  1 mg SubCUTAneous PRN Pablo Bennett, DO        dextrose 10 % infusion   IntraVENous Continuous PRN Pablo Bennett, DO        insulin lispro (HUMALOG,ADMELOG) injection vial 0-4 Units  0-4 Units SubCUTAneous 4x Daily AC & HS Pablo Bennett, DO        sodium chloride

## 2025-04-25 NOTE — PLAN OF CARE
For reasons that are not clear to me interventional cardiology apparently refusing to perform a radial catheterization with INR of 1.9 (which will be 1.7 by the time he would arrive at cath lab) and he has been rescheduled till Monday.    Recommend transfer patient downtown today so can be seen by EP and CTS/structural.    Discussed with Dr. Bennett.    Tico Arora MD

## 2025-04-25 NOTE — PROGRESS NOTES
Received phone call from cath lab stating cath can't be done today with an INR at 1.9, and will be moved to Monday. FRANCISCA Chandra notified.

## 2025-04-25 NOTE — PLAN OF CARE
Problem: ABCDS Injury Assessment  Goal: Absence of physical injury  4/25/2025 1222 by Malik Delarosa RN  Outcome: Progressing  4/25/2025 0133 by Jay Cm RN  Outcome: Progressing     Problem: Discharge Planning  Goal: Discharge to home or other facility with appropriate resources  4/25/2025 1222 by Malik Delarosa RN  Outcome: Progressing  4/25/2025 0133 by Jay Cm RN  Outcome: Progressing     Problem: Chronic Conditions and Co-morbidities  Goal: Patient's chronic conditions and co-morbidity symptoms are monitored and maintained or improved  4/25/2025 1222 by Malik Delarosa RN  Outcome: Progressing  4/25/2025 0133 by Jay Cm RN  Outcome: Progressing     Problem: Safety - Adult  Goal: Free from fall injury  4/25/2025 1222 by Malik Delarosa RN  Outcome: Progressing  4/25/2025 0133 by Jay Cm RN  Outcome: Progressing

## 2025-04-26 LAB
ALBUMIN SERPL-MCNC: 3.4 G/DL (ref 3.5–5.2)
ALP SERPL-CCNC: 62 U/L (ref 40–129)
ALT SERPL-CCNC: 16 U/L (ref 0–50)
ANION GAP SERPL CALCULATED.3IONS-SCNC: 12 MMOL/L (ref 7–16)
AST SERPL-CCNC: 25 U/L (ref 0–50)
BASOPHILS # BLD: 0.06 K/UL (ref 0–0.2)
BASOPHILS NFR BLD: 1 % (ref 0–2)
BILIRUB SERPL-MCNC: 1.6 MG/DL (ref 0–1.2)
BUN SERPL-MCNC: 16 MG/DL (ref 8–23)
CALCIUM SERPL-MCNC: 9.4 MG/DL (ref 8.8–10.2)
CHLORIDE SERPL-SCNC: 102 MMOL/L (ref 98–107)
CO2 SERPL-SCNC: 21 MMOL/L (ref 22–29)
CREAT SERPL-MCNC: 1.1 MG/DL (ref 0.7–1.2)
EOSINOPHIL # BLD: 0.14 K/UL (ref 0.05–0.5)
EOSINOPHILS RELATIVE PERCENT: 3 % (ref 0–6)
ERYTHROCYTE [DISTWIDTH] IN BLOOD BY AUTOMATED COUNT: 15.8 % (ref 11.5–15)
GFR, ESTIMATED: 69 ML/MIN/1.73M2
GLUCOSE BLD-MCNC: 100 MG/DL (ref 74–99)
GLUCOSE BLD-MCNC: 126 MG/DL (ref 74–99)
GLUCOSE BLD-MCNC: 96 MG/DL (ref 74–99)
GLUCOSE BLD-MCNC: 99 MG/DL (ref 74–99)
GLUCOSE SERPL-MCNC: 102 MG/DL (ref 74–99)
HCT VFR BLD AUTO: 40.6 % (ref 37–54)
HGB BLD-MCNC: 13.1 G/DL (ref 12.5–16.5)
IMM GRANULOCYTES # BLD AUTO: <0.03 K/UL (ref 0–0.58)
IMM GRANULOCYTES NFR BLD: 0 % (ref 0–5)
INR PPP: 1.5
LYMPHOCYTES NFR BLD: 1.42 K/UL (ref 1.5–4)
LYMPHOCYTES RELATIVE PERCENT: 26 % (ref 20–42)
MCH RBC QN AUTO: 29.2 PG (ref 26–35)
MCHC RBC AUTO-ENTMCNC: 32.3 G/DL (ref 32–34.5)
MCV RBC AUTO: 90.6 FL (ref 80–99.9)
MONOCYTES NFR BLD: 0.61 K/UL (ref 0.1–0.95)
MONOCYTES NFR BLD: 11 % (ref 2–12)
NEUTROPHILS NFR BLD: 59 % (ref 43–80)
NEUTS SEG NFR BLD: 3.2 K/UL (ref 1.8–7.3)
PLATELET # BLD AUTO: 177 K/UL (ref 130–450)
PMV BLD AUTO: 11.4 FL (ref 7–12)
POTASSIUM SERPL-SCNC: 4 MMOL/L (ref 3.5–5.1)
PROT SERPL-MCNC: 7.3 G/DL (ref 6.4–8.3)
PROTHROMBIN TIME: 16.9 SEC (ref 9.3–12.4)
RBC # BLD AUTO: 4.48 M/UL (ref 3.8–5.8)
SODIUM SERPL-SCNC: 136 MMOL/L (ref 136–145)
WBC OTHER # BLD: 5.4 K/UL (ref 4.5–11.5)

## 2025-04-26 PROCEDURE — 99223 1ST HOSP IP/OBS HIGH 75: CPT | Performed by: INTERNAL MEDICINE

## 2025-04-26 PROCEDURE — 6360000002 HC RX W HCPCS: Performed by: INTERNAL MEDICINE

## 2025-04-26 PROCEDURE — 2140000000 HC CCU INTERMEDIATE R&B

## 2025-04-26 PROCEDURE — 2500000003 HC RX 250 WO HCPCS: Performed by: NURSE PRACTITIONER

## 2025-04-26 PROCEDURE — 99222 1ST HOSP IP/OBS MODERATE 55: CPT | Performed by: THORACIC SURGERY (CARDIOTHORACIC VASCULAR SURGERY)

## 2025-04-26 PROCEDURE — 6370000000 HC RX 637 (ALT 250 FOR IP): Performed by: NURSE PRACTITIONER

## 2025-04-26 PROCEDURE — 36415 COLL VENOUS BLD VENIPUNCTURE: CPT

## 2025-04-26 PROCEDURE — 99233 SBSQ HOSP IP/OBS HIGH 50: CPT | Performed by: INTERNAL MEDICINE

## 2025-04-26 PROCEDURE — 85025 COMPLETE CBC W/AUTO DIFF WBC: CPT

## 2025-04-26 PROCEDURE — 82962 GLUCOSE BLOOD TEST: CPT

## 2025-04-26 PROCEDURE — 80053 COMPREHEN METABOLIC PANEL: CPT

## 2025-04-26 PROCEDURE — 85610 PROTHROMBIN TIME: CPT

## 2025-04-26 RX ORDER — ENOXAPARIN SODIUM 100 MG/ML
1 INJECTION SUBCUTANEOUS 2 TIMES DAILY
Status: DISCONTINUED | OUTPATIENT
Start: 2025-04-26 | End: 2025-04-28

## 2025-04-26 RX ADMIN — ATORVASTATIN CALCIUM 20 MG: 20 TABLET, FILM COATED ORAL at 20:00

## 2025-04-26 RX ADMIN — LOSARTAN POTASSIUM 25 MG: 25 TABLET, FILM COATED ORAL at 08:00

## 2025-04-26 RX ADMIN — SODIUM CHLORIDE, PRESERVATIVE FREE 10 ML: 5 INJECTION INTRAVENOUS at 20:01

## 2025-04-26 RX ADMIN — SODIUM CHLORIDE, PRESERVATIVE FREE 10 ML: 5 INJECTION INTRAVENOUS at 08:00

## 2025-04-26 RX ADMIN — PANTOPRAZOLE SODIUM 40 MG: 40 TABLET, DELAYED RELEASE ORAL at 05:58

## 2025-04-26 RX ADMIN — ENOXAPARIN SODIUM 60 MG: 100 INJECTION SUBCUTANEOUS at 20:00

## 2025-04-26 NOTE — CONSULTS
PLACEMENT  2014    EYE SURGERY      CATARACT BILATERALLY    HERNIA REPAIR         Social History:  Social History     Socioeconomic History    Marital status:      Spouse name: Not on file    Number of children: Not on file    Years of education: Not on file    Highest education level: Not on file   Occupational History    Not on file   Tobacco Use    Smoking status: Former     Current packs/day: 0.00     Average packs/day: 0.3 packs/day for 45.5 years (11.4 ttl pk-yrs)     Types: Cigarettes     Start date: 1955     Quit date: 2001     Years since quittin.2    Smokeless tobacco: Never   Vaping Use    Vaping status: Never Used   Substance and Sexual Activity    Alcohol use: Yes     Comment: glass of wine occasionally    Drug use: No    Sexual activity: Never   Other Topics Concern    Not on file   Social History Narrative    ** Merged History Encounter **          Social Drivers of Health     Financial Resource Strain: Not on file   Food Insecurity: No Food Insecurity (2025)    Hunger Vital Sign     Worried About Running Out of Food in the Last Year: Never true     Ran Out of Food in the Last Year: Never true   Transportation Needs: No Transportation Needs (2025)    PRAPARE - Transportation     Lack of Transportation (Medical): No     Lack of Transportation (Non-Medical): No   Physical Activity: Not on file   Stress: Not on file   Social Connections: Not on file   Intimate Partner Violence: Not on file   Housing Stability: Low Risk  (2025)    Housing Stability Vital Sign     Unable to Pay for Housing in the Last Year: No     Number of Times Moved in the Last Year: 0     Homeless in the Last Year: No       Family History:  Family History   Problem Relation Age of Onset    Heart Disease Father     Cancer Sister     Heart Disease Sister     Heart Disease Brother     Heart Disease Sister     Heart Disease Brother     Heart Disease Brother        Review of  Systems:  Constitutional: Denies fevers, chills, or weight loss.  HEENT: Denies visual changes or hearing loss.   Heart: As per HPI.   Lungs: Denies shortness of breath, cough, or wheezing.   Gastrointestinal: Denies nausea, vomiting, constipation, or diarrhea.   Genitourinary: Denies dysuria or hematuria.  Psychiatric: Patient denies anxiety or depression.   Neurologic: Patient denies weakness of the extremities, dizziness, or headaches.  All other ROS checked and found to be negative.    Labs:  Recent Labs     04/24/25  0330 04/25/25  0400 04/26/25  0606   WBC 5.1 6.0 5.4   HGB 11.6* 11.6* 13.1   HCT 36.4* 36.0* 40.6    157 177      Recent Labs     04/24/25  0330 04/25/25  0400 04/26/25  0606   BUN 26* 22 16   CREATININE 1.4* 1.2 1.1       Objective:  Vitals BP (!) 159/96   Pulse 58   Temp 97.6 °F (36.4 °C) (Temporal)   Resp 18   Ht 1.6 m (5' 3\")   Wt 64.4 kg (142 lb)   SpO2 98%   BMI 25.15 kg/m²   General Appearance: Pleasant 85 y.o. year old male who appears stated age.  Communicates well, no acute distress.    HEENT: Head is normocephalic, atraumatic.  EOMs intact, PERRL.  Trachea midline.   Lungs: Normal respiratory rate and normal effort. He is not in respiratory distress. Breath sounds clear to auscultation. No wheezes.   Heart: Normal rate. Regular rhythm. S1 normal and S2 normal. Positive for murmur.   Chest: Symmetric chest wall expansion.   Extremities: Normal range of motion.     Neurological: Patient is alert and oriented to person, place and time. Patient has normal reflexes.   Skin: Warm and dry.   Abdomen: Abdomen is soft and non-distended. Bowel sounds are normal. There is no abdominal tenderness tenderness. There is no guarding. There is no mass.   Pulses: Distal pulses are intact.  Skin: Warm and dry without lesions.        TTE:    Left Ventricle: Mildly reduced left ventricular systolic function with a visually estimated EF of 40 - 45%. Left ventricle size is normal. Moderate basal

## 2025-04-26 NOTE — PROGRESS NOTES
4 Eyes Skin Assessment     NAME:  Trace Nassar  YOB: 1940  MEDICAL RECORD NUMBER:  [id number]    The patient is being assessed for  Admission    I agree that at least one RN has performed a thorough Head to Toe Skin Assessment on the patient. ALL assessment sites listed below have been assessed.      Areas assessed by both nurses:    Head, Face, Ears, Shoulders, Back, Chest, Arms, Elbows, Hands, Sacrum. Buttock, Coccyx, Ischium, Legs. Feet and Heels, and Under Medical Devices     No open wounds. Scab noted on lower R leg. Blanchable redness noted around coccyx area.        Does the Patient have a Wound? No noted wound(s)       Vidal Prevention initiated by RN: No  Wound Care Orders initiated by RN: No    Pressure Injury (Stage 3,4, Unstageable, DTI, NWPT, and Complex wounds) if present, place Wound referral order by RN under : No    New Ostomies, if present place, Ostomy referral order under : No     Nurse 1 eSignature: Electronically signed by Quan Eduardo RN on 4/26/25 at 12:04 AM EDT    **SHARE this note so that the co-signing nurse can place an eSignature**    Nurse 2 eSignature: Electronically signed by Caroline Hurt RN on 4/26/25 at 3:30 AM EDT

## 2025-04-26 NOTE — PROGRESS NOTES
Called Shira regarding missing jacket and keys - nurse Mat stated he did find it and will keep it at the nurse's station until family can pick it up.    Received call shortly after stating that it was another patient's so jacket is currently missing.    Quan Eduardo RN

## 2025-04-26 NOTE — CONSULTS
Please see cardiology consult dated 4/23/2025 by Dr Arora  Electronically signed by JOHNIE ORTIZ on 4/26/2025 at 6:40 AM

## 2025-04-26 NOTE — PROGRESS NOTES
INPATIENT CARDIOLOGY FOLLOW-UP    Name: Trace Nassar    Age: 85 y.o.    Date of Admission: 4/25/2025 10:56 PM    Date of Service: 4/26/2025    Primary Cardiologist: Dr Layne    Chief Complaint: Follow-up for syncope, bradycardia, aortic stenosis    Interim History:  Transferred from [place] to [facility] for severe aortic stenosis evaluation.  Patient seen by cardiothoracic surgery and recommending TAVR as opposed to SAVR given his age and comorbidities.    Review of Systems:   Negative except as described above    Problem List:  Patient Active Problem List   Diagnosis    Atrial fibrillation (HCC)    Hyperlipidemia with target LDL less than 100    History of CVA (cerebrovascular accident)    CAD (coronary artery disease), native coronary artery    Mitral insufficiency    Carotid stenosis, asymptomatic, bilateral    Anemia    Nonrheumatic aortic valve stenosis    Heart failure with mid-range ejection fraction (HFmEF) (HCC)    Syncope and collapse    Bradycardia    Severe aortic stenosis       Current Medications:    Current Facility-Administered Medications:     sodium chloride flush 0.9 % injection 10 mL, 10 mL, IntraVENous, 2 times per day, Nikki Cardoso APRN - CNP, 10 mL at 04/26/25 0800    sodium chloride flush 0.9 % injection 10 mL, 10 mL, IntraVENous, PRN, Nikki Cardoso APRN - CNP    0.9 % sodium chloride infusion, , IntraVENous, PRN, Nikki Cardoso APRN - CNP    ondansetron (ZOFRAN-ODT) disintegrating tablet 4 mg, 4 mg, Oral, Q8H PRN **OR** ondansetron (ZOFRAN) injection 4 mg, 4 mg, IntraVENous, Q6H PRN, Nikki Cardoso APRN - CNP    senna (SENOKOT) tablet 8.6 mg, 1 tablet, Oral, Daily PRN, Nikki Cardoso APRN - CNP    acetaminophen (TYLENOL) tablet 650 mg, 650 mg, Oral, Q6H PRN **OR** acetaminophen (TYLENOL) suppository 650 mg, 650 mg, Rectal, Q6H PRN, Nikki Cardoso APRN - CNP    melatonin tablet 6 mg, 6 mg, Oral, Nightly PRN, Nikki Cardoso APRN - CNP           Dung Looney MD  [facility]    NOTE: This report was transcribed using voice recognition software. Every effort was made to ensure accuracy; however, inadvertent computerized transcription errors may be present.

## 2025-04-26 NOTE — CONSULTS
[facility]- [facility] [place]iology  Consultation Report  PATIENT: Trace Nassar  MEDICAL RECORD NUMBER: [id number]  DATE OF SERVICE:  4/26/2025  ATTENDING ELECTROPHYSIOLOGIST: Rosa Ash MD  PRIMARY ELECTROPHYSIOLOGIST: Rosa Ash MD  REFERRING PHYSICIAN: Pablo Bennett DO and Silvino Garcia DO  CHIEF COMPLAINT: Syncope    HPI: This is a 85 y.o. male with a history of coronary artery disease with PCI of the LAD in 2001, inferior wall STEMI in 2014-stent to dominant circumflex, 75% LAD stent obstruction, hyperlipidemia, permanent atrial fibrillation possibly dating back to 2014, GARRY guided cardioversion 2014, subsequent rate control only, mitral regurgitation, carotid disease and possible CVA, aortic stenosis  He is followed by Dr. Layne and has been on Coumadin, metoprolol 50 mg daily, Lasix 20 mg daily, losartan 25 mg daily, aspirin, Lipitor, Protonix and vitamin D.  The patient was hospitalized in 2022 with symptoms of persistent cough which was felt to be secondary to bronchitis.  He did not have symptoms of congestive heart failure but his INR was subtherapeutic and his dose of Coumadin was reduced.  Patient presents to the hospital now with an episode of sudden syncope.  Patient denies any prior hospitals of syncope or near syncope.  He had his usual morning breakfast and his morning pills on the day of this event.  He walked into [employer] and was doing his usual shopping when all of a sudden he fell.  He does not recall any premonitory symptoms but feels that he collapsed suddenly.  EMS was called and the patient was brought to the hospital.  The patient had no symptoms prior to or after the episode.  He has been seen by cardiology and is scheduled for coronary evaluation since his echocardiography revealed significant aortic stenosis.   Cardiac electrophysiology service is consulted for possible bradycardia related syncope on  documented bradycardia    6.  Carotid artery disease  History of CVA 2014  Moderate bilateral carotid plaque as noted on workup in the past    7.  Hypertension, hyperlipidemia  On treatment per PMD    8.  COPD--stable    9.  CKD--serum creatinine on presentation was 1.4 but currently 1.1      Recommendations:    Agree with discontinuation of beta-blockade at the present time  Coronary evaluation and aortic valve intervention  Consideration for permanent pacemaker in the future based on his response to all of the above    All of the above was discussed with the patient reviewing the above stated recommendations.  And a total of >50% of that time involved face-to-face time providing counseling and or coordination of care with the other providers, reviewing records/tests, counseling/education of the patient, ordering medications/tests/procedures, coordinating care, and documenting clinical information in the EHR.     Thank you for allowing me to participate in your patient's care.  Please call me if there are any questions or concerns.      Rosa Ash MD  Cardiac Electrophysiology  [facility]  [facility] [place]: [facility]  3:48 PM  4/26/2025

## 2025-04-26 NOTE — PLAN OF CARE
Problem: Chronic Conditions and Co-morbidities  Goal: Patient's chronic conditions and co-morbidity symptoms are monitored and maintained or improved  4/26/2025 0807 by Nikia Kyle RN  Outcome: Progressing     Problem: Discharge Planning  Goal: Discharge to home or other facility with appropriate resources  4/26/2025 0807 by Nikia Kyle RN  Outcome: Progressing     Problem: Safety - Adult  Goal: Free from fall injury  4/26/2025 0807 by Nikia Kyle RN  Outcome: Progressing     Problem: ABCDS Injury Assessment  Goal: Absence of physical injury  4/26/2025 0807 by Nikia Kyle RN  Outcome: Progressing

## 2025-04-26 NOTE — PROGRESS NOTES
Physicians ambulance arrived at 2218. Telemetry monitor removed. All questions answered. Notified 6500 in [place] of the patient's departure.

## 2025-04-26 NOTE — H&P
[facility]              [address] [place] [address]                           HISTORY & PHYSICAL      PATIENT NAME: ALYSHA JOSUE        : 1940  MED REC NO: [id number]                        ROOM: [facility]  ACCOUNT NO: [id number]                       ADMIT DATE: 2025  PROVIDER: Steven Nava DO      REFERRING PHYSICIAN:  SHAMIR SAAB    PRIMARY CARE PHYSICIAN:  Dr. Garcia    CHIEF COMPLAINT:  Aortic stenosis.    HISTORY OF PRESENT ILLNESS:  The patient is an 85-year-old  male who was originally admitted at [facility] after a syncopal episode at [facility].  Diagnostic evaluation revealed severe aortic stenosis.  He was transferred to [facility] for cardiothoracic surgery evaluation.  Also, Cardiology plans heart catheterization.  The patient with bradycardia and EP eval was recommended.    PAST MEDICAL HISTORY:  Atrial fibrillation, chronic Coumadin therapy, osteoarthritis, coronary artery disease, COPD, chronic diastolic congestive heart failure, hyperlipidemia, hypertension, and CVA.    PAST SURGICAL HISTORY:  Abdominal perforated bowel surgery, appendectomy, cardioversion, left carotid endarterectomy, coronary angioplasty with stent placement, bilateral eye cataract surgery, and hernia repair.    SOCIAL HISTORY:  The patient quit tobacco.  Occasional alcohol.    REVIEW OF SYSTEMS:  Remarkable for above-stated chief complaint.    ALLERGIES:  TO PENICILLIN AND SULFA.      MEDICATIONS PRIOR TO ADMISSION:  Aspirin, Lipitor, Lasix, Cozaar, metoprolol, Protonix, potassium chloride, vitamin D, and warfarin.    PHYSICAL EXAMINATION:  GENERAL APPEARANCE:  Reveals an 85-year-old  male who is alert, cooperative, and a fair historian.  He speaks broken English.  VITAL SIGNS:  Temperature 97 degrees, pulse 68, respirations 18, and blood pressure 167/88.  HEAD:  Normocephalic and atraumatic.  EYES:   Pupils equal and reactive to light.  Extraocular muscles intact.  Fundi not well visualized.  NOSE:  No obstruction, polyp, or discharge noted.  MOUTH:  Mucosa without lesion.  PHARYNX:  Noninjected without exudate.  NECK:  Supple.  No JVD.  No thyromegaly.  No carotid bruits.  HEART:  Irregularly irregular, bradycardic with grade 2/6 systolic murmur.  LUNGS:  Clear to auscultation bilaterally.  ABDOMEN:  Positive bowel sounds.  Soft and nontender.  No rebound.  No guarding.  No hepatosplenomegaly.  No masses.  BACK:  With increased thoracic kyphosis.  EXTREMITIES:  Without edema.  LYMPH NODES:  No adenopathy noted.  SKIN:  Without rash or lesion.    IMPRESSION:    1. Severe aortic stenosis.  2. Atrial fibrillation with slow ventricular response.  3. Coronary artery disease.  4. Chronic obstructive pulmonary disease.  5. Hyperlipidemia.  6. Hypertension.  7. Chronic anticoagulation.    PLAN:    1. Admit.  2. Warfarin on hold for anticipated heart catheterization.  3. Cardiology, Cardiothoracic Surgery, and EP to see.  4. Continue cardiac telemetry.  5. Discharge plan, home when stable.          SUAD HAY DO      D:  04/26/2025 09:36:32     T:  04/26/2025 10:45:54     KODY/CHELSEA  Job #:  [id number]     Doc#:  [id number]

## 2025-04-26 NOTE — FLOWSHEET NOTE
Patient arrived on floor with the followin/25/25 2321   Belongings   Dental Appliances Uppers;Lowers   Vision - Corrective Lenses Eyeglasses;At home   Hearing Aid None   Clothing Footwear;Pajamas;Pants;Shirt;Undergarments;Socks;At bedside   Jewelry Necklace;Watch   Body Piercings Removed N/A   Electronic Devices Cell Phone;;At bedside   Weapons (Notify Protective Services/Security) None   Other Valuables Wallet;Money;Money Clip;At bedside   Home Medications None   Valuables Given To Patient   Provide Name(s) of Who Valuable(s) Were Given To Trace Nassar     Eyeglasses left at home and patient states that he had a jacket with keys in the pocket when at [place] - did not find with valuables. Asked if son may have taken and he stated its possible but he did not think so. Provided patient with water, requested he call for assistance to use the restroom, and showed him how to use the call light.     Quan Eduardo RN

## 2025-04-27 LAB
ALBUMIN SERPL-MCNC: 3.1 G/DL (ref 3.5–5.2)
ALP SERPL-CCNC: 61 U/L (ref 40–129)
ALT SERPL-CCNC: 11 U/L (ref 0–50)
ANION GAP SERPL CALCULATED.3IONS-SCNC: 11 MMOL/L (ref 7–16)
AST SERPL-CCNC: 25 U/L (ref 0–50)
BASOPHILS # BLD: 0.05 K/UL (ref 0–0.2)
BASOPHILS NFR BLD: 1 % (ref 0–2)
BILIRUB SERPL-MCNC: 1 MG/DL (ref 0–1.2)
BUN SERPL-MCNC: 18 MG/DL (ref 8–23)
CALCIUM SERPL-MCNC: 9.3 MG/DL (ref 8.8–10.2)
CHLORIDE SERPL-SCNC: 107 MMOL/L (ref 98–107)
CO2 SERPL-SCNC: 21 MMOL/L (ref 22–29)
CREAT SERPL-MCNC: 1.1 MG/DL (ref 0.7–1.2)
EOSINOPHIL # BLD: 0.16 K/UL (ref 0.05–0.5)
EOSINOPHILS RELATIVE PERCENT: 3 % (ref 0–6)
ERYTHROCYTE [DISTWIDTH] IN BLOOD BY AUTOMATED COUNT: 15.9 % (ref 11.5–15)
GFR, ESTIMATED: 66 ML/MIN/1.73M2
GLUCOSE BLD-MCNC: 101 MG/DL (ref 74–99)
GLUCOSE BLD-MCNC: 116 MG/DL (ref 74–99)
GLUCOSE BLD-MCNC: 122 MG/DL (ref 74–99)
GLUCOSE BLD-MCNC: 130 MG/DL (ref 74–99)
GLUCOSE SERPL-MCNC: 114 MG/DL (ref 74–99)
HCT VFR BLD AUTO: 38.8 % (ref 37–54)
HGB BLD-MCNC: 12.5 G/DL (ref 12.5–16.5)
IMM GRANULOCYTES # BLD AUTO: 0.03 K/UL (ref 0–0.58)
IMM GRANULOCYTES NFR BLD: 1 % (ref 0–5)
INR PPP: 1.5
LYMPHOCYTES NFR BLD: 1.15 K/UL (ref 1.5–4)
LYMPHOCYTES RELATIVE PERCENT: 23 % (ref 20–42)
MCH RBC QN AUTO: 29.1 PG (ref 26–35)
MCHC RBC AUTO-ENTMCNC: 32.2 G/DL (ref 32–34.5)
MCV RBC AUTO: 90.4 FL (ref 80–99.9)
MONOCYTES NFR BLD: 0.68 K/UL (ref 0.1–0.95)
MONOCYTES NFR BLD: 14 % (ref 2–12)
NEUTROPHILS NFR BLD: 59 % (ref 43–80)
NEUTS SEG NFR BLD: 2.97 K/UL (ref 1.8–7.3)
PLATELET # BLD AUTO: 148 K/UL (ref 130–450)
PMV BLD AUTO: 11 FL (ref 7–12)
POTASSIUM SERPL-SCNC: 4.2 MMOL/L (ref 3.5–5.1)
PROT SERPL-MCNC: 6.8 G/DL (ref 6.4–8.3)
PROTHROMBIN TIME: 16.3 SEC (ref 9.3–12.4)
RBC # BLD AUTO: 4.29 M/UL (ref 3.8–5.8)
SODIUM SERPL-SCNC: 138 MMOL/L (ref 136–145)
WBC OTHER # BLD: 5 K/UL (ref 4.5–11.5)

## 2025-04-27 PROCEDURE — 82962 GLUCOSE BLOOD TEST: CPT

## 2025-04-27 PROCEDURE — 6370000000 HC RX 637 (ALT 250 FOR IP): Performed by: INTERNAL MEDICINE

## 2025-04-27 PROCEDURE — 80053 COMPREHEN METABOLIC PANEL: CPT

## 2025-04-27 PROCEDURE — 6370000000 HC RX 637 (ALT 250 FOR IP): Performed by: NURSE PRACTITIONER

## 2025-04-27 PROCEDURE — 85025 COMPLETE CBC W/AUTO DIFF WBC: CPT

## 2025-04-27 PROCEDURE — 2140000000 HC CCU INTERMEDIATE R&B

## 2025-04-27 PROCEDURE — 99232 SBSQ HOSP IP/OBS MODERATE 35: CPT | Performed by: INTERNAL MEDICINE

## 2025-04-27 PROCEDURE — 85610 PROTHROMBIN TIME: CPT

## 2025-04-27 PROCEDURE — 99233 SBSQ HOSP IP/OBS HIGH 50: CPT | Performed by: INTERNAL MEDICINE

## 2025-04-27 PROCEDURE — 2500000003 HC RX 250 WO HCPCS: Performed by: NURSE PRACTITIONER

## 2025-04-27 PROCEDURE — 6360000002 HC RX W HCPCS: Performed by: INTERNAL MEDICINE

## 2025-04-27 PROCEDURE — 36415 COLL VENOUS BLD VENIPUNCTURE: CPT

## 2025-04-27 RX ORDER — GUAIFENESIN 400 MG/1
400 TABLET ORAL 2 TIMES DAILY PRN
Status: DISCONTINUED | OUTPATIENT
Start: 2025-04-27 | End: 2025-05-05 | Stop reason: HOSPADM

## 2025-04-27 RX ORDER — GUAIFENESIN 400 MG/1
400 TABLET ORAL 2 TIMES DAILY PRN
Status: ON HOLD | COMMUNITY
End: 2025-06-14 | Stop reason: HOSPADM

## 2025-04-27 RX ADMIN — SODIUM CHLORIDE, PRESERVATIVE FREE 10 ML: 5 INJECTION INTRAVENOUS at 19:36

## 2025-04-27 RX ADMIN — ENOXAPARIN SODIUM 60 MG: 100 INJECTION SUBCUTANEOUS at 19:36

## 2025-04-27 RX ADMIN — ENOXAPARIN SODIUM 60 MG: 100 INJECTION SUBCUTANEOUS at 08:17

## 2025-04-27 RX ADMIN — LOSARTAN POTASSIUM 25 MG: 25 TABLET, FILM COATED ORAL at 08:17

## 2025-04-27 RX ADMIN — PANTOPRAZOLE SODIUM 40 MG: 40 TABLET, DELAYED RELEASE ORAL at 05:20

## 2025-04-27 RX ADMIN — GUAIFENESIN 400 MG: 400 TABLET ORAL at 20:59

## 2025-04-27 RX ADMIN — SODIUM CHLORIDE, PRESERVATIVE FREE 10 ML: 5 INJECTION INTRAVENOUS at 08:19

## 2025-04-27 RX ADMIN — ATORVASTATIN CALCIUM 20 MG: 20 TABLET, FILM COATED ORAL at 19:36

## 2025-04-27 NOTE — PROGRESS NOTES
Messaged cardiology regarding heparin bridging if there is a need prior to heart cath.  Heart Cath is tomorrow in AM   Okay with lovenox prior  Nikia Kyle RN

## 2025-04-27 NOTE — PROGRESS NOTES
INPATIENT CARDIOLOGY FOLLOW-UP    Name: Trace Nassar    Age: 85 y.o.    Date of Admission: 4/25/2025 10:56 PM    Date of Service: 4/27/2025    Primary Cardiologist: Dr Layne    Chief Complaint: Follow-up for syncope, bradycardia, aortic stenosis    Interim History:  Transferred from [place] to [facility] for severe aortic stenosis evaluation.  Patient seen by cardiothoracic surgery and recommending TAVR as opposed to SAVR given his age and comorbidities.  Currently denies chest pain or shortness of breath  Awaiting cardiac workup tomorrow  N.p.o. after midnight.      Review of Systems:   Negative except as described above    Problem List:  Patient Active Problem List   Diagnosis    Atrial fibrillation with slow ventricular response (HCC)    Hyperlipidemia with target LDL less than 100    History of CVA (cerebrovascular accident)    CAD (coronary artery disease), native coronary artery    Mitral insufficiency    Carotid stenosis, asymptomatic, bilateral    Anemia    Nonrheumatic aortic valve stenosis    Heart failure with mid-range ejection fraction (HFmEF) (HCC)    Syncope and collapse    Bradycardia    Severe aortic stenosis       Current Medications:    Current Facility-Administered Medications:     enoxaparin (LOVENOX) injection 60 mg, 1 mg/kg, SubCUTAneous, BID, Steven Nava DO, 60 mg at 04/27/25 0817    sodium chloride flush 0.9 % injection 10 mL, 10 mL, IntraVENous, 2 times per day, Nikki Cardoso APRN - CNP, 10 mL at 04/27/25 0819    sodium chloride flush 0.9 % injection 10 mL, 10 mL, IntraVENous, PRN, Nikki Cardoso APRN - CNP    0.9 % sodium chloride infusion, , IntraVENous, PRN, Nikki Cardoso APRN - CNP    ondansetron (ZOFRAN-ODT) disintegrating tablet 4 mg, 4 mg, Oral, Q8H PRN **OR** ondansetron (ZOFRAN) injection 4 mg, 4 mg, IntraVENous, Q6H PRN, Nikki Cardoso APRN - CNP    senna (SENOKOT) tablet 8.6 mg, 1 tablet, Oral, Daily PRN, Nikki Cardoso APRN - RUDY    closely on telemetry  Monitor electrolyte and keep potassium at 4 magnesium at 2  N.p.o. after midnight for possible structural heart evaluation tomorrow    Greater than 50 minutes was spent counseling the patient, reviewing the rationale for the above recommendations and reviewing the patient's current medication list, problem list and results of all previously ordered testing.       Dung Looney MD  [facility]    NOTE: This report was transcribed using voice recognition software. Every effort was made to ensure accuracy; however, inadvertent computerized transcription errors may be present.

## 2025-04-27 NOTE — PLAN OF CARE
Problem: Chronic Conditions and Co-morbidities  Goal: Patient's chronic conditions and co-morbidity symptoms are monitored and maintained or improved  4/27/2025 0827 by Nikia Kyle RN  Outcome: Progressing     Problem: Discharge Planning  Goal: Discharge to home or other facility with appropriate resources  4/27/2025 0827 by Nikia Kyle RN  Outcome: Progressing     Problem: Safety - Adult  Goal: Free from fall injury  4/27/2025 0827 by Nikia Kyle RN  Outcome: Progressing     Problem: ABCDS Injury Assessment  Goal: Absence of physical injury  4/27/2025 0827 by Nikia Kyle RN  Outcome: Progressing

## 2025-04-27 NOTE — PROGRESS NOTES
[facility] PHYSICIANS[facility]  Inpatient progress note  PATIENT: Trace Nassar  MEDICAL RECORD NUMBER: [id number]  DATE OF SERVICE:  4/27/2025  ATTENDING ELECTROPHYSIOLOGIST: Rosa Ash MD  PRIMARY ELECTROPHYSIOLOGIST: Rosa Ash MD  REFERRING PHYSICIAN: Pablo Bennett DO and Silvino Garcia DO  CHIEF COMPLAINT: Syncope    HPI: This is a 85 y.o. male with a history of coronary artery disease with PCI of the LAD in 2001, inferior wall STEMI in 2014-stent to dominant circumflex, 75% LAD stent obstruction, hyperlipidemia, permanent atrial fibrillation possibly dating back to 2014, GARRY guided cardioversion 2014, subsequent rate control only, mitral regurgitation, carotid disease and possible CVA, aortic stenosis  He is followed by Dr. Layne and has been on Coumadin, metoprolol 50 mg daily, Lasix 20 mg daily, losartan 25 mg daily, aspirin, Lipitor, Protonix and vitamin D.  The patient was hospitalized in 2022 with symptoms of persistent cough which was felt to be secondary to bronchitis.  He did not have symptoms of congestive heart failure but his INR was subtherapeutic and his dose of Coumadin was reduced.  Patient presents to the hospital now with an episode of sudden syncope.  Patient denies any prior hospitals of syncope or near syncope.  He had his usual morning breakfast and his morning pills on the day of this event.  He walked into [employer] and was doing his usual shopping when all of a sudden he fell.  He does not recall any premonitory symptoms but feels that he collapsed suddenly.  EMS was called and the patient was brought to the hospital.  The patient had no symptoms prior to or after the episode.  He has been seen by cardiology and is scheduled for coronary evaluation since his echocardiography revealed significant aortic stenosis.   Cardiac electrophysiology service is consulted for possible bradycardia related syncope on  clubbing, no edema   Skin: warm, no rashes     I have personally reviewed the laboratory, cardiac diagnostic and radiographic testing as outlined below:    Data:    Recent Labs     25  0400 25  0606 25  0632   WBC 6.0 5.4 5.0   HGB 11.6* 13.1 12.5   HCT 36.0* 40.6 38.8    177 148     Recent Labs     25  0400 25  0606 25  0632    136 138   K 4.0 4.0 4.2    102 107   CO2 22 21* 21*   BUN 22 16 18   CREATININE 1.2 1.1 1.1   CALCIUM 9.1 9.4 9.3      Lab Results   Component Value Date/Time    MG 2.0 2025 07:15 AM     No results for input(s): \"TSH\" in the last 72 hours.  Recent Labs     25  1348 25  0606 25  0632   INR 1.8 1.5 1.5       CXR:   25  FINDINGS:  The heart is enlarged.  Pulmonary vessels are congested.  Hazy retrocardiac  density.  No pneumothorax.  Mild blunting of left costophrenic angle.  Elevated right hemidiaphragm.     IMPRESSION:  1. Cardiomegaly with pulmonary vascular congestion.  2. Hazy retrocardiac density may represent atelectasis or developing  pneumonia.  3. Possible small left pleural effusion.    Telemetry: Atrial fibrillation with controlled ventricular response    EK25: Atrial fibrillation with slow ventricular response, RBBB, inferior Q waves    Echocardiogram:   25  Interpretation Summary  Show Result Comparison     Left Ventricle: Mildly reduced left ventricular systolic function with a visually estimated EF of 40 - 45%. Left ventricle size is normal. Moderate basal septal thickening. Severe hypokinesis of the following segments: basal inferior, apical septal and apical inferior. Abnormal diastolic function.    Right Ventricle: Right ventricle is mildly dilated. Moderately reduced systolic function.    Aortic Valve: Classic low flow/low gradient severe aortic stenosis with low EF. AV mean gradient is 21 mmHg. AV peak velocity is 3.5 m/s. LVOT:AV VTI Index is 0.21. AV area by continuity VTI is

## 2025-04-27 NOTE — PROGRESS NOTES
[facility] Medicine    Subjective:  pt alert conversive      Current Facility-Administered Medications:     enoxaparin (LOVENOX) injection 60 mg, 1 mg/kg, SubCUTAneous, BID, Steven Nava DO, 60 mg at 04/26/25 2000    sodium chloride flush 0.9 % injection 10 mL, 10 mL, IntraVENous, 2 times per day, Nikki Cardoso APRN - CNP, 10 mL at 04/26/25 2001    sodium chloride flush 0.9 % injection 10 mL, 10 mL, IntraVENous, PRN, Nikki Cardoso APRN - CNP    0.9 % sodium chloride infusion, , IntraVENous, PRN, Nikki Cardoso APRN - CNP    ondansetron (ZOFRAN-ODT) disintegrating tablet 4 mg, 4 mg, Oral, Q8H PRN **OR** ondansetron (ZOFRAN) injection 4 mg, 4 mg, IntraVENous, Q6H PRN, Nikki Cardoso APRN - CNP    senna (SENOKOT) tablet 8.6 mg, 1 tablet, Oral, Daily PRN, Nikki Cardoso APRN - CNP    acetaminophen (TYLENOL) tablet 650 mg, 650 mg, Oral, Q6H PRN **OR** acetaminophen (TYLENOL) suppository 650 mg, 650 mg, Rectal, Q6H PRN, Nikki Cardoso APRN - CNP    melatonin tablet 6 mg, 6 mg, Oral, Nightly PRN, Nikki Cardoso APRN - CNP    pantoprazole (PROTONIX) tablet 40 mg, 40 mg, Oral, QAM AC, Nikki Cardoso APRN - CNP, 40 mg at 04/27/25 0520    losartan (COZAAR) tablet 25 mg, 25 mg, Oral, Daily, Nikki Cardoso APRN - CNP, 25 mg at 04/26/25 0800    atorvastatin (LIPITOR) tablet 20 mg, 20 mg, Oral, Nightly, Nikki Cardoso APRN - CNP, 20 mg at 04/26/25 2000    glucose chewable tablet 16 g, 4 tablet, Oral, PRN, Nikki Cardoso, KOSTA - CNP    dextrose bolus 10% 125 mL, 125 mL, IntraVENous, PRN **OR** dextrose bolus 10% 250 mL, 250 mL, IntraVENous, PRN, Nikki Cardoso APRN - CNP    glucagon injection 1 mg, 1 mg, SubCUTAneous, PRN, Nikki Cardoso APRN - CNP    dextrose 10 % infusion, , IntraVENous, Continuous PRN, Nikki Cardoso APRN - CNP    insulin lispro (HUMALOG,ADMELOG) injection vial 0-4 Units, 0-4 Units, SubCUTAneous, 4x Daily AC & HS, Nikki Cardoso APRN - CNP    Objective:    BP (!) 141/60    Pulse 67   Temp 98.7 °F (37.1 °C) (Temporal)   Resp 18   Ht 1.6 m (5' 3\")   Wt 63 kg (139 lb)   SpO2 95%   BMI 24.62 kg/m²     Heart:  irreg with syst murmur  Lungs:  ctab  Abd: + bs soft nontender  Extrem:  w/o edema    CBC with Differential:    Lab Results   Component Value Date/Time    WBC 5.4 04/26/2025 06:06 AM    RBC 4.48 04/26/2025 06:06 AM    HGB 13.1 04/26/2025 06:06 AM    HCT 40.6 04/26/2025 06:06 AM     04/26/2025 06:06 AM    MCV 90.6 04/26/2025 06:06 AM    MCH 29.2 04/26/2025 06:06 AM    MCHC 32.3 04/26/2025 06:06 AM    RDW 15.8 04/26/2025 06:06 AM    LYMPHOPCT 26 04/26/2025 06:06 AM    MONOPCT 11 04/26/2025 06:06 AM    EOSPCT 3 04/26/2025 06:06 AM    BASOPCT 1 04/26/2025 06:06 AM    MONOSABS 0.61 04/26/2025 06:06 AM    LYMPHSABS 1.42 04/26/2025 06:06 AM    EOSABS 0.14 04/26/2025 06:06 AM    BASOSABS 0.06 04/26/2025 06:06 AM     CMP:    Lab Results   Component Value Date/Time     04/26/2025 06:06 AM    K 4.0 04/26/2025 06:06 AM    K 4.0 11/25/2022 10:10 AM     04/26/2025 06:06 AM    CO2 21 04/26/2025 06:06 AM    BUN 16 04/26/2025 06:06 AM    CREATININE 1.1 04/26/2025 06:06 AM    GFRAA 59 05/26/2019 09:24 AM    LABGLOM 69 04/26/2025 06:06 AM    LABGLOM 55 11/26/2022 01:17 PM    GLUCOSE 102 04/26/2025 06:06 AM    CALCIUM 9.4 04/26/2025 06:06 AM    BILITOT 1.6 04/26/2025 06:06 AM    ALKPHOS 62 04/26/2025 06:06 AM    AST 25 04/26/2025 06:06 AM    ALT 16 04/26/2025 06:06 AM     Warfarin PT/INR:    Lab Results   Component Value Date    INR 1.5 04/26/2025    INR 1.8 04/25/2025    INR 1.9 04/25/2025    PROTIME 16.9 (H) 04/26/2025    PROTIME 19.5 (H) 04/25/2025    PROTIME 20.3 (H) 04/25/2025       Assessment:    Principal Problem:    Severe aortic stenosis  Active Problems:    Syncope  Resolved Problems:    * No resolved hospital problems. *      Plan:  Heart cath per cardio / lovenox while coumadin on hold        Steven Nava DO  6:55 AM  4/27/2025

## 2025-04-28 PROBLEM — I48.21 PERMANENT ATRIAL FIBRILLATION (HCC): Status: ACTIVE | Noted: 2025-04-28

## 2025-04-28 LAB
ABO + RH BLD: NORMAL
ALBUMIN SERPL-MCNC: 3.2 G/DL (ref 3.5–5.2)
ALP SERPL-CCNC: 59 U/L (ref 40–129)
ALT SERPL-CCNC: 16 U/L (ref 0–50)
ANION GAP SERPL CALCULATED.3IONS-SCNC: 11 MMOL/L (ref 7–16)
ARM BAND NUMBER: NORMAL
AST SERPL-CCNC: 26 U/L (ref 0–50)
BASOPHILS # BLD: 0.04 K/UL (ref 0–0.2)
BASOPHILS NFR BLD: 1 % (ref 0–2)
BILIRUB SERPL-MCNC: 1.1 MG/DL (ref 0–1.2)
BLOOD BANK SAMPLE EXPIRATION: NORMAL
BLOOD GROUP ANTIBODIES SERPL: NEGATIVE
BUN SERPL-MCNC: 17 MG/DL (ref 8–23)
CALCIUM SERPL-MCNC: 9.3 MG/DL (ref 8.8–10.2)
CHLORIDE SERPL-SCNC: 104 MMOL/L (ref 98–107)
CO2 SERPL-SCNC: 19 MMOL/L (ref 22–29)
CREAT SERPL-MCNC: 1.1 MG/DL (ref 0.7–1.2)
ECHO BSA: 1.67 M2
EOSINOPHIL # BLD: 0.17 K/UL (ref 0.05–0.5)
EOSINOPHILS RELATIVE PERCENT: 3 % (ref 0–6)
ERYTHROCYTE [DISTWIDTH] IN BLOOD BY AUTOMATED COUNT: 16.1 % (ref 11.5–15)
GFR, ESTIMATED: 64 ML/MIN/1.73M2
GLUCOSE BLD-MCNC: 103 MG/DL (ref 74–99)
GLUCOSE BLD-MCNC: 121 MG/DL (ref 74–99)
GLUCOSE BLD-MCNC: 123 MG/DL (ref 74–99)
GLUCOSE BLD-MCNC: 161 MG/DL (ref 74–99)
GLUCOSE SERPL-MCNC: 111 MG/DL (ref 74–99)
HCT VFR BLD AUTO: 38.9 % (ref 37–54)
HGB BLD-MCNC: 12.7 G/DL (ref 12.5–16.5)
IMM GRANULOCYTES # BLD AUTO: <0.03 K/UL (ref 0–0.58)
IMM GRANULOCYTES NFR BLD: 0 % (ref 0–5)
INR PPP: 1.4
LYMPHOCYTES NFR BLD: 1.57 K/UL (ref 1.5–4)
LYMPHOCYTES RELATIVE PERCENT: 26 % (ref 20–42)
MCH RBC QN AUTO: 29.6 PG (ref 26–35)
MCHC RBC AUTO-ENTMCNC: 32.6 G/DL (ref 32–34.5)
MCV RBC AUTO: 90.7 FL (ref 80–99.9)
MONOCYTES NFR BLD: 0.75 K/UL (ref 0.1–0.95)
MONOCYTES NFR BLD: 12 % (ref 2–12)
NEUTROPHILS NFR BLD: 58 % (ref 43–80)
NEUTS SEG NFR BLD: 3.55 K/UL (ref 1.8–7.3)
PLATELET # BLD AUTO: 163 K/UL (ref 130–450)
PMV BLD AUTO: 11.5 FL (ref 7–12)
POTASSIUM SERPL-SCNC: 4.1 MMOL/L (ref 3.5–5.1)
PROT SERPL-MCNC: 7.1 G/DL (ref 6.4–8.3)
PROTHROMBIN TIME: 15 SEC (ref 9.3–12.4)
RBC # BLD AUTO: 4.29 M/UL (ref 3.8–5.8)
SODIUM SERPL-SCNC: 134 MMOL/L (ref 136–145)
WBC OTHER # BLD: 6.1 K/UL (ref 4.5–11.5)

## 2025-04-28 PROCEDURE — 36415 COLL VENOUS BLD VENIPUNCTURE: CPT

## 2025-04-28 PROCEDURE — 2500000003 HC RX 250 WO HCPCS: Performed by: NURSE PRACTITIONER

## 2025-04-28 PROCEDURE — 6360000002 HC RX W HCPCS: Performed by: INTERNAL MEDICINE

## 2025-04-28 PROCEDURE — 6360000004 HC RX CONTRAST MEDICATION: Performed by: INTERNAL MEDICINE

## 2025-04-28 PROCEDURE — 6370000000 HC RX 637 (ALT 250 FOR IP): Performed by: NURSE PRACTITIONER

## 2025-04-28 PROCEDURE — 86900 BLOOD TYPING SEROLOGIC ABO: CPT

## 2025-04-28 PROCEDURE — 6370000000 HC RX 637 (ALT 250 FOR IP): Performed by: INTERNAL MEDICINE

## 2025-04-28 PROCEDURE — 85025 COMPLETE CBC W/AUTO DIFF WBC: CPT

## 2025-04-28 PROCEDURE — 4A023N7 MEASUREMENT OF CARDIAC SAMPLING AND PRESSURE, LEFT HEART, PERCUTANEOUS APPROACH: ICD-10-PCS | Performed by: INTERNAL MEDICINE

## 2025-04-28 PROCEDURE — 93454 CORONARY ARTERY ANGIO S&I: CPT | Performed by: INTERNAL MEDICINE

## 2025-04-28 PROCEDURE — B2111ZZ FLUOROSCOPY OF MULTIPLE CORONARY ARTERIES USING LOW OSMOLAR CONTRAST: ICD-10-PCS | Performed by: INTERNAL MEDICINE

## 2025-04-28 PROCEDURE — 85610 PROTHROMBIN TIME: CPT

## 2025-04-28 PROCEDURE — 82962 GLUCOSE BLOOD TEST: CPT

## 2025-04-28 PROCEDURE — 80053 COMPREHEN METABOLIC PANEL: CPT

## 2025-04-28 PROCEDURE — 86850 RBC ANTIBODY SCREEN: CPT

## 2025-04-28 PROCEDURE — 2500000003 HC RX 250 WO HCPCS: Performed by: INTERNAL MEDICINE

## 2025-04-28 PROCEDURE — 86901 BLOOD TYPING SEROLOGIC RH(D): CPT

## 2025-04-28 PROCEDURE — 2709999900 HC NON-CHARGEABLE SUPPLY: Performed by: INTERNAL MEDICINE

## 2025-04-28 PROCEDURE — C1894 INTRO/SHEATH, NON-LASER: HCPCS | Performed by: INTERNAL MEDICINE

## 2025-04-28 PROCEDURE — 99233 SBSQ HOSP IP/OBS HIGH 50: CPT | Performed by: INTERNAL MEDICINE

## 2025-04-28 PROCEDURE — 2140000000 HC CCU INTERMEDIATE R&B

## 2025-04-28 PROCEDURE — C1769 GUIDE WIRE: HCPCS | Performed by: INTERNAL MEDICINE

## 2025-04-28 RX ORDER — SODIUM CHLORIDE 0.9 % (FLUSH) 0.9 %
5-40 SYRINGE (ML) INJECTION PRN
Status: DISCONTINUED | OUTPATIENT
Start: 2025-04-28 | End: 2025-05-05 | Stop reason: HOSPADM

## 2025-04-28 RX ORDER — ASPIRIN 325 MG
325 TABLET ORAL ONCE
Status: COMPLETED | OUTPATIENT
Start: 2025-04-28 | End: 2025-04-28

## 2025-04-28 RX ORDER — HYDRALAZINE HYDROCHLORIDE 20 MG/ML
INJECTION INTRAMUSCULAR; INTRAVENOUS PRN
Status: DISCONTINUED | OUTPATIENT
Start: 2025-04-28 | End: 2025-04-28 | Stop reason: HOSPADM

## 2025-04-28 RX ORDER — MIDAZOLAM HYDROCHLORIDE 1 MG/ML
INJECTION, SOLUTION INTRAMUSCULAR; INTRAVENOUS PRN
Status: DISCONTINUED | OUTPATIENT
Start: 2025-04-28 | End: 2025-04-28 | Stop reason: HOSPADM

## 2025-04-28 RX ORDER — SODIUM CHLORIDE 9 MG/ML
INJECTION, SOLUTION INTRAVENOUS PRN
Status: DISCONTINUED | OUTPATIENT
Start: 2025-04-28 | End: 2025-05-05 | Stop reason: HOSPADM

## 2025-04-28 RX ORDER — ENOXAPARIN SODIUM 100 MG/ML
1 INJECTION SUBCUTANEOUS 2 TIMES DAILY
Status: DISCONTINUED | OUTPATIENT
Start: 2025-04-29 | End: 2025-04-29

## 2025-04-28 RX ORDER — ACETAMINOPHEN 325 MG/1
650 TABLET ORAL EVERY 4 HOURS PRN
Status: DISCONTINUED | OUTPATIENT
Start: 2025-04-28 | End: 2025-04-30 | Stop reason: SDUPTHER

## 2025-04-28 RX ORDER — SODIUM CHLORIDE 0.9 % (FLUSH) 0.9 %
5-40 SYRINGE (ML) INJECTION EVERY 12 HOURS SCHEDULED
Status: DISCONTINUED | OUTPATIENT
Start: 2025-04-28 | End: 2025-05-05 | Stop reason: HOSPADM

## 2025-04-28 RX ORDER — IOPAMIDOL 755 MG/ML
INJECTION, SOLUTION INTRAVASCULAR PRN
Status: DISCONTINUED | OUTPATIENT
Start: 2025-04-28 | End: 2025-04-28 | Stop reason: HOSPADM

## 2025-04-28 RX ORDER — VERAPAMIL HYDROCHLORIDE 2.5 MG/ML
INJECTION INTRAVENOUS PRN
Status: DISCONTINUED | OUTPATIENT
Start: 2025-04-28 | End: 2025-04-28 | Stop reason: HOSPADM

## 2025-04-28 RX ORDER — HEPARIN SODIUM 10000 [USP'U]/ML
INJECTION, SOLUTION INTRAVENOUS; SUBCUTANEOUS PRN
Status: DISCONTINUED | OUTPATIENT
Start: 2025-04-28 | End: 2025-04-28 | Stop reason: HOSPADM

## 2025-04-28 RX ADMIN — SODIUM CHLORIDE, PRESERVATIVE FREE 10 ML: 5 INJECTION INTRAVENOUS at 20:37

## 2025-04-28 RX ADMIN — SODIUM CHLORIDE, PRESERVATIVE FREE 10 ML: 5 INJECTION INTRAVENOUS at 10:24

## 2025-04-28 RX ADMIN — ATORVASTATIN CALCIUM 20 MG: 20 TABLET, FILM COATED ORAL at 20:36

## 2025-04-28 RX ADMIN — ASPIRIN 325 MG: 325 TABLET ORAL at 08:25

## 2025-04-28 RX ADMIN — LOSARTAN POTASSIUM 25 MG: 25 TABLET, FILM COATED ORAL at 10:24

## 2025-04-28 RX ADMIN — PANTOPRAZOLE SODIUM 40 MG: 40 TABLET, DELAYED RELEASE ORAL at 06:27

## 2025-04-28 ASSESSMENT — PAIN SCALES - GENERAL
PAINLEVEL_OUTOF10: 0
PAINLEVEL_OUTOF10: 0

## 2025-04-28 NOTE — PROGRESS NOTES
Called patient's son, Jared Nassar, regarding Cath Lab calling for patient to come down for heart cath. Family said that they plan on being at the hospital by 0845 but he was fine for patient to go earlier. Also placed patient's valuables in belongings bags at bedside.      Quan Eduardo RN

## 2025-04-28 NOTE — PROGRESS NOTES
Spoke with patient's son, Jared, who requested that he be contacted when time of heart cath is determined. He would like to be with patient during the procedure.    Quan Eduardo RN

## 2025-04-28 NOTE — PROGRESS NOTES
[facility] Medicine    Subjective:  pt alert conversive sched for heart cath today      Current Facility-Administered Medications:     guaiFENesin tablet 400 mg, 400 mg, Oral, BID PRN, Steven Nava, , 400 mg at 04/27/25 2059    enoxaparin (LOVENOX) injection 60 mg, 1 mg/kg, SubCUTAneous, BID, Steven Nava, , 60 mg at 04/27/25 1936    sodium chloride flush 0.9 % injection 10 mL, 10 mL, IntraVENous, 2 times per day, Nikki Cardoso APRN - CNP, 10 mL at 04/27/25 1936    sodium chloride flush 0.9 % injection 10 mL, 10 mL, IntraVENous, PRN, Nikki Cardoso APRN - CNP    0.9 % sodium chloride infusion, , IntraVENous, PRN, Nikki Cardoso APRN - CNP    ondansetron (ZOFRAN-ODT) disintegrating tablet 4 mg, 4 mg, Oral, Q8H PRN **OR** ondansetron (ZOFRAN) injection 4 mg, 4 mg, IntraVENous, Q6H PRN, Nikki Cardoso APRN - CNP    senna (SENOKOT) tablet 8.6 mg, 1 tablet, Oral, Daily PRN, Nikki Cardoso APRN - CNP    acetaminophen (TYLENOL) tablet 650 mg, 650 mg, Oral, Q6H PRN **OR** acetaminophen (TYLENOL) suppository 650 mg, 650 mg, Rectal, Q6H PRN, Nikki Cardoso APRN - CNP    melatonin tablet 6 mg, 6 mg, Oral, Nightly PRN, Nikki Cardoso APRN - CNP    pantoprazole (PROTONIX) tablet 40 mg, 40 mg, Oral, QAM AC, Nikki Cardoso APRN - CNP, 40 mg at 04/28/25 0627    losartan (COZAAR) tablet 25 mg, 25 mg, Oral, Daily, Nikki Cardoso APRN - CNP, 25 mg at 04/27/25 0817    atorvastatin (LIPITOR) tablet 20 mg, 20 mg, Oral, Nightly, Nikki Cardoso APRN - CNP, 20 mg at 04/27/25 1936    glucose chewable tablet 16 g, 4 tablet, Oral, PRN, Nikki Cardoso APRN - CNP    dextrose bolus 10% 125 mL, 125 mL, IntraVENous, PRN **OR** dextrose bolus 10% 250 mL, 250 mL, IntraVENous, Walker HURD Amanda R, APRN - CNP    glucagon injection 1 mg, 1 mg, SubCUTAneous, Walker HURD Amanda R, APRN - CNP    dextrose 10 % infusion, , IntraVENous, Continuous Walker HURD Amanda R, APRN - CNP    insulin lispro (HUMALOG,ADMELOG)

## 2025-04-28 NOTE — PROGRESS NOTES
Contacted Dr. Nava regarding patient request for Mucinex. Patient stated that he takes at home occassionally. Updated med rec with history as well.    Quna Eduardo RN

## 2025-04-28 NOTE — PROGRESS NOTES
[facility] PHYSICIANS- The Heart and Vascular [place] Electrophysiology  Inpatient progress note  PATIENT: Trace Nassar  MEDICAL RECORD NUMBER: [id number]  DATE OF SERVICE:  4/28/2025  ATTENDING ELECTROPHYSIOLOGIST: Ozzie Kelley MD   PRIMARY ELECTROPHYSIOLOGIST: Rosa Ash MD  REFERRING PHYSICIAN: Pablo Bennett DO and Silvino Garcia DO  CHIEF COMPLAINT: Syncope    HPI: This is a 85 y.o. male with a history of coronary artery disease with PCI of the LAD in 2001, inferior wall STEMI in 2014-stent to dominant circumflex, 75% LAD stent obstruction, hyperlipidemia, permanent atrial fibrillation possibly dating back to 2014, GARRY guided cardioversion 2014, subsequent rate control only, mitral regurgitation, carotid disease and possible CVA, aortic stenosis  He is followed by Dr. Layne and has been on Coumadin, metoprolol 50 mg daily, Lasix 20 mg daily, losartan 25 mg daily, aspirin, Lipitor, Protonix and vitamin D.  The patient was hospitalized in 2022 with symptoms of persistent cough which was felt to be secondary to bronchitis.  He did not have symptoms of congestive heart failure but his INR was subtherapeutic and his dose of Coumadin was reduced.  Patient presents to the hospital now with an episode of sudden syncope.  Patient denies any prior hospitals of syncope or near syncope.  He had his usual morning breakfast and his morning pills on the day of this event.  He walked into [employer] and was doing his usual shopping when all of a sudden he fell.  He does not recall any premonitory symptoms but feels that he collapsed suddenly.  EMS was called and the patient was brought to the hospital.  The patient had no symptoms prior to or after the episode.  He has been seen by cardiology and is scheduled for coronary evaluation since his echocardiography revealed significant aortic stenosis.   Cardiac electrophysiology service is consulted for possible bradycardia related syncope on  MD  Cardiac Electrophysiology  [facility] Physicians  The Heart and Vascular [place]: [place] Electrophysiology  2:02 PM  4/28/2025

## 2025-04-28 NOTE — PROGRESS NOTES
Physician Progress Note      PATIENT:               ALYSHA JOSUE  CSN #:                  [id number]  :                       1940  ADMIT DATE:       2025 1:14 PM  DISCH DATE:        2025 10:21 PM  RESPONDING  PROVIDER #:        BENJAMIN Alston CNP          QUERY TEXT:    Syncope is documented in the medical record H&P to DS.  Please clarify the   cause such as:    The clinical indicators include:  Per cardiology: Syncope could be related to bradycardia or severe AS likely   combination of both...Will need cath, TAVR workup and EP eval, Plan transfer   for heart catheterization once INR less than 2 scheduled for   Options provided:  -- Syncope related to severe AS  -- Syncope related to bradycardia  -- Syncope related to severe AS and Bradycardia  -- Other - I will add my own diagnosis  -- Disagree - Not applicable / Not valid  -- Disagree - Clinically unable to determine / Unknown  -- Refer to Clinical Documentation Reviewer    PROVIDER RESPONSE TEXT:    The syncope is related to severe AS and Bradycardia    Query created by: Malaika Manzano on 2025 10:30 AM      Electronically signed by:  BENJAMIN Alston CNP 2025 10:32 AM

## 2025-04-28 NOTE — CARE COORDINATION
Patient transferred to hospital from [facility] where he presented  for dizziness. Cardiology,and EPS following.  Cardio thoracic consulted for severe aortic stenosis.   .Patient NPO for cardiac cath today. Met with patient at bedside to discuss transition of care. Patient lives alone in a bi level home and was independent PTA. His PCP is Dr. Merlin Garcia and he uses mail order for prescriptions He has  h/o  [facility] and plans to return home with no needs. Ine of his sons will transport when medically  cleared.  Case Management Assessment  Initial Evaluation    Date/Time of Evaluation: 4/28/2025 8:11 AM  Assessment Completed by: Pretty Gonzalez RN    If patient is discharged prior to next notation, then this note serves as note for discharge by case management.    Patient Name: Trace Nassar                   YOB: 1940  Diagnosis: Aortic stenosis [I35.0]  Severe aortic stenosis [I35.0]                   Date / Time: 4/25/2025 10:56 PM    Patient Admission Status: Inpatient   Readmission Risk (Low < 19, Mod (19-27), High > 27): Readmission Risk Score: 13.1    Current PCP: Silvino Garcia, DO  PCP verified by ? Yes    Chart Reviewed: Yes      History Provided by: Patient  Patient Orientation: Alert and Oriented    Patient Cognition: Alert    Hospitalization in the last 30 days (Readmission):  Yes    If yes, Readmission Assessment in  Navigator will be completed.    Advance Directives:      Code Status: Full Code   Patient's Primary Decision Maker is: Legal Next of Kin      Discharge Planning:    Patient lives with: Alone Type of Home: House  Primary Care Giver: Self  Patient Support Systems include: Children   Current Financial resources:    Current community resources:    Current services prior to admission: None            Current DME:              Type of Home Care services:  None    ADLS  Prior functional level: Independent in ADLs/IADLs  Current functional level: Independent in  individualized plan of care/goals and shares the quality data associated with the providers was provided to:     Patient Representative Name:       The Patient and/or Patient Representative Agree with the Discharge Plan?      Pretty Gonzalez RN  Case Management Department  Ph: [phone] Fax: [phone]

## 2025-04-28 NOTE — PLAN OF CARE
Problem: Chronic Conditions and Co-morbidities  Goal: Patient's chronic conditions and co-morbidity symptoms are monitored and maintained or improved  4/27/2025 2119 by Quan Eduardo RN  Outcome: Progressing  4/27/2025 0827 by Nikia Kyle RN  Outcome: Progressing     Problem: Discharge Planning  Goal: Discharge to home or other facility with appropriate resources  4/27/2025 2119 by Quan Eduardo RN  Outcome: Progressing  4/27/2025 0827 by Nikia Kyle RN  Outcome: Progressing     Problem: Safety - Adult  Goal: Free from fall injury  4/27/2025 2119 by Quan Eduardo RN  Outcome: Progressing  4/27/2025 0827 by Nikia Kyle RN  Outcome: Progressing     Problem: ABCDS Injury Assessment  Goal: Absence of physical injury  4/27/2025 2119 by Quan dEuardo RN  Outcome: Progressing  4/27/2025 0827 by Nikia Kyle RN  Outcome: Progressing

## 2025-04-28 NOTE — ACP (ADVANCE CARE PLANNING)
Advance Care Planning   Healthcare Decision Maker:    Primary Decision Maker: Jared Josue - Child - [phone]    Primary Decision Maker: ALDO JOSUE - Child - [phone]      Today we documented Decision Maker(s) consistent with Legal Next of Kin hierarchy.

## 2025-04-29 ENCOUNTER — APPOINTMENT (OUTPATIENT)
Dept: CT IMAGING | Age: 85
DRG: 324 | End: 2025-04-29
Attending: THORACIC SURGERY (CARDIOTHORACIC VASCULAR SURGERY)
Payer: MEDICARE

## 2025-04-29 LAB
ALBUMIN SERPL-MCNC: 3.1 G/DL (ref 3.5–5.2)
ALP SERPL-CCNC: 55 U/L (ref 40–129)
ALT SERPL-CCNC: 18 U/L (ref 0–50)
ANION GAP SERPL CALCULATED.3IONS-SCNC: 12 MMOL/L (ref 7–16)
AST SERPL-CCNC: 32 U/L (ref 0–50)
BASOPHILS # BLD: 0.03 K/UL (ref 0–0.2)
BASOPHILS NFR BLD: 1 % (ref 0–2)
BILIRUB SERPL-MCNC: 1.1 MG/DL (ref 0–1.2)
BUN SERPL-MCNC: 15 MG/DL (ref 8–23)
CALCIUM SERPL-MCNC: 9.3 MG/DL (ref 8.8–10.2)
CHLORIDE SERPL-SCNC: 105 MMOL/L (ref 98–107)
CO2 SERPL-SCNC: 19 MMOL/L (ref 22–29)
CREAT SERPL-MCNC: 1.2 MG/DL (ref 0.7–1.2)
EOSINOPHIL # BLD: 0.14 K/UL (ref 0.05–0.5)
EOSINOPHILS RELATIVE PERCENT: 3 % (ref 0–6)
ERYTHROCYTE [DISTWIDTH] IN BLOOD BY AUTOMATED COUNT: 16.3 % (ref 11.5–15)
GFR, ESTIMATED: 62 ML/MIN/1.73M2
GLUCOSE BLD-MCNC: 111 MG/DL (ref 74–99)
GLUCOSE BLD-MCNC: 111 MG/DL (ref 74–99)
GLUCOSE BLD-MCNC: 125 MG/DL (ref 74–99)
GLUCOSE SERPL-MCNC: 98 MG/DL (ref 74–99)
HCT VFR BLD AUTO: 37.5 % (ref 37–54)
HGB BLD-MCNC: 12.3 G/DL (ref 12.5–16.5)
IMM GRANULOCYTES # BLD AUTO: <0.03 K/UL (ref 0–0.58)
IMM GRANULOCYTES NFR BLD: 0 % (ref 0–5)
INR PPP: 1.4
LYMPHOCYTES NFR BLD: 1.17 K/UL (ref 1.5–4)
LYMPHOCYTES RELATIVE PERCENT: 26 % (ref 20–42)
MCH RBC QN AUTO: 29.6 PG (ref 26–35)
MCHC RBC AUTO-ENTMCNC: 32.8 G/DL (ref 32–34.5)
MCV RBC AUTO: 90.1 FL (ref 80–99.9)
MONOCYTES NFR BLD: 0.58 K/UL (ref 0.1–0.95)
MONOCYTES NFR BLD: 13 % (ref 2–12)
NEUTROPHILS NFR BLD: 58 % (ref 43–80)
NEUTS SEG NFR BLD: 2.65 K/UL (ref 1.8–7.3)
PLATELET # BLD AUTO: 159 K/UL (ref 130–450)
PMV BLD AUTO: 11.4 FL (ref 7–12)
POTASSIUM SERPL-SCNC: 4.3 MMOL/L (ref 3.5–5.1)
PROT SERPL-MCNC: 6.9 G/DL (ref 6.4–8.3)
PROTHROMBIN TIME: 15 SEC (ref 9.3–12.4)
RBC # BLD AUTO: 4.16 M/UL (ref 3.8–5.8)
SODIUM SERPL-SCNC: 137 MMOL/L (ref 136–145)
WBC OTHER # BLD: 4.6 K/UL (ref 4.5–11.5)

## 2025-04-29 PROCEDURE — 2140000000 HC CCU INTERMEDIATE R&B

## 2025-04-29 PROCEDURE — 75572 CT HRT W/3D IMAGE: CPT

## 2025-04-29 PROCEDURE — 99233 SBSQ HOSP IP/OBS HIGH 50: CPT | Performed by: INTERNAL MEDICINE

## 2025-04-29 PROCEDURE — 6370000000 HC RX 637 (ALT 250 FOR IP): Performed by: NURSE PRACTITIONER

## 2025-04-29 PROCEDURE — 2500000003 HC RX 250 WO HCPCS: Performed by: INTERNAL MEDICINE

## 2025-04-29 PROCEDURE — 85610 PROTHROMBIN TIME: CPT

## 2025-04-29 PROCEDURE — 6370000000 HC RX 637 (ALT 250 FOR IP): Performed by: INTERNAL MEDICINE

## 2025-04-29 PROCEDURE — 85025 COMPLETE CBC W/AUTO DIFF WBC: CPT

## 2025-04-29 PROCEDURE — 82962 GLUCOSE BLOOD TEST: CPT

## 2025-04-29 PROCEDURE — 80053 COMPREHEN METABOLIC PANEL: CPT

## 2025-04-29 PROCEDURE — 6360000002 HC RX W HCPCS: Performed by: INTERNAL MEDICINE

## 2025-04-29 PROCEDURE — 71275 CT ANGIOGRAPHY CHEST: CPT

## 2025-04-29 PROCEDURE — 36415 COLL VENOUS BLD VENIPUNCTURE: CPT

## 2025-04-29 PROCEDURE — 2500000003 HC RX 250 WO HCPCS: Performed by: NURSE PRACTITIONER

## 2025-04-29 PROCEDURE — 6360000004 HC RX CONTRAST MEDICATION: Performed by: RADIOLOGY

## 2025-04-29 PROCEDURE — 74174 CTA ABD&PLVS W/CONTRAST: CPT

## 2025-04-29 RX ORDER — CLOPIDOGREL BISULFATE 75 MG/1
75 TABLET ORAL DAILY
Status: DISCONTINUED | OUTPATIENT
Start: 2025-04-30 | End: 2025-05-05 | Stop reason: HOSPADM

## 2025-04-29 RX ORDER — CLOPIDOGREL 300 MG/1
600 TABLET, FILM COATED ORAL ONCE
Status: COMPLETED | OUTPATIENT
Start: 2025-04-29 | End: 2025-04-29

## 2025-04-29 RX ORDER — SENNOSIDES 8.6 MG
325 CAPSULE ORAL ONCE
Status: COMPLETED | OUTPATIENT
Start: 2025-04-29 | End: 2025-04-29

## 2025-04-29 RX ORDER — ASPIRIN 81 MG/1
81 TABLET ORAL DAILY
Status: DISCONTINUED | OUTPATIENT
Start: 2025-04-30 | End: 2025-05-05 | Stop reason: HOSPADM

## 2025-04-29 RX ORDER — ENOXAPARIN SODIUM 100 MG/ML
1 INJECTION SUBCUTANEOUS 2 TIMES DAILY
Status: COMPLETED | OUTPATIENT
Start: 2025-04-29 | End: 2025-04-29

## 2025-04-29 RX ORDER — SODIUM CHLORIDE 0.9 % (FLUSH) 0.9 %
10 SYRINGE (ML) INJECTION
Status: DISCONTINUED | OUTPATIENT
Start: 2025-04-29 | End: 2025-05-05 | Stop reason: HOSPADM

## 2025-04-29 RX ORDER — IOPAMIDOL 755 MG/ML
75 INJECTION, SOLUTION INTRAVASCULAR
Status: COMPLETED | OUTPATIENT
Start: 2025-04-29 | End: 2025-04-29

## 2025-04-29 RX ADMIN — ENOXAPARIN SODIUM 60 MG: 100 INJECTION SUBCUTANEOUS at 09:22

## 2025-04-29 RX ADMIN — SODIUM CHLORIDE, PRESERVATIVE FREE 10 ML: 5 INJECTION INTRAVENOUS at 20:52

## 2025-04-29 RX ADMIN — IOPAMIDOL 75 ML: 755 INJECTION, SOLUTION INTRAVENOUS at 06:38

## 2025-04-29 RX ADMIN — SODIUM CHLORIDE, PRESERVATIVE FREE 10 ML: 5 INJECTION INTRAVENOUS at 09:22

## 2025-04-29 RX ADMIN — SODIUM CHLORIDE, PRESERVATIVE FREE 10 ML: 5 INJECTION INTRAVENOUS at 09:23

## 2025-04-29 RX ADMIN — ENOXAPARIN SODIUM 60 MG: 100 INJECTION SUBCUTANEOUS at 20:52

## 2025-04-29 RX ADMIN — LOSARTAN POTASSIUM 25 MG: 25 TABLET, FILM COATED ORAL at 09:22

## 2025-04-29 RX ADMIN — ASPIRIN 325 MG: 325 TABLET, COATED ORAL at 18:32

## 2025-04-29 RX ADMIN — ATORVASTATIN CALCIUM 20 MG: 20 TABLET, FILM COATED ORAL at 20:52

## 2025-04-29 RX ADMIN — CLOPIDOGREL BISULFATE 600 MG: 300 TABLET, FILM COATED ORAL at 18:13

## 2025-04-29 ASSESSMENT — PAIN SCALES - GENERAL
PAINLEVEL_OUTOF10: 0

## 2025-04-29 NOTE — PROGRESS NOTES
Spiritual Health History and Assessment/Progress Note  [facility]    Initial Encounter,  ,  ,      Name: Trace Nassar MRN: [id number]    Age: 85 y.o.     Sex: male   Language: English   Judaism: Croatian Catholic   Severe aortic stenosis     Date: 4/29/2025                           [facility]        Referral/Consult From: Rounding   Encounter Overview/Reason: Initial Encounter  Service Provided For: Patient    Jennifer, Belief, Meaning:   Patient identifies as spiritual and is connected with a jennifer tradition or spiritual practice  Family/Friends No family/friends present      Importance and Influence:  Patient has spiritual/personal beliefs that influence decisions regarding their health  Family/Friends No family/friends present    Community:  Patient is connected with a spiritual community and feels well-supported. Support system includes: Children and Extended family  Family/Friends No family/friends present    Assessment and Plan of Care:     Patient Interventions include: Facilitated expression of thoughts and feelings, Explored spiritual coping/struggle/distress, Engaged in theological reflection, Affirmed coping skills/support systems, and Provided sacramental/Pentecostal ritual  Family/Friends Interventions include: No family/friends present    Patient Plan of Care: Spiritual Care available upon further referral  Family/Friends Plan of Care: No family/friends present    Electronically signed by TRUDY QUEEN on 4/29/2025 at 4:42 PM

## 2025-04-29 NOTE — PROGRESS NOTES
Patient is seen in follow-up for CAD, syncope    Subjective:     Mr. Nassar feels okay today  Laying in bed no apparent distress    ROS:  CONSTITUTIONAL:  negative for  fevers, chills  HEENT:  negative for earaches, nasal congestion and epistaxis  RESPIRATORY:  negative for  dry cough, cough with sputum,wheezing and hemoptysis  GASTROINTESTINAL:  negative for nausea, vomiting  MUSCULOSKELETAL:  negative for  myalgias, arthralgias  NEUROLOGICAL:  negative for visual disturbance, dysphagia    Medication side effects: none    Scheduled Meds:   enoxaparin  1 mg/kg SubCUTAneous BID    clopidogrel  600 mg Oral Once    [START ON 4/30/2025] clopidogrel  75 mg Oral Daily    [START ON 4/30/2025] aspirin  81 mg Oral Daily    aspirin  325 mg Oral Once    sodium chloride flush  5-40 mL IntraVENous 2 times per day    sodium chloride flush  10 mL IntraVENous 2 times per day    pantoprazole  40 mg Oral QAM AC    losartan  25 mg Oral Daily    atorvastatin  20 mg Oral Nightly    insulin lispro  0-4 Units SubCUTAneous 4x Daily AC & HS     Continuous Infusions:   sodium chloride      sodium chloride      dextrose       PRN Meds:sodium chloride flush, sodium chloride flush, sodium chloride, acetaminophen, guaiFENesin, sodium chloride flush, sodium chloride, ondansetron **OR** ondansetron, senna, acetaminophen **OR** acetaminophen, melatonin, glucose, dextrose bolus **OR** dextrose bolus, glucagon (rDNA), dextrose    I/O last 3 completed shifts:  In: 720 [P.O.:720]  Out: 1335 [Urine:1325; Blood:10]  I/O this shift:  In: 120 [P.O.:120]  Out: 250 [Urine:250]      Objective:      Physical Exam:   BP (!) 102/59   Pulse 72   Temp (!) 96.3 °F (35.7 °C) (Temporal)   Resp 24   Ht 1.6 m (5' 3\")   Wt 63.8 kg (140 lb 9.6 oz)   SpO2 94%   BMI 24.91 kg/m²   CONSTITUTIONAL:  awake, alert, cooperative, no apparent distress, and appears stated age  HEAD:  normocepalic, without obvious abnormality, atraumatic  NECK:  Supple, symmetrical, trachea  emergent cardiac surgery. Patient verbalized understanding and is agreeable to proceed.  5.  Basic metabolic panel and CBC in a.m.  6.  Further cardiac recommendations will be forthcoming pending his clinical course and diagnostic test findings    Discussed with patient and son at bedside  Discussed with nursing staff  Electronically signed by Raulito Cadena MD on 4/29/2025 at 5:54 PM  NOTE: This report was transcribed using voice recognition software. Every effort was made to ensure accuracy; however, inadvertent computerized transcription errors may be present

## 2025-04-29 NOTE — CARE COORDINATION
Per chart review - here with syncope tadycardia, aortic stenosis Cardiology consult. Heart cath yesterday   cardiothoracic surgery and recommending TAVR as opposed to SAVR given his age and comorbidities. CTS met with patient this am and he made it clear he does not want surgery. Discharge plan per prior cm note- plans to return home with no needs. one of his sons will transport when medically cleared. Electronically signed by Ashely Wen RN on 4/29/2025 at 3:04 PM

## 2025-04-29 NOTE — PLAN OF CARE
Problem: Chronic Conditions and Co-morbidities  Goal: Patient's chronic conditions and co-morbidity symptoms are monitored and maintained or improved  4/29/2025 0208 by Miranda Montilla RN  Outcome: Progressing     Problem: Safety - Adult  Goal: Free from fall injury  4/29/2025 0208 by Miranda Montilla, RN  Outcome: Progressing     Problem: ABCDS Injury Assessment  Goal: Absence of physical injury  Outcome: Progressing     Problem: Pain  Goal: Verbalizes/displays adequate comfort level or baseline comfort level  Outcome: Progressing

## 2025-04-29 NOTE — PROGRESS NOTES
[facility] Medicine    Subjective:  pt alert conversive son and daughter in law at bedside      Current Facility-Administered Medications:     sodium chloride flush 0.9 % injection 10 mL, 10 mL, IntraVENous, Once PRN, Lovely Thompson MD    sodium chloride flush 0.9 % injection 5-40 mL, 5-40 mL, IntraVENous, 2 times per day, Raulito Cadena MD    sodium chloride flush 0.9 % injection 5-40 mL, 5-40 mL, IntraVENous, PRN, Raulito Cadena MD    0.9 % sodium chloride infusion, , IntraVENous, PRN, Raulito Cadena MD    acetaminophen (TYLENOL) tablet 650 mg, 650 mg, Oral, Q4H PRN, Raulito Cadena MD    enoxaparin (LOVENOX) injection 60 mg, 1 mg/kg, SubCUTAneous, BID, Raulito Cadena MD    guaiFENesin tablet 400 mg, 400 mg, Oral, BID PRN, Steven Nava DO, 400 mg at 04/27/25 2059    sodium chloride flush 0.9 % injection 10 mL, 10 mL, IntraVENous, 2 times per day, Nikki Cardoso APRN - CNP, 10 mL at 04/28/25 2037    sodium chloride flush 0.9 % injection 10 mL, 10 mL, IntraVENous, PRN, Nikki Cardoso APRN - CNP    0.9 % sodium chloride infusion, , IntraVENous, PRN, Nikki Cardoso APRN - CNP    ondansetron (ZOFRAN-ODT) disintegrating tablet 4 mg, 4 mg, Oral, Q8H PRN **OR** ondansetron (ZOFRAN) injection 4 mg, 4 mg, IntraVENous, Q6H PRN, Nikki Cardoso APRN - CNP    senna (SENOKOT) tablet 8.6 mg, 1 tablet, Oral, Daily PRN, Nikki Cardoso APRN - CNP    acetaminophen (TYLENOL) tablet 650 mg, 650 mg, Oral, Q6H PRN **OR** acetaminophen (TYLENOL) suppository 650 mg, 650 mg, Rectal, Q6H PRN, Nikki Cardoso APRN - CNP    melatonin tablet 6 mg, 6 mg, Oral, Nightly PRN, Nikki Cardoso APRN - CNP    pantoprazole (PROTONIX) tablet 40 mg, 40 mg, Oral, QAM AC, Nikki Cardoso APRN - CNP, 40 mg at 04/28/25 0627    losartan (COZAAR) tablet 25 mg, 25 mg, Oral, Daily, Nikki Cardoso APRN - CNP, 25 mg at 04/28/25 1024    atorvastatin (LIPITOR) tablet 20 mg, 20 mg, Oral, Nightly, Nikki Cardoso APRN - CNP, 20 mg at 04/28/25

## 2025-04-30 PROBLEM — R09.89 SYMPTOMS INVOLVING CARDIOVASCULAR SYSTEM: Status: ACTIVE | Noted: 2025-04-30

## 2025-04-30 PROBLEM — I71.43 INFRARENAL ABDOMINAL AORTIC ANEURYSM (AAA) WITHOUT RUPTURE: Chronic | Status: ACTIVE | Noted: 2025-04-30

## 2025-04-30 LAB
ACTIVATED CLOTTING TIME, LOW RANGE: 163 SEC
ACTIVATED CLOTTING TIME, LOW RANGE: 189 SEC
ACTIVATED CLOTTING TIME, LOW RANGE: 205 SEC
ACTIVATED CLOTTING TIME, LOW RANGE: 247 SEC
ACTIVATED CLOTTING TIME, LOW RANGE: 283 SEC
ACTIVATED CLOTTING TIME, LOW RANGE: 290 SEC
ACTIVATED CLOTTING TIME, LOW RANGE: 295 SEC
ACTIVATED CLOTTING TIME, LOW RANGE: 320 SEC
ALBUMIN SERPL-MCNC: 3.1 G/DL (ref 3.5–5.2)
ALP SERPL-CCNC: 58 U/L (ref 40–129)
ALT SERPL-CCNC: 33 U/L (ref 0–50)
ANION GAP SERPL CALCULATED.3IONS-SCNC: 12 MMOL/L (ref 7–16)
AST SERPL-CCNC: 55 U/L (ref 0–50)
BASOPHILS # BLD: 0.04 K/UL (ref 0–0.2)
BASOPHILS NFR BLD: 1 % (ref 0–2)
BILIRUB SERPL-MCNC: 0.9 MG/DL (ref 0–1.2)
BUN SERPL-MCNC: 18 MG/DL (ref 8–23)
CALCIUM SERPL-MCNC: 9.1 MG/DL (ref 8.8–10.2)
CHLORIDE SERPL-SCNC: 105 MMOL/L (ref 98–107)
CO2 SERPL-SCNC: 19 MMOL/L (ref 22–29)
CREAT SERPL-MCNC: 1.2 MG/DL (ref 0.7–1.2)
EOSINOPHIL # BLD: 0.2 K/UL (ref 0.05–0.5)
EOSINOPHILS RELATIVE PERCENT: 4 % (ref 0–6)
ERYTHROCYTE [DISTWIDTH] IN BLOOD BY AUTOMATED COUNT: 16.3 % (ref 11.5–15)
GFR, ESTIMATED: 59 ML/MIN/1.73M2
GLUCOSE BLD-MCNC: 112 MG/DL (ref 74–99)
GLUCOSE BLD-MCNC: 96 MG/DL (ref 74–99)
GLUCOSE SERPL-MCNC: 101 MG/DL (ref 74–99)
HCT VFR BLD AUTO: 36.9 % (ref 37–54)
HGB BLD-MCNC: 12 G/DL (ref 12.5–16.5)
IMM GRANULOCYTES # BLD AUTO: <0.03 K/UL (ref 0–0.58)
IMM GRANULOCYTES NFR BLD: 0 % (ref 0–5)
INR PPP: 1.3
LYMPHOCYTES NFR BLD: 1.21 K/UL (ref 1.5–4)
LYMPHOCYTES RELATIVE PERCENT: 27 % (ref 20–42)
MCH RBC QN AUTO: 29.4 PG (ref 26–35)
MCHC RBC AUTO-ENTMCNC: 32.5 G/DL (ref 32–34.5)
MCV RBC AUTO: 90.4 FL (ref 80–99.9)
MONOCYTES NFR BLD: 0.61 K/UL (ref 0.1–0.95)
MONOCYTES NFR BLD: 13 % (ref 2–12)
NEUTROPHILS NFR BLD: 55 % (ref 43–80)
NEUTS SEG NFR BLD: 2.48 K/UL (ref 1.8–7.3)
PLATELET # BLD AUTO: 154 K/UL (ref 130–450)
PMV BLD AUTO: 11.7 FL (ref 7–12)
POTASSIUM SERPL-SCNC: 4.4 MMOL/L (ref 3.5–5.1)
PROT SERPL-MCNC: 6.8 G/DL (ref 6.4–8.3)
PROTHROMBIN TIME: 14.3 SEC (ref 9.3–12.4)
RBC # BLD AUTO: 4.08 M/UL (ref 3.8–5.8)
SODIUM SERPL-SCNC: 135 MMOL/L (ref 136–145)
WBC OTHER # BLD: 4.5 K/UL (ref 4.5–11.5)

## 2025-04-30 PROCEDURE — 80053 COMPREHEN METABOLIC PANEL: CPT

## 2025-04-30 PROCEDURE — 93005 ELECTROCARDIOGRAM TRACING: CPT

## 2025-04-30 PROCEDURE — 6360000004 HC RX CONTRAST MEDICATION: Performed by: INTERNAL MEDICINE

## 2025-04-30 PROCEDURE — 2500000003 HC RX 250 WO HCPCS: Performed by: INTERNAL MEDICINE

## 2025-04-30 PROCEDURE — 94375 RESPIRATORY FLOW VOLUME LOOP: CPT

## 2025-04-30 PROCEDURE — C1769 GUIDE WIRE: HCPCS | Performed by: INTERNAL MEDICINE

## 2025-04-30 PROCEDURE — 92921 HC PRQ CARDIAC ANGIO ADDL ART: CPT | Performed by: INTERNAL MEDICINE

## 2025-04-30 PROCEDURE — C1725 CATH, TRANSLUMIN NON-LASER: HCPCS | Performed by: INTERNAL MEDICINE

## 2025-04-30 PROCEDURE — 92928 PRQ TCAT PLMT NTRAC ST 1 LES: CPT | Performed by: INTERNAL MEDICINE

## 2025-04-30 PROCEDURE — 6370000000 HC RX 637 (ALT 250 FOR IP): Performed by: NURSE PRACTITIONER

## 2025-04-30 PROCEDURE — C1894 INTRO/SHEATH, NON-LASER: HCPCS | Performed by: INTERNAL MEDICINE

## 2025-04-30 PROCEDURE — 6370000000 HC RX 637 (ALT 250 FOR IP): Performed by: INTERNAL MEDICINE

## 2025-04-30 PROCEDURE — 85025 COMPLETE CBC W/AUTO DIFF WBC: CPT

## 2025-04-30 PROCEDURE — 02F03ZZ FRAGMENTATION IN CORONARY ARTERY, ONE ARTERY, PERCUTANEOUS APPROACH: ICD-10-PCS | Performed by: INTERNAL MEDICINE

## 2025-04-30 PROCEDURE — 6360000002 HC RX W HCPCS: Performed by: INTERNAL MEDICINE

## 2025-04-30 PROCEDURE — C1874 STENT, COATED/COV W/DEL SYS: HCPCS | Performed by: INTERNAL MEDICINE

## 2025-04-30 PROCEDURE — 92972 PERQ TRLUML CORONRY LITHOTRP: CPT | Performed by: INTERNAL MEDICINE

## 2025-04-30 PROCEDURE — 2709999900 HC NON-CHARGEABLE SUPPLY: Performed by: INTERNAL MEDICINE

## 2025-04-30 PROCEDURE — 027135Z DILATION OF CORONARY ARTERY, TWO ARTERIES WITH TWO DRUG-ELUTING INTRALUMINAL DEVICES, PERCUTANEOUS APPROACH: ICD-10-PCS | Performed by: INTERNAL MEDICINE

## 2025-04-30 PROCEDURE — 7100000010 HC PHASE II RECOVERY - FIRST 15 MIN: Performed by: INTERNAL MEDICINE

## 2025-04-30 PROCEDURE — 94060 EVALUATION OF WHEEZING: CPT | Performed by: INTERNAL MEDICINE

## 2025-04-30 PROCEDURE — 85347 COAGULATION TIME ACTIVATED: CPT

## 2025-04-30 PROCEDURE — 99223 1ST HOSP IP/OBS HIGH 75: CPT | Performed by: SURGERY

## 2025-04-30 PROCEDURE — 85610 PROTHROMBIN TIME: CPT

## 2025-04-30 PROCEDURE — C1887 CATHETER, GUIDING: HCPCS | Performed by: INTERNAL MEDICINE

## 2025-04-30 PROCEDURE — 2140000000 HC CCU INTERMEDIATE R&B

## 2025-04-30 PROCEDURE — 7100000011 HC PHASE II RECOVERY - ADDTL 15 MIN: Performed by: INTERNAL MEDICINE

## 2025-04-30 PROCEDURE — 2580000003 HC RX 258: Performed by: INTERNAL MEDICINE

## 2025-04-30 PROCEDURE — C1761 HC CATH TRANSLUM INTRAVASCULAR LITHOTRIPSY CORONARY: HCPCS | Performed by: INTERNAL MEDICINE

## 2025-04-30 PROCEDURE — 36415 COLL VENOUS BLD VENIPUNCTURE: CPT

## 2025-04-30 PROCEDURE — 94729 DIFFUSING CAPACITY: CPT

## 2025-04-30 PROCEDURE — 82962 GLUCOSE BLOOD TEST: CPT

## 2025-04-30 PROCEDURE — 94729 DIFFUSING CAPACITY: CPT | Performed by: INTERNAL MEDICINE

## 2025-04-30 DEVICE — STENT ONYXNG25012UX ONYX 2.50X12RX
Type: IMPLANTABLE DEVICE | Status: FUNCTIONAL
Brand: ONYX FRONTIER™

## 2025-04-30 DEVICE — STENT ONYXNG35030UX ONYX 3.50X30RX
Type: IMPLANTABLE DEVICE | Status: FUNCTIONAL
Brand: ONYX FRONTIER™

## 2025-04-30 RX ORDER — SODIUM CHLORIDE 9 MG/ML
INJECTION, SOLUTION INTRAVENOUS CONTINUOUS
Status: ACTIVE | OUTPATIENT
Start: 2025-04-30 | End: 2025-04-30

## 2025-04-30 RX ORDER — ACETAMINOPHEN 325 MG/1
650 TABLET ORAL EVERY 4 HOURS PRN
Status: DISCONTINUED | OUTPATIENT
Start: 2025-04-30 | End: 2025-05-05 | Stop reason: HOSPADM

## 2025-04-30 RX ORDER — MIDAZOLAM HYDROCHLORIDE 1 MG/ML
INJECTION, SOLUTION INTRAMUSCULAR; INTRAVENOUS PRN
Status: DISCONTINUED | OUTPATIENT
Start: 2025-04-30 | End: 2025-04-30 | Stop reason: HOSPADM

## 2025-04-30 RX ORDER — SODIUM CHLORIDE 0.9 % (FLUSH) 0.9 %
5-40 SYRINGE (ML) INJECTION EVERY 12 HOURS SCHEDULED
Status: DISCONTINUED | OUTPATIENT
Start: 2025-04-30 | End: 2025-05-05 | Stop reason: HOSPADM

## 2025-04-30 RX ORDER — HEPARIN SODIUM 10000 [USP'U]/ML
INJECTION, SOLUTION INTRAVENOUS; SUBCUTANEOUS PRN
Status: DISCONTINUED | OUTPATIENT
Start: 2025-04-30 | End: 2025-04-30 | Stop reason: HOSPADM

## 2025-04-30 RX ORDER — IOPAMIDOL 755 MG/ML
INJECTION, SOLUTION INTRAVASCULAR PRN
Status: DISCONTINUED | OUTPATIENT
Start: 2025-04-30 | End: 2025-04-30 | Stop reason: HOSPADM

## 2025-04-30 RX ORDER — SODIUM CHLORIDE 0.9 % (FLUSH) 0.9 %
5-40 SYRINGE (ML) INJECTION PRN
Status: DISCONTINUED | OUTPATIENT
Start: 2025-04-30 | End: 2025-05-05 | Stop reason: HOSPADM

## 2025-04-30 RX ORDER — SODIUM CHLORIDE 9 MG/ML
INJECTION, SOLUTION INTRAVENOUS PRN
Status: DISCONTINUED | OUTPATIENT
Start: 2025-04-30 | End: 2025-05-05 | Stop reason: HOSPADM

## 2025-04-30 RX ORDER — FENTANYL CITRATE 50 UG/ML
INJECTION, SOLUTION INTRAMUSCULAR; INTRAVENOUS PRN
Status: DISCONTINUED | OUTPATIENT
Start: 2025-04-30 | End: 2025-04-30 | Stop reason: HOSPADM

## 2025-04-30 RX ADMIN — SODIUM CHLORIDE: 0.9 INJECTION, SOLUTION INTRAVENOUS at 13:47

## 2025-04-30 RX ADMIN — ATORVASTATIN CALCIUM 20 MG: 20 TABLET, FILM COATED ORAL at 23:10

## 2025-04-30 RX ADMIN — GUAIFENESIN 400 MG: 400 TABLET ORAL at 13:49

## 2025-04-30 RX ADMIN — CLOPIDOGREL BISULFATE 75 MG: 75 TABLET, FILM COATED ORAL at 08:25

## 2025-04-30 RX ADMIN — ASPIRIN 81 MG: 81 TABLET, COATED ORAL at 08:25

## 2025-04-30 NOTE — PROGRESS NOTES
Patient to CVL recovery area post PCI. Patient A&O x 3. VSS. Right radial site soft, without oozing or hematoma. Site ecchymotic. Left groin site soft, without oozing or hematoma. 4fr sheath intact. Dressing clean and dry.

## 2025-04-30 NOTE — PROGRESS NOTES
Attempted to send for patient. Per RN, patient unable to come down at this time. RN will call department today if patient able to come. If not, will try again tomorrow 5/1.

## 2025-04-30 NOTE — PROCEDURES
[facility]              [address] [place] [address]                           PULMONARY FUNCTION      PATIENT NAME: ALYSHA JOSUE        : 1940  MED REC NO: [id number]                        ROOM: [facility]  ACCOUNT NO: [id number]                       ADMIT DATE: 2025  PROVIDER: Chandrakant Vivas MD      DATE OF PROCEDURE: 2025    SURGEON:  Chandrakant Vivas MD    REFERRING PHYSICIAN:  SHAMIR SAAB    The spirometry shows minimal reduction in the FVC and mild reduction in the FEV1.  The FEV1/FVC is normal.  The maximum voluntary ventilation is 64% of the predicted value.    According to Z-score criteria, FVC is under the area of curve, within standard deviation of -1.64, whereas the FEV1 is outside the area of curve, more than standard deviation of -1.64.  The FEV1/FVC is under the area of curve, within standard deviation of -1.64.    The diffusion capacity is moderately decreased.    IMPRESSION:  The above study shows mild restrictive ventilatory impairment.          CHANDRAKANT VIVAS MD      D:  2025 08:25:43     T:  2025 08:42:17     MARITZA/CHELSEA  Job #:  [id number]     Doc#:  [id number]

## 2025-04-30 NOTE — PATIENT CARE CONFERENCE
[facility] Quality Flow/Interdisciplinary Rounds Progress Note        Quality Flow Rounds held on April 30, 2025    Disciplines Attending:  Bedside Nurse, , , and Nursing Unit Leadership    Trace B Patris was admitted on 4/25/2025 10:56 PM    Anticipated Discharge Date:       Disposition:    Vidal Score:  Vidal Scale Score: 21    [facility] RISK OF UNPLANNED READMISSION 2.0             12.7 Total Score        Discussed patient goal for the day, patient clinical progression, and barriers to discharge.  The following Goal(s) of the Day/Commitment(s) have been identified:  Repot labs/diagnostics      Paty Lyons RN  April 30, 2025

## 2025-04-30 NOTE — PLAN OF CARE
Problem: Chronic Conditions and Co-morbidities  Goal: Patient's chronic conditions and co-morbidity symptoms are monitored and maintained or improved  4/29/2025 2357 by Miranda Montilla, RN  Outcome: Progressing     Problem: Safety - Adult  Goal: Free from fall injury  4/29/2025 2357 by Miranda Montilla, RN  Outcome: Progressing     Problem: ABCDS Injury Assessment  Goal: Absence of physical injury  Outcome: Progressing

## 2025-04-30 NOTE — PROGRESS NOTES
Patient transferred to [facility]. Patient A&O x 3. VSS. Right radial site soft, ecchymotic, without oozing or hematoma. Left groin site soft, without oozing or hematoma, sheath intact. Dressing clean and dry. Patient transferred with monitor on, and family at bedside.

## 2025-04-30 NOTE — PROGRESS NOTES
[facility] Medicine    Subjective:  pt alert conversive ct abd + AAA      Current Facility-Administered Medications:     sodium chloride flush 0.9 % injection 10 mL, 10 mL, IntraVENous, Once PRN, Jay, Lovely Guerra MD    clopidogrel (PLAVIX) tablet 75 mg, 75 mg, Oral, Daily, Raulito Cadena MD    aspirin EC tablet 81 mg, 81 mg, Oral, Daily, Raulito Cadena MD    sodium chloride flush 0.9 % injection 5-40 mL, 5-40 mL, IntraVENous, 2 times per day, Raulito Cadena MD, 10 mL at 04/29/25 0923    sodium chloride flush 0.9 % injection 5-40 mL, 5-40 mL, IntraVENous, PRN, Raulito Cadena MD    0.9 % sodium chloride infusion, , IntraVENous, PRN, Raulito Cadena MD    acetaminophen (TYLENOL) tablet 650 mg, 650 mg, Oral, Q4H PRN, Raulito Cadena MD    guaiFENesin tablet 400 mg, 400 mg, Oral, BID PRN, Steven Nava DO, 400 mg at 04/27/25 2059    sodium chloride flush 0.9 % injection 10 mL, 10 mL, IntraVENous, 2 times per day, Nikki Cardoso APRN - CNP, 10 mL at 04/29/25 2052    sodium chloride flush 0.9 % injection 10 mL, 10 mL, IntraVENous, PRN, Nikki Cardoso APRN - CNP    0.9 % sodium chloride infusion, , IntraVENous, PRN, Nikki Cardoso APRN - CNP    ondansetron (ZOFRAN-ODT) disintegrating tablet 4 mg, 4 mg, Oral, Q8H PRN **OR** ondansetron (ZOFRAN) injection 4 mg, 4 mg, IntraVENous, Q6H PRN, Nikki Cardoso APRN - CNP    senna (SENOKOT) tablet 8.6 mg, 1 tablet, Oral, Daily PRN, Nikki Cardoso APRN - CNP    acetaminophen (TYLENOL) tablet 650 mg, 650 mg, Oral, Q6H PRN **OR** acetaminophen (TYLENOL) suppository 650 mg, 650 mg, Rectal, Q6H PRN, Nikki Cardoso APRN - CNP    melatonin tablet 6 mg, 6 mg, Oral, Nightly PRN, Nikki Cardoso APRN - CNP    pantoprazole (PROTONIX) tablet 40 mg, 40 mg, Oral, QAM ACWalker Amanda R, APRN - CNP, 40 mg at 04/28/25 0627    [Held by provider] losartan (COZAAR) tablet 25 mg, 25 mg, Oral, Daily, Nikki Cardoso APRN - CNP, 25 mg at 04/29/25 0922    atorvastatin (LIPITOR) tablet  20 mg, 20 mg, Oral, Nightly, Nikki Cardoso APRN - CNP, 20 mg at 04/29/25 2052    glucose chewable tablet 16 g, 4 tablet, Oral, PRN, Nikki Cardoso APRN - CNP    dextrose bolus 10% 125 mL, 125 mL, IntraVENous, PRN **OR** dextrose bolus 10% 250 mL, 250 mL, IntraVENous, PRN, Nikki Cardoso APRN - CNP    glucagon injection 1 mg, 1 mg, SubCUTAneous, PRN, Nikki Cardoso APRN - CNP    dextrose 10 % infusion, , IntraVENous, Continuous PRN, Nikki Cardoso APRN - CNP    insulin lispro (HUMALOG,ADMELOG) injection vial 0-4 Units, 0-4 Units, SubCUTAneous, 4x Daily AC & HS, Nikki Cardoso APRN - CNP    Objective:    BP (!) 146/80   Pulse 74   Temp 96.8 °F (36 °C) (Temporal)   Resp 22   Ht 1.6 m (5' 3\")   Wt 64.1 kg (141 lb 6.4 oz)   SpO2 96%   BMI 25.05 kg/m²     Heart:  irreg  Lungs:  ctab  Abd: + bs soft nontender  Extrem:  w/o edema    CBC with Differential:    Lab Results   Component Value Date/Time    WBC 4.5 04/30/2025 05:36 AM    RBC 4.08 04/30/2025 05:36 AM    HGB 12.0 04/30/2025 05:36 AM    HCT 36.9 04/30/2025 05:36 AM     04/30/2025 05:36 AM    MCV 90.4 04/30/2025 05:36 AM    MCH 29.4 04/30/2025 05:36 AM    MCHC 32.5 04/30/2025 05:36 AM    RDW 16.3 04/30/2025 05:36 AM    LYMPHOPCT 27 04/30/2025 05:36 AM    MONOPCT 13 04/30/2025 05:36 AM    EOSPCT 4 04/30/2025 05:36 AM    BASOPCT 1 04/30/2025 05:36 AM    MONOSABS 0.61 04/30/2025 05:36 AM    LYMPHSABS 1.21 04/30/2025 05:36 AM    EOSABS 0.20 04/30/2025 05:36 AM    BASOSABS 0.04 04/30/2025 05:36 AM     CMP:    Lab Results   Component Value Date/Time     04/29/2025 05:48 AM    K 4.3 04/29/2025 05:48 AM    K 4.0 11/25/2022 10:10 AM     04/29/2025 05:48 AM    CO2 19 04/29/2025 05:48 AM    BUN 15 04/29/2025 05:48 AM    CREATININE 1.2 04/29/2025 05:48 AM    GFRAA 59 05/26/2019 09:24 AM    LABGLOM 62 04/29/2025 05:48 AM    LABGLOM 55 11/26/2022 01:17 PM    GLUCOSE 98 04/29/2025 05:48 AM    CALCIUM 9.3 04/29/2025 05:48 AM    BILITOT 1.1

## 2025-04-30 NOTE — PROGRESS NOTES
Report called to receiving RN on 63. Pt being transferred to room 6318 from cath lab when appropriate. All Pt personal belongings delivered to room 6318 including phone and phone .

## 2025-04-30 NOTE — PROGRESS NOTES
Report given to CSU.   Detail Level: Detailed Size Of Lesion In Cm (Optional): 0.2 X Size Of Lesion In Cm (Optional): 0.6 Morphology Per Location (Optional): Proximal edge 2 mm from cuticle

## 2025-04-30 NOTE — PROGRESS NOTES
4 Eyes Skin Assessment     NAME:  Trace Nassar  YOB: 1940  MEDICAL RECORD NUMBER:  [id number]    The patient is being assessed for  Transfer to New Unit    I agree that at least one RN has performed a thorough Head to Toe Skin Assessment on the patient. ALL assessment sites listed below have been assessed.      Areas assessed by both nurses:    Head, Face, Ears, Shoulders, Back, Chest, Arms, Elbows, Hands, Sacrum. Buttock, Coccyx, Ischium, Legs. Feet and Heels, and Under Medical Devices         Does the Patient have a Wound? No noted wound(s)       Vidal Prevention initiated by RN: No  Wound Care Orders initiated by RN: No    Pressure Injury (Stage 3,4, Unstageable, DTI, NWPT, and Complex wounds) if present, place Wound referral order by RN under : No    New Ostomies, if present place, Ostomy referral order under : No     Nurse 1 eSignature: Electronically signed by David Cool RN on 4/30/25 at 6:16 PM EDT    **SHARE this note so that the co-signing nurse can place an eSignature**    Nurse 2 eSignature: Electronically signed by Cortney Gutierrez RN on 4/30/25 at 6:18 PM EDT

## 2025-04-30 NOTE — CARE COORDINATION
Per chart review - here with syncope tadycardia, aortic stenosis  patient is A &O X4 ct abd + AAA vascular and Cardiology consult. For heart cath today Discharge plan prior to above - to return home with no needs. one of his sons will transport when medically cleared. Await PT/OT Electronically signed by Ashely Wen RN on 4/30/2025 at 1:28 PM
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
## 2025-04-30 NOTE — CONSULTS
Vascular Surgery Inpatient Consultation Note      Reason for Consultation: Abdominal aortic aneurysm    HISTORY OF PRESENT ILLNESS:                The patient is a 85 y.o. male who is admitted to the hospital for treatment of syncope.  The chart was reviewed.  He was syncopal while at a store.  As part of his workup, an echo identified severe aortic stenosis.  He is being considered for TAVR and a CAT scan of the abdomen and pelvis was performed that incidentally noted a large abdominal aortic aneurysm.  He underwent cardiac catheterization today with PCI.  Vascular surgery is consulted for evaluation and treatment.    IMPRESSION: Asymptomatic 6.9 cm abdominal aortic aneurysm.    I reviewed the natural history and treatment options of aortic aneurysms with the patient and family members who were present at bedside.  The aneurysm would be amenable to endovascular repair.  From my standpoint, he is cleared for TAVR.    The patient states that he is 85 years old and does not want to have any additional surgery, including TAVR and aneurysm surgery.  I reviewed the risk of rupture for an untreated aortic aneurysm.  His family was present during this discussion.  I asked him to discuss this with his cardiologist and thoracic surgeon.      Patient Active Problem List   Diagnosis Code    Atrial fibrillation with slow ventricular response (HCC) I48.91    Hyperlipidemia with target LDL less than 100 E78.5    History of CVA (cerebrovascular accident) Z86.73    CAD (coronary artery disease), native coronary artery I25.10    Mitral insufficiency I34.0    Carotid stenosis, asymptomatic, bilateral I65.23    Anemia D64.9    Nonrheumatic aortic valve stenosis I35.0    Heart failure with mid-range ejection fraction (HFmEF) ([facility]) I50.22    Syncope R55    Bradycardia R00.1    Severe aortic stenosis I35.0    Permanent atrial fibrillation (HCC) I48.21    Symptoms involving cardiovascular system R09.89    Infrarenal abdominal aortic  pantoprazole  40 mg Oral QAM AC    losartan  25 mg Oral Daily    atorvastatin  20 mg Oral Nightly    insulin lispro  0-4 Units SubCUTAneous 4x Daily AC & HS        Allergies:  Penicillins and Sulfa antibiotics    Social History     Socioeconomic History    Marital status:      Spouse name: Not on file    Number of children: Not on file    Years of education: Not on file    Highest education level: Not on file   Occupational History    Not on file   Tobacco Use    Smoking status: Former     Current packs/day: 0.00     Average packs/day: 0.3 packs/day for 45.5 years (11.4 ttl pk-yrs)     Types: Cigarettes     Start date: 1955     Quit date: 2001     Years since quittin.2    Smokeless tobacco: Never   Vaping Use    Vaping status: Never Used   Substance and Sexual Activity    Alcohol use: Yes     Comment: glass of wine occasionally    Drug use: No    Sexual activity: Never   Other Topics Concern    Not on file   Social History Narrative    ** Merged History Encounter **          Social Drivers of Health     Financial Resource Strain: Not on file   Food Insecurity: No Food Insecurity (2025)    Hunger Vital Sign     Worried About Running Out of Food in the Last Year: Never true     Ran Out of Food in the Last Year: Never true   Transportation Needs: No Transportation Needs (2025)    PRAPARE - Transportation     Lack of Transportation (Medical): No     Lack of Transportation (Non-Medical): No   Physical Activity: Not on file   Stress: Not on file   Social Connections: Not on file   Intimate Partner Violence: Not on file   Housing Stability: Low Risk  (2025)    Housing Stability Vital Sign     Unable to Pay for Housing in the Last Year: No     Number of Times Moved in the Last Year: 0     Homeless in the Last Year: No        Family History   Problem Relation Age of Onset    Heart Disease Father     Cancer Sister     Heart Disease Sister     Heart Disease Brother     Heart Disease Sister

## 2025-04-30 NOTE — NURSE NAVIGATOR
Called to assess radial vasc band site. Vasc band is intact, no bleeding noted. Strong pulse noted above vasc band. Hand is slightly discolored but patient's sensation is intact. Instructed patient to notify his primary nurse if he begins to have pain in his hand. ANA Brady was a bedside and updated.     KOSTA Ceja updated.    Lilli James RN  CATH/PCI/STEMI Navigator

## 2025-05-01 ENCOUNTER — APPOINTMENT (OUTPATIENT)
Dept: ULTRASOUND IMAGING | Age: 85
DRG: 324 | End: 2025-05-01
Attending: INTERNAL MEDICINE
Payer: MEDICARE

## 2025-05-01 LAB
ALBUMIN SERPL-MCNC: 2.8 G/DL (ref 3.5–5.2)
ALP SERPL-CCNC: 53 U/L (ref 40–129)
ALT SERPL-CCNC: 33 U/L (ref 0–50)
ANION GAP SERPL CALCULATED.3IONS-SCNC: 14 MMOL/L (ref 7–16)
AST SERPL-CCNC: 62 U/L (ref 0–50)
BASOPHILS # BLD: 0.05 K/UL (ref 0–0.2)
BASOPHILS NFR BLD: 1 % (ref 0–2)
BILIRUB SERPL-MCNC: 0.7 MG/DL (ref 0–1.2)
BUN SERPL-MCNC: 20 MG/DL (ref 8–23)
CALCIUM SERPL-MCNC: 9 MG/DL (ref 8.8–10.2)
CHLORIDE SERPL-SCNC: 104 MMOL/L (ref 98–107)
CO2 SERPL-SCNC: 15 MMOL/L (ref 22–29)
CREAT SERPL-MCNC: 1.2 MG/DL (ref 0.7–1.2)
EOSINOPHIL # BLD: 0.1 K/UL (ref 0.05–0.5)
EOSINOPHILS RELATIVE PERCENT: 2 % (ref 0–6)
ERYTHROCYTE [DISTWIDTH] IN BLOOD BY AUTOMATED COUNT: 16.4 % (ref 11.5–15)
GFR, ESTIMATED: 59 ML/MIN/1.73M2
GLUCOSE BLD-MCNC: 129 MG/DL (ref 74–99)
GLUCOSE BLD-MCNC: 132 MG/DL (ref 74–99)
GLUCOSE BLD-MCNC: 155 MG/DL (ref 74–99)
GLUCOSE BLD-MCNC: 164 MG/DL (ref 74–99)
GLUCOSE SERPL-MCNC: 108 MG/DL (ref 74–99)
HCT VFR BLD AUTO: 33.9 % (ref 37–54)
HGB BLD-MCNC: 10.5 G/DL (ref 12.5–16.5)
IMM GRANULOCYTES # BLD AUTO: <0.03 K/UL (ref 0–0.58)
IMM GRANULOCYTES NFR BLD: 0 % (ref 0–5)
INR PPP: 1.2
LYMPHOCYTES NFR BLD: 0.91 K/UL (ref 1.5–4)
LYMPHOCYTES RELATIVE PERCENT: 16 % (ref 20–42)
MCH RBC QN AUTO: 29.9 PG (ref 26–35)
MCHC RBC AUTO-ENTMCNC: 31 G/DL (ref 32–34.5)
MCV RBC AUTO: 96.6 FL (ref 80–99.9)
MONOCYTES NFR BLD: 0.65 K/UL (ref 0.1–0.95)
MONOCYTES NFR BLD: 11 % (ref 2–12)
NEUTROPHILS NFR BLD: 70 % (ref 43–80)
NEUTS SEG NFR BLD: 4.05 K/UL (ref 1.8–7.3)
PLATELET # BLD AUTO: 147 K/UL (ref 130–450)
PMV BLD AUTO: 12.5 FL (ref 7–12)
POTASSIUM SERPL-SCNC: 5.1 MMOL/L (ref 3.5–5.1)
PROT SERPL-MCNC: 6.6 G/DL (ref 6.4–8.3)
PROTHROMBIN TIME: 13.2 SEC (ref 9.3–12.4)
RBC # BLD AUTO: 3.51 M/UL (ref 3.8–5.8)
SODIUM SERPL-SCNC: 134 MMOL/L (ref 136–145)
WBC OTHER # BLD: 5.8 K/UL (ref 4.5–11.5)

## 2025-05-01 PROCEDURE — 93880 EXTRACRANIAL BILAT STUDY: CPT

## 2025-05-01 PROCEDURE — 85610 PROTHROMBIN TIME: CPT

## 2025-05-01 PROCEDURE — 97165 OT EVAL LOW COMPLEX 30 MIN: CPT

## 2025-05-01 PROCEDURE — 82962 GLUCOSE BLOOD TEST: CPT

## 2025-05-01 PROCEDURE — 2500000003 HC RX 250 WO HCPCS: Performed by: INTERNAL MEDICINE

## 2025-05-01 PROCEDURE — 36415 COLL VENOUS BLD VENIPUNCTURE: CPT

## 2025-05-01 PROCEDURE — 97530 THERAPEUTIC ACTIVITIES: CPT

## 2025-05-01 PROCEDURE — 2140000000 HC CCU INTERMEDIATE R&B

## 2025-05-01 PROCEDURE — 6370000000 HC RX 637 (ALT 250 FOR IP): Performed by: INTERNAL MEDICINE

## 2025-05-01 PROCEDURE — 85025 COMPLETE CBC W/AUTO DIFF WBC: CPT

## 2025-05-01 PROCEDURE — 6370000000 HC RX 637 (ALT 250 FOR IP): Performed by: NURSE PRACTITIONER

## 2025-05-01 PROCEDURE — 80053 COMPREHEN METABOLIC PANEL: CPT

## 2025-05-01 PROCEDURE — 2500000003 HC RX 250 WO HCPCS: Performed by: NURSE PRACTITIONER

## 2025-05-01 PROCEDURE — 97161 PT EVAL LOW COMPLEX 20 MIN: CPT

## 2025-05-01 PROCEDURE — 2580000003 HC RX 258: Performed by: INTERNAL MEDICINE

## 2025-05-01 PROCEDURE — 99232 SBSQ HOSP IP/OBS MODERATE 35: CPT | Performed by: INTERNAL MEDICINE

## 2025-05-01 RX ORDER — SODIUM CHLORIDE 9 MG/ML
INJECTION, SOLUTION INTRAVENOUS CONTINUOUS
Status: ACTIVE | OUTPATIENT
Start: 2025-05-01 | End: 2025-05-01

## 2025-05-01 RX ADMIN — PANTOPRAZOLE SODIUM 40 MG: 40 TABLET, DELAYED RELEASE ORAL at 06:09

## 2025-05-01 RX ADMIN — SODIUM CHLORIDE, PRESERVATIVE FREE 10 ML: 5 INJECTION INTRAVENOUS at 12:03

## 2025-05-01 RX ADMIN — SODIUM CHLORIDE: 0.9 INJECTION, SOLUTION INTRAVENOUS at 12:51

## 2025-05-01 RX ADMIN — ATORVASTATIN CALCIUM 20 MG: 20 TABLET, FILM COATED ORAL at 20:11

## 2025-05-01 RX ADMIN — LOSARTAN POTASSIUM 25 MG: 25 TABLET, FILM COATED ORAL at 12:01

## 2025-05-01 RX ADMIN — CLOPIDOGREL BISULFATE 75 MG: 75 TABLET, FILM COATED ORAL at 12:01

## 2025-05-01 RX ADMIN — SODIUM CHLORIDE, PRESERVATIVE FREE 10 ML: 5 INJECTION INTRAVENOUS at 20:12

## 2025-05-01 RX ADMIN — SODIUM CHLORIDE, PRESERVATIVE FREE 10 ML: 5 INJECTION INTRAVENOUS at 21:33

## 2025-05-01 RX ADMIN — ASPIRIN 81 MG: 81 TABLET, COATED ORAL at 12:01

## 2025-05-01 NOTE — PROGRESS NOTES
Patient belongings dropped off to [facility] Atwo jackets, shoes, pants, shirts, credit card, money, cell phone, , shaver, polygrip

## 2025-05-01 NOTE — CARE COORDINATION
Transition of Care Update:   S/p Heart Cath with 2 Stents 4/30.  Severe Aortic Stenosis.  Vascular consulted due 6.9 cm AAA, cleared for TAVR.  Pt declines TAVR & Aneurysm surgery at this time.  On RA.  Met with pt in room, re-introduced role of CM & discussed discharge plans.  PT/OT evals pending.  Pt's preferred discharge plan is Home.  He declines HHC.  One of his sons will provide transportation at discharge.  CM/SW to follow.       10:25  Spoke with Elsa in PT/OT office to request PT/OT evals today.

## 2025-05-01 NOTE — DISCHARGE INSTRUCTIONS
Cardiac Rehabilitation: Discharge instructions        Cardiac rehabilitation is a program for people who have a heart problem, such as a heart attack, coronary stent placed, heart failure, or a heart valve disease. The program includes exercise, lifestyle changes, education, and emotional support. Cardiac rehab can help you improve the quality of your life through better overall health. It can help you lose weight and feel better about yourself.    On your cardiac rehab team, you may have your doctor, a nurse specialist, an exercise physiologist, and a dietitian. They will design your cardiac rehab program specifically for you. You will learn how to reduce your risk for heart problems, how to manage stress, and how to eat a heart-healthy diet. By the end of the program, you will be ready to maintain a healthier lifestyle on your own.    Follow-up care is a key part of your treatment and safety. Be sure to make and go to all appointments, and call your doctor if you are having problems. It's also a good idea to know your test results and keep a list of the medicines you take.    Please call to schedule your first appointment once you have been cleared by your cardiologist to attend Phase II Outpatient Cardiac Rehabilitation.       Cardiac Rehabilitation Options:  [facility] Cardiac Rehab             [facility]  [address]                                                                                          Cardiology Services  [place], [place] [address]                                                                              [address]  P- [phone]                                                                                         [place] [address]  Hours: M/W/F 7am-7pm & T/Th 7am-12pm                                                  [phone]                                                                                                                           [phone]  [facility]  Cardiac Rehab  [address], [place]                                                                                                 [facility]  P- [phone]                                                                                         Cardiac Rehabilitation  Hours: M/W/F 7am-6pm                                                                                [address] [facility] [place] [address]  [facility]                                                          [phone]  Cardiac Rehabilitation                                                                                   [phone]  [address] [place] [address]                                                                                        [facility]  [phone]                                                                                          Cardiac Rehabilitation  [phone]                                                                                          [address]                                                                                                                        [place] [address]                                                                                                                        [phone]                                                                                                                        [phone]

## 2025-05-01 NOTE — PROGRESS NOTES
Occupational Therapy  OCCUPATIONAL THERAPY INITIAL EVALUATION    [facility]  [address], [place]      Date:2025                                                Patient Name: Trace Nassar  MRN: [id number]  : 1940  Room: [address] [place] [address]-    Evaluating OT: Bryan Marti OTR/L #[id number]     Referring Provider: Steven Nava DO   Specific Provider Orders/Date: OT eval and treat 25    Diagnosis: Aortic stenosis [I35.0]  Severe aortic stenosis [I35.0]   Pt admitted to hospital with syncope and tachycardia     Surgery / Procedure: heart cath 25 and 25     Pertinent Medical History:  has a past medical history of A-fib ([facility]), A-fib ([facility]), Arthritis, CAD (coronary artery disease), CHF (congestive heart failure) ([facility]), COPD with acute exacerbation ([facility]), Diastolic heart failure ([facility]), Hyperlipidemia, Hypertension, Infrarenal abdominal aortic aneurysm (AAA) without rupture, and Unspecified cerebral artery occlusion with cerebral infarction.       Precautions:  Fall Risk, orthostatic hypotension, bed alarm     Assessment of current deficits    [x] Functional mobility  [x]ADLs  [x] Strength               []Cognition    [x] Functional transfers   [x] IADLs         [x] Safety Awareness   [x]Endurance    [] Fine Coordination              [x] Balance      [] Vision/perception   []Sensation     []Gross Motor Coordination  [] ROM  [] Delirium                   [] Motor Control     OT PLAN OF CARE   OT POC based on physician orders, patient diagnosis and results of clinical assessment    Frequency/Duration 1-5 days/wk for 2 weeks PRN   Specific OT Treatment Interventions to include:   * Instruction/training on adapted ADL techniques and AE recommendations to increase functional independence within precautions       * Training on energy conservation strategies, correct breathing pattern and techniques to improve independence/tolerance for  conservation techniques and safety.  Therapist facilitated self-care retraining: UB/LB self-care tasks (robe and socks) and grooming tasks while cuing on modified techniques, posture, safety and energy conservation techniques. Skilled monitoring of BP, HR, O2 sats and pts response to treatment.        Rehab Potential:  Good for established goals     Patient / Family Goal: Regain Summers     Patient and/or family were instructed on functional diagnosis, prognosis/goals and OT plan of care. Demonstrated fair understanding.     Eval Complexity: Low    Time In: 1055  Time Out: 1120  Total Treatment Time: 10 minutes    Min Units   OT Eval Low 97165  x  1   OT Eval Medium 61317      OT Eval High 85684      OT Re-Eval 66459       Therapeutic Ex 68125       Therapeutic Activities 46471  8  1   ADL/Self Care 21929  2     Orthotic Management 71263       Manual 56229     Neuro Re-Ed 11594       Non-Billable Time          Evaluation Time additionally includes thorough review of current medical information, gathering information on past medical history/social history and prior level of function, interpretation of standardized testing/informal observation of tasks, assessment of data and development of plan of care and goals.          Bryan Marti OTR/L #[id number]

## 2025-05-01 NOTE — PLAN OF CARE
Problem: Chronic Conditions and Co-morbidities  Goal: Patient's chronic conditions and co-morbidity symptoms are monitored and maintained or improved  5/1/2025 1547 by Karoline Tineo RN  Outcome: Progressing  Flowsheets  Taken 5/1/2025 1543  Care Plan - Patient's Chronic Conditions and Co-Morbidity Symptoms are Monitored and Maintained or Improved: Monitor and assess patient's chronic conditions and comorbid symptoms for stability, deterioration, or improvement  Taken 5/1/2025 0737  Care Plan - Patient's Chronic Conditions and Co-Morbidity Symptoms are Monitored and Maintained or Improved: Monitor and assess patient's chronic conditions and comorbid symptoms for stability, deterioration, or improvement  5/1/2025 0204 by Sarah Vanegas RN  Outcome: Progressing  Flowsheets (Taken 4/30/2025 1950)  Care Plan - Patient's Chronic Conditions and Co-Morbidity Symptoms are Monitored and Maintained or Improved: Monitor and assess patient's chronic conditions and comorbid symptoms for stability, deterioration, or improvement     Problem: Discharge Planning  Goal: Discharge to home or other facility with appropriate resources  5/1/2025 1547 by Karoline Tineo RN  Outcome: Progressing  Flowsheets  Taken 5/1/2025 1543  Discharge to home or other facility with appropriate resources: Identify barriers to discharge with patient and caregiver  Taken 5/1/2025 0737  Discharge to home or other facility with appropriate resources: Identify barriers to discharge with patient and caregiver  5/1/2025 0204 by Sarah Vanegas RN  Outcome: Progressing  Flowsheets (Taken 4/30/2025 1950)  Discharge to home or other facility with appropriate resources:   Identify barriers to discharge with patient and caregiver   Arrange for needed discharge resources and transportation as appropriate   Identify discharge learning needs (meds, wound care, etc)     Problem: Safety - Adult  Goal: Free from fall injury  5/1/2025 1547 by Rivka  ANA Ramey  Outcome: Progressing  Flowsheets (Taken 5/1/2025 1546)  Free From Fall Injury: Instruct family/caregiver on patient safety  5/1/2025 0204 by Sarah Vanegas RN  Outcome: Progressing     Problem: ABCDS Injury Assessment  Goal: Absence of physical injury  5/1/2025 1547 by Karoline Tineo RN  Outcome: Progressing  Flowsheets (Taken 5/1/2025 1546)  Absence of Physical Injury: Implement safety measures based on patient assessment  5/1/2025 0204 by Sarah Vanegas RN  Outcome: Progressing

## 2025-05-01 NOTE — PROGRESS NOTES
Physical Therapy  Physical Therapy Initial Assessment     Name: Trace Nassar  : 1940  MRN: [id number]      Date of Service: 2025    Evaluating PT:  Eran Zhang PT, DPT    Room #:  6318/6318-A  Diagnosis:  Aortic stenosis [I35.0]  Severe aortic stenosis [I35.0]  PMHx/PSHx: A-fib ([facility]), Arthritis, CAD (coronary artery disease), CHF (congestive heart failure) ([facility]), COPD with acute exacerbation ([facility]), Diastolic heart failure ([facility]), Hyperlipidemia, Hypertension, and Unspecified cerebral artery occlusion with cerebral infarction. Coronary angioplasty with stent (2014); Coronary angioplasty with stent (2014); Cardioversion (2014); Colonoscopy; hernia repair; Abdomen surgery; eye surgery; Appendectomy; and Carotid endarterectomy (Left, 2015).   Procedure/Surgery:  L heart cath with PCI   Precautions: fall risk, + orthos, +alarms   Equipment Owned: None  Equipment Needs:  TBD    SUBJECTIVE:    Pt lives with alone in a bi-level home with one small step to enter. Bedroom is on the upper level with 5 stairs and 2 rails to reach. Bathroom is on the lower level with 5 stairs and 1 rail to reach.  Pt ambulated with no AD PTA.    OBJECTIVE:   Initial Evaluation  Date: 25 Treatment Short Term/ Long Term   Goals   AM-PAC 6 Clicks      Was pt agreeable to Eval/treatment? yes     Does pt have pain? No complaints     Bed Mobility  Rolling: NT  Supine to sit: SBA  Sit to supine: Min A   Scooting: SBA  Rolling: Independent  Supine to sit: Independent  Sit to supine: Independent  Scooting: Independent   Transfers Sit to stand: Min A  Stand to sit: Min A  Stand pivot: NT    Sit to stand: Independent  Stand to sit: Independent  Stand pivot: Modified independent with AAD   Ambulation   NT    Ambulation deferred at this time d/t +orthos  200 feet with AAD modified independent   Stair negotiation: ascended and descended  NT  5 steps with 1 handrail independently      Strength/ROM:   BLE

## 2025-05-01 NOTE — PROGRESS NOTES
Right radial dressing changed, site soft, stable no bleeding or hematoma. Slight bruising noted. Band aid applied. 2+ radial pulse. Left groin puncture site dressing changed. Bandaid applied. Site soft, stable, no bleeding or hematoma. Bruising noted. 2+ pedal pulse.

## 2025-05-01 NOTE — PLAN OF CARE
Problem: Chronic Conditions and Co-morbidities  Goal: Patient's chronic conditions and co-morbidity symptoms are monitored and maintained or improved  Outcome: Progressing  Flowsheets (Taken 4/30/2025 1950)  Care Plan - Patient's Chronic Conditions and Co-Morbidity Symptoms are Monitored and Maintained or Improved: Monitor and assess patient's chronic conditions and comorbid symptoms for stability, deterioration, or improvement     Problem: Discharge Planning  Goal: Discharge to home or other facility with appropriate resources  Outcome: Progressing  Flowsheets (Taken 4/30/2025 1950)  Discharge to home or other facility with appropriate resources:   Identify barriers to discharge with patient and caregiver   Arrange for needed discharge resources and transportation as appropriate   Identify discharge learning needs (meds, wound care, etc)     Problem: Safety - Adult  Goal: Free from fall injury  Outcome: Progressing     Problem: ABCDS Injury Assessment  Goal: Absence of physical injury  Outcome: Progressing     Problem: Pain  Goal: Verbalizes/displays adequate comfort level or baseline comfort level  Outcome: Progressing

## 2025-05-01 NOTE — PROGRESS NOTES
+ orthos, pt was symptomatic.     layin/60   sittin/51  hr 130  standin/53 hr 82    MARTHA Chandra notified

## 2025-05-01 NOTE — CONSULTS
Met with patient and discussed that their physician has ordered a referral to our outpatient Phase II Cardiac Rehabilitation program. Reviewed the benefits of cardiac rehabilitation based on their diagnosis and personal risk factors. Patient demonstrates mild interest in Cardiac Rehabilitation at this time.  Cardiac Rehabilitation brochure provided to patient/family. The Cardiac Rehabilitation Program has been provided the patient's referral information and pertinent patient details and history. The patient may call [facility] Cardiac Rehabilitation at [phone] for additional information or questions. Contact information for [facility] Cardiac Rehabilitation and other choices close to the patient's residence have been provided in the discharge instructions so that the patient may call and schedule an appointment when cleared by their physician. Thank you for the referral.

## 2025-05-01 NOTE — PROGRESS NOTES
05/26/2019 09:24 AM    LABGLOM 59 04/30/2025 05:36 AM    LABGLOM 55 11/26/2022 01:17 PM    GLUCOSE 101 04/30/2025 05:36 AM    CALCIUM 9.1 04/30/2025 05:36 AM    BILITOT 0.9 04/30/2025 05:36 AM    ALKPHOS 58 04/30/2025 05:36 AM    AST 55 04/30/2025 05:36 AM    ALT 33 04/30/2025 05:36 AM     Warfarin PT/INR:    Lab Results   Component Value Date    INR 1.2 05/01/2025    INR 1.3 04/30/2025    INR 1.4 04/29/2025    PROTIME 13.2 (H) 05/01/2025    PROTIME 14.3 (H) 04/30/2025    PROTIME 15.0 (H) 04/29/2025       Assessment:    Principal Problem:    Severe aortic stenosis  Active Problems:    Atrial fibrillation with slow ventricular response (HCC)    Syncope    Permanent atrial fibrillation (HCC)    Symptoms involving cardiovascular system    Infrarenal abdominal aortic aneurysm (AAA) without rupture  Resolved Problems:    * No resolved hospital problems. *  CAD s/p heart cath/stent x 2    Plan:  Dc planning resume oac pt would consider eliquis if no contraindication will await rec re oac and asa and plavix from cardiology / pt/ot landon Nava DO  7:09 AM  5/1/2025

## 2025-05-01 NOTE — PROGRESS NOTES
4 Eyes Skin Assessment     NAME:  Trace Nassar  YOB: 1940  MEDICAL RECORD NUMBER:  [id number]    The patient is being assessed for  Admission    I agree that at least one RN has performed a thorough Head to Toe Skin Assessment on the patient. ALL assessment sites listed below have been assessed.      Areas assessed by both nurses:    Head, Face, Ears, Shoulders, Back, Chest, Arms, Elbows, Hands, Sacrum. Buttock, Coccyx, Ischium, and Legs. Feet and Heels        Does the Patient have a Wound? No noted wound(s)       Vidal Prevention initiated by RN: Yes  Wound Care Orders initiated by RN: No    Pressure Injury (Stage 3,4, Unstageable, DTI, NWPT, and Complex wounds) if present, place Wound referral order by RN under : No    New Ostomies, if present place, Ostomy referral order under : No     Nurse 1 eSignature: Electronically signed by Katie Gonzalez RN on 5/1/25 at 6:30 PM EDT    **SHARE this note so that the co-signing nurse can place an eSignature**    Nurse 2 eSignature: Electronically signed by Maria De Jesus Hyatt RN on 5/1/25 at 6:42 PM EDT

## 2025-05-01 NOTE — PLAN OF CARE
Problem: Chronic Conditions and Co-morbidities  Goal: Patient's chronic conditions and co-morbidity symptoms are monitored and maintained or improved  5/1/2025 1832 by Katie Gonzalez RN  Outcome: Progressing  5/1/2025 1547 by Karoline Tineo RN  Outcome: Progressing  Flowsheets  Taken 5/1/2025 1543  Care Plan - Patient's Chronic Conditions and Co-Morbidity Symptoms are Monitored and Maintained or Improved: Monitor and assess patient's chronic conditions and comorbid symptoms for stability, deterioration, or improvement  Taken 5/1/2025 0737  Care Plan - Patient's Chronic Conditions and Co-Morbidity Symptoms are Monitored and Maintained or Improved: Monitor and assess patient's chronic conditions and comorbid symptoms for stability, deterioration, or improvement     Problem: Discharge Planning  Goal: Discharge to home or other facility with appropriate resources  5/1/2025 1832 by Katie Gonzalez RN  Outcome: Progressing  5/1/2025 1547 by Karoline Tineo RN  Outcome: Progressing  Flowsheets  Taken 5/1/2025 1543  Discharge to home or other facility with appropriate resources: Identify barriers to discharge with patient and caregiver  Taken 5/1/2025 0737  Discharge to home or other facility with appropriate resources: Identify barriers to discharge with patient and caregiver     Problem: Safety - Adult  Goal: Free from fall injury  5/1/2025 1832 by Katie Gonzalez RN  Outcome: Progressing  5/1/2025 1547 by Karoline Tineo RN  Outcome: Progressing  Flowsheets (Taken 5/1/2025 1546)  Free From Fall Injury: Instruct family/caregiver on patient safety     Problem: ABCDS Injury Assessment  Goal: Absence of physical injury  5/1/2025 1832 by Katie Gonzalez RN  Outcome: Progressing  5/1/2025 1547 by Karoline Tineo RN  Outcome: Progressing  Flowsheets (Taken 5/1/2025 1546)  Absence of Physical Injury: Implement safety measures based on patient assessment     Problem: Pain  Goal: Verbalizes/displays

## 2025-05-02 LAB
ALBUMIN SERPL-MCNC: 3 G/DL (ref 3.5–5.2)
ALP SERPL-CCNC: 54 U/L (ref 40–129)
ALT SERPL-CCNC: 28 U/L (ref 0–50)
ANION GAP SERPL CALCULATED.3IONS-SCNC: 11 MMOL/L (ref 7–16)
AST SERPL-CCNC: 45 U/L (ref 0–50)
BASOPHILS # BLD: 0.03 K/UL (ref 0–0.2)
BASOPHILS NFR BLD: 1 % (ref 0–2)
BILIRUB SERPL-MCNC: 0.6 MG/DL (ref 0–1.2)
BUN SERPL-MCNC: 18 MG/DL (ref 8–23)
CALCIUM SERPL-MCNC: 8.8 MG/DL (ref 8.8–10.2)
CHLORIDE SERPL-SCNC: 107 MMOL/L (ref 98–107)
CO2 SERPL-SCNC: 19 MMOL/L (ref 22–29)
CREAT SERPL-MCNC: 1.2 MG/DL (ref 0.7–1.2)
EOSINOPHIL # BLD: 0.18 K/UL (ref 0.05–0.5)
EOSINOPHILS RELATIVE PERCENT: 4 % (ref 0–6)
ERYTHROCYTE [DISTWIDTH] IN BLOOD BY AUTOMATED COUNT: 15.9 % (ref 11.5–15)
GFR, ESTIMATED: 62 ML/MIN/1.73M2
GLUCOSE BLD-MCNC: 124 MG/DL (ref 74–99)
GLUCOSE BLD-MCNC: 131 MG/DL (ref 74–99)
GLUCOSE BLD-MCNC: 164 MG/DL (ref 74–99)
GLUCOSE BLD-MCNC: 182 MG/DL (ref 74–99)
GLUCOSE SERPL-MCNC: 105 MG/DL (ref 74–99)
HCT VFR BLD AUTO: 29.4 % (ref 37–54)
HGB BLD-MCNC: 9.4 G/DL (ref 12.5–16.5)
IMM GRANULOCYTES # BLD AUTO: <0.03 K/UL (ref 0–0.58)
IMM GRANULOCYTES NFR BLD: 0 % (ref 0–5)
INR PPP: 1.2
LYMPHOCYTES NFR BLD: 0.96 K/UL (ref 1.5–4)
LYMPHOCYTES RELATIVE PERCENT: 19 % (ref 20–42)
MCH RBC QN AUTO: 29.3 PG (ref 26–35)
MCHC RBC AUTO-ENTMCNC: 32 G/DL (ref 32–34.5)
MCV RBC AUTO: 91.6 FL (ref 80–99.9)
MONOCYTES NFR BLD: 0.77 K/UL (ref 0.1–0.95)
MONOCYTES NFR BLD: 15 % (ref 2–12)
NEUTROPHILS NFR BLD: 62 % (ref 43–80)
NEUTS SEG NFR BLD: 3.2 K/UL (ref 1.8–7.3)
PLATELET # BLD AUTO: 152 K/UL (ref 130–450)
PMV BLD AUTO: 11.7 FL (ref 7–12)
POTASSIUM SERPL-SCNC: 4.2 MMOL/L (ref 3.5–5.1)
PROT SERPL-MCNC: 6.3 G/DL (ref 6.4–8.3)
PROTHROMBIN TIME: 13.1 SEC (ref 9.3–12.4)
RBC # BLD AUTO: 3.21 M/UL (ref 3.8–5.8)
SODIUM SERPL-SCNC: 137 MMOL/L (ref 136–145)
WBC OTHER # BLD: 5.2 K/UL (ref 4.5–11.5)

## 2025-05-02 PROCEDURE — 6370000000 HC RX 637 (ALT 250 FOR IP): Performed by: NURSE PRACTITIONER

## 2025-05-02 PROCEDURE — 85025 COMPLETE CBC W/AUTO DIFF WBC: CPT

## 2025-05-02 PROCEDURE — 2500000003 HC RX 250 WO HCPCS: Performed by: NURSE PRACTITIONER

## 2025-05-02 PROCEDURE — 36415 COLL VENOUS BLD VENIPUNCTURE: CPT

## 2025-05-02 PROCEDURE — 99232 SBSQ HOSP IP/OBS MODERATE 35: CPT | Performed by: INTERNAL MEDICINE

## 2025-05-02 PROCEDURE — 97530 THERAPEUTIC ACTIVITIES: CPT

## 2025-05-02 PROCEDURE — 2500000003 HC RX 250 WO HCPCS: Performed by: INTERNAL MEDICINE

## 2025-05-02 PROCEDURE — 2140000000 HC CCU INTERMEDIATE R&B

## 2025-05-02 PROCEDURE — 6370000000 HC RX 637 (ALT 250 FOR IP): Performed by: INTERNAL MEDICINE

## 2025-05-02 PROCEDURE — 82962 GLUCOSE BLOOD TEST: CPT

## 2025-05-02 PROCEDURE — 97116 GAIT TRAINING THERAPY: CPT

## 2025-05-02 PROCEDURE — 80053 COMPREHEN METABOLIC PANEL: CPT

## 2025-05-02 PROCEDURE — 85610 PROTHROMBIN TIME: CPT

## 2025-05-02 RX ORDER — ENOXAPARIN SODIUM 100 MG/ML
40 INJECTION SUBCUTANEOUS DAILY
Status: DISCONTINUED | OUTPATIENT
Start: 2025-05-03 | End: 2025-05-05

## 2025-05-02 RX ADMIN — SODIUM CHLORIDE, PRESERVATIVE FREE 10 ML: 5 INJECTION INTRAVENOUS at 22:50

## 2025-05-02 RX ADMIN — SODIUM CHLORIDE, PRESERVATIVE FREE 10 ML: 5 INJECTION INTRAVENOUS at 22:51

## 2025-05-02 RX ADMIN — SODIUM CHLORIDE, PRESERVATIVE FREE 10 ML: 5 INJECTION INTRAVENOUS at 09:57

## 2025-05-02 RX ADMIN — INSULIN LISPRO 1 UNITS: 100 INJECTION, SOLUTION INTRAVENOUS; SUBCUTANEOUS at 12:42

## 2025-05-02 RX ADMIN — CLOPIDOGREL BISULFATE 75 MG: 75 TABLET, FILM COATED ORAL at 09:45

## 2025-05-02 RX ADMIN — GUAIFENESIN 400 MG: 400 TABLET ORAL at 18:32

## 2025-05-02 RX ADMIN — PANTOPRAZOLE SODIUM 40 MG: 40 TABLET, DELAYED RELEASE ORAL at 07:20

## 2025-05-02 RX ADMIN — SODIUM CHLORIDE, PRESERVATIVE FREE 10 ML: 5 INJECTION INTRAVENOUS at 09:46

## 2025-05-02 RX ADMIN — ASPIRIN 81 MG: 81 TABLET, COATED ORAL at 09:45

## 2025-05-02 RX ADMIN — SODIUM CHLORIDE, PRESERVATIVE FREE 10 ML: 5 INJECTION INTRAVENOUS at 09:58

## 2025-05-02 RX ADMIN — ATORVASTATIN CALCIUM 20 MG: 20 TABLET, FILM COATED ORAL at 20:27

## 2025-05-02 NOTE — PROGRESS NOTES
Comprehensive Nutrition Assessment    Type and Reason for Visit:  Initial (LOS)    Nutrition Recommendations/Plan:   Recommend and start Glucerna supplement BID and Miguel wound healing supplement BID to help meet increased nutritional needs from wound healing.           Malnutrition Assessment:  Malnutrition Status:  At risk for malnutrition (05/02/25 1206)    Context:  Acute Illness     Findings of the 6 clinical characteristics of malnutrition:  Energy Intake:  75% or less of estimated energy requirements for 7 or more days  Weight Loss:  Unable to assess (d/t possible fluid shifts  ; hx of CHF)     Body Fat Loss:  Unable to assess     Muscle Mass Loss:  Unable to assess    Fluid Accumulation:  No fluid accumulation     Strength:  Not Performed    Nutrition Assessment:    Patient adm w/ lightheadedness and syncope/collapse ; noted severe coronary artery disease ; s/p cardiac cath on 4/28 ; noted severe aortic stenosis and atrial fibrillation with slow ventricular response ; noted abdominal aortic aneurysm and cardiomyopathy ; possible TAVR ; hx of DM/CAD/CVA/COPD/CHF ; wound noted ; will provide recommendations    Nutrition Related Findings:    I&Os WNL, no edema, abrasions, A&O x 4, U/L dentures, active BS, constipation, hyperglycemia ; Wound Type: Open Wounds, Wound Consult Pending (wound x 1 ; puncture sites x 3)       Current Nutrition Intake & Therapies:    Average Meal Intake: 51-75%     ADULT DIET; Regular; Low Fat/Low Chol/High Fiber/2 gm Na    Anthropometric Measures:  Height: 160 cm (5' 3\")  Ideal Body Weight (IBW): 124 lbs (56 kg)    Admission Body Weight: 64.4 kg (142 lb) (4/25, standing scale)  Current Body Weight: 60.8 kg (134 lb) (5/2, bedscale), 108.1 % IBW. Weight Source: Bed scale  Current BMI (kg/m2): 23.7  Usual Body Weight:  (UTO ; EMR shows past weights of 142# actual on 4/17/25 and 153# bedscale on 11/26/22 ; poor recent weight history)                          BMI Categories: Normal  Weight (BMI 22.0 to 24.9) age over 65    Estimated Daily Nutrient Needs:  Energy Requirements Based On: Formula  Weight Used for Energy Requirements: Current  Energy (kcal/day): 8653-0978 (REE 1189 x 1.2 SF)  Weight Used for Protein Requirements: Current  Protein (g/day): 70-85 (1.2-1.4g/kg CBW)  Method Used for Fluid Requirements: 1 ml/kcal  Fluid (ml/day): 7999-0687    Nutrition Diagnosis:   Increased nutrient needs related to increase demand for energy/nutrients as evidenced by wounds    Nutrition Interventions:   Food and/or Nutrient Delivery: Continue Current Diet, Start Oral Nutrition Supplement  Nutrition Education/Counseling: Education/Counseling not indicated  Coordination of Nutrition Care: Continue to monitor while inpatient       Goals:  Goals: Meet at least 75% of estimated needs, by next RD assessment  Type of Goal: New goal  Previous Goal Met: New Goal    Nutrition Monitoring and Evaluation:   Behavioral-Environmental Outcomes: None Identified  Food/Nutrient Intake Outcomes: Food and Nutrient Intake, Supplement Intake  Physical Signs/Symptoms Outcomes: Biochemical Data, Chewing or Swallowing, GI Status, Fluid Status or Edema, Hemodynamic Status, Meal Time Behavior, Weight, Skin, Nutrition Focused Physical Findings, Constipation    Discharge Planning:    Too soon to determine     Farrukh Menendez RD, LD  Contact: [phone]

## 2025-05-02 NOTE — PROGRESS NOTES
Patient is seen in follow-up for CAD, syncope    Subjective:     Mr. Nassar feels okay today  Laying in bed no apparent distress    ROS:  CONSTITUTIONAL:  negative for  fevers, chills  HEENT:  negative for earaches, nasal congestion and epistaxis  RESPIRATORY:  negative for  dry cough, cough with sputum,wheezing and hemoptysis  GASTROINTESTINAL:  negative for nausea, vomiting  MUSCULOSKELETAL:  negative for  myalgias, arthralgias  NEUROLOGICAL:  negative for visual disturbance, dysphagia    Medication side effects: none    Scheduled Meds:   sodium chloride flush  5-40 mL IntraVENous 2 times per day    clopidogrel  75 mg Oral Daily    aspirin  81 mg Oral Daily    sodium chloride flush  5-40 mL IntraVENous 2 times per day    sodium chloride flush  10 mL IntraVENous 2 times per day    pantoprazole  40 mg Oral QAM AC    [Held by provider] losartan  25 mg Oral Daily    atorvastatin  20 mg Oral Nightly    insulin lispro  0-4 Units SubCUTAneous 4x Daily AC & HS     Continuous Infusions:   sodium chloride      sodium chloride      sodium chloride      dextrose       PRN Meds:sodium chloride flush, sodium chloride, acetaminophen, sodium chloride flush, sodium chloride flush, sodium chloride, guaiFENesin, sodium chloride flush, sodium chloride, ondansetron **OR** ondansetron, senna, melatonin, glucose, dextrose bolus **OR** dextrose bolus, glucagon (rDNA), dextrose    I/O last 3 completed shifts:  In: 960 [P.O.:960]  Out: -   No intake/output data recorded.      Objective:      Physical Exam:   BP (!) 139/58   Pulse 72   Temp 97.7 °F (36.5 °C) (Temporal)   Resp 18   Ht 1.6 m (5' 3\")   Wt 60.8 kg (134 lb)   SpO2 95%   BMI 23.74 kg/m²   CONSTITUTIONAL:  awake, alert, cooperative, no apparent distress, and appears stated age  HEAD:  normocepalic, without obvious abnormality, atraumatic  NECK:  Supple, symmetrical, trachea midline, no adenopathy, thyroid symmetric, not enlarged and no tenderness, skin normal  LUNGS:  No

## 2025-05-02 NOTE — CARE COORDINATION
Patient was a transfer from , positive orthos yesterday. Call made to therapy department for updated PT note to confirm if patient appropriate for returning home. Patient plans to return home, no needs reported and family to transport.    Electronically signed by DOUG Lezama on 5/2/2025 at 8:49 AM

## 2025-05-02 NOTE — PLAN OF CARE
Problem: Chronic Conditions and Co-morbidities  Goal: Patient's chronic conditions and co-morbidity symptoms are monitored and maintained or improved  5/1/2025 2138 by Nilsa Pratt RN  Outcome: Progressing  Flowsheets (Taken 5/1/2025 2030)  Care Plan - Patient's Chronic Conditions and Co-Morbidity Symptoms are Monitored and Maintained or Improved: Monitor and assess patient's chronic conditions and comorbid symptoms for stability, deterioration, or improvement  5/1/2025 1832 by Katie Gonzalez RN  Outcome: Progressing  5/1/2025 1547 by Karoline Tineo RN  Outcome: Progressing  Flowsheets  Taken 5/1/2025 1543  Care Plan - Patient's Chronic Conditions and Co-Morbidity Symptoms are Monitored and Maintained or Improved: Monitor and assess patient's chronic conditions and comorbid symptoms for stability, deterioration, or improvement  Taken 5/1/2025 0737  Care Plan - Patient's Chronic Conditions and Co-Morbidity Symptoms are Monitored and Maintained or Improved: Monitor and assess patient's chronic conditions and comorbid symptoms for stability, deterioration, or improvement     Problem: Discharge Planning  Goal: Discharge to home or other facility with appropriate resources  5/1/2025 2138 by Nilsa Pratt RN  Outcome: Progressing  Flowsheets (Taken 5/1/2025 2030)  Discharge to home or other facility with appropriate resources: Identify barriers to discharge with patient and caregiver  5/1/2025 1832 by Katie Gonzalez RN  Outcome: Progressing  5/1/2025 1547 by Karoline Tineo RN  Outcome: Progressing  Flowsheets  Taken 5/1/2025 1543  Discharge to home or other facility with appropriate resources: Identify barriers to discharge with patient and caregiver  Taken 5/1/2025 0737  Discharge to home or other facility with appropriate resources: Identify barriers to discharge with patient and caregiver     Problem: Safety - Adult  Goal: Free from fall injury  5/1/2025 2138 by Nilsa Pratt RN  Outcome:  Progressing  5/1/2025 1832 by Katie Gonzalez RN  Outcome: Progressing  5/1/2025 1547 by Karoline Tineo RN  Outcome: Progressing  Flowsheets (Taken 5/1/2025 1546)  Free From Fall Injury: Instruct family/caregiver on patient safety     Problem: ABCDS Injury Assessment  Goal: Absence of physical injury  5/1/2025 2138 by Nilsa Pratt RN  Outcome: Progressing  5/1/2025 1832 by Katie Gonzalez RN  Outcome: Progressing  5/1/2025 1547 by Karoline Tineo RN  Outcome: Progressing  Flowsheets (Taken 5/1/2025 1546)  Absence of Physical Injury: Implement safety measures based on patient assessment     Problem: Pain  Goal: Verbalizes/displays adequate comfort level or baseline comfort level  5/1/2025 2138 by Nilsa Pratt RN  Outcome: Progressing  5/1/2025 1832 by Katie Gonzalez RN  Outcome: Progressing  5/1/2025 1547 by Karoline Tineo RN  Outcome: Progressing  Flowsheets (Taken 5/1/2025 0737)  Verbalizes/displays adequate comfort level or baseline comfort level: Encourage patient to monitor pain and request assistance

## 2025-05-02 NOTE — PROGRESS NOTES
Physical Therapy  Physical Therapy Treatment    Name: Trace Nassar  : 1940  MRN: [id number]      Date of Service: 2025    Evaluating PT:  Eran Zhang PT, DPT    Room #:  6410/6410-A  Diagnosis:  Aortic stenosis [I35.0]  Severe aortic stenosis [I35.0]  PMHx/PSHx: A-fib ([facility]), Arthritis, CAD (coronary artery disease), CHF (congestive heart failure) ([facility]), COPD with acute exacerbation ([facility]), Diastolic heart failure ([facility]), Hyperlipidemia, Hypertension, and Unspecified cerebral artery occlusion with cerebral infarction. Coronary angioplasty with stent (2014); Coronary angioplasty with stent (2014); Cardioversion (2014); Colonoscopy; hernia repair; Abdomen surgery; eye surgery; Appendectomy; and Carotid endarterectomy (Left, 2015).   Procedure/Surgery:  L heart cath with PCI   Precautions: fall risk, + orthos, +alarms   Equipment Owned: None  Equipment Needs:  TBD    SUBJECTIVE:    Pt lives with alone in a bi-level home with one small step to enter. Bedroom is on the upper level with 5 stairs and 2 rails to reach. Bathroom is on the lower level with 5 stairs and 1 rail to reach.  Pt ambulated with no AD PTA.    OBJECTIVE:   Initial Evaluation  Date: 25 Treatment  Date: 25 Short Term/ Long Term   Goals   AM-PAC 6 Clicks     Was pt agreeable to Eval/treatment? yes Yes    Does pt have pain? No complaints No complaints    Bed Mobility  Rolling: NT  Supine to sit: SBA  Sit to supine: Min A   Scooting: SBA Rolling: NT  Supine to sit: SBA  Sit to supine: NT  Scooting: SBA Rolling: Independent  Supine to sit: Independent  Sit to supine: Independent  Scooting: Independent   Transfers Sit to stand: Min A  Stand to sit: Min A  Stand pivot: NT   Sit to stand: SBA  Stand to sit: SBA  Stand pivot: SBA without AD Sit to stand: Independent  Stand to sit: Independent  Stand pivot: Modified independent with AAD   Ambulation   NT    Ambulation deferred at this time d/t +orthos

## 2025-05-02 NOTE — PLAN OF CARE
Problem: Chronic Conditions and Co-morbidities  Goal: Patient's chronic conditions and co-morbidity symptoms are monitored and maintained or improved  Outcome: Progressing     Problem: Discharge Planning  Goal: Discharge to home or other facility with appropriate resources  Outcome: Progressing     Problem: Safety - Adult  Goal: Free from fall injury  Outcome: Progressing  Flowsheets (Taken 5/2/2025 0800 by Darcie Rosenthal, RN)  Free From Fall Injury: Instruct family/caregiver on patient safety     Problem: ABCDS Injury Assessment  Goal: Absence of physical injury  Outcome: Progressing  Flowsheets (Taken 5/2/2025 0800 by Darcie Rosenthal, RN)  Absence of Physical Injury: Implement safety measures based on patient assessment     Problem: Pain  Goal: Verbalizes/displays adequate comfort level or baseline comfort level  Outcome: Progressing     Problem: Nutrition Deficit:  Goal: Optimize nutritional status  Outcome: Progressing

## 2025-05-02 NOTE — PROGRESS NOTES
[facility] Medicine    Subjective:  pt alert conversive + orthostatic yesterday ivf ordered by cardio      Current Facility-Administered Medications:     sodium chloride flush 0.9 % injection 5-40 mL, 5-40 mL, IntraVENous, 2 times per day, Raulito Cadena MD    sodium chloride flush 0.9 % injection 5-40 mL, 5-40 mL, IntraVENous, PRN, Raulito Cadena MD    0.9 % sodium chloride infusion, , IntraVENous, PRN, Raulito Cadena MD    acetaminophen (TYLENOL) tablet 650 mg, 650 mg, Oral, Q4H PRN, Raulito Cadena MD    sodium chloride flush 0.9 % injection 10 mL, 10 mL, IntraVENous, Once PRN, Lovely Thompson MD    clopidogrel (PLAVIX) tablet 75 mg, 75 mg, Oral, Daily, Raulito Cadena MD, 75 mg at 05/01/25 1201    aspirin EC tablet 81 mg, 81 mg, Oral, Daily, Raulito Cadena MD, 81 mg at 05/01/25 1201    sodium chloride flush 0.9 % injection 5-40 mL, 5-40 mL, IntraVENous, 2 times per day, Raulito Cadena MD, 10 mL at 05/01/25 2133    sodium chloride flush 0.9 % injection 5-40 mL, 5-40 mL, IntraVENous, PRN, Raulito Cadena MD    0.9 % sodium chloride infusion, , IntraVENous, PRN, Raulito Cadena MD    guaiFENesin tablet 400 mg, 400 mg, Oral, BID PRN, Steven Nava DO, 400 mg at 04/30/25 1349    sodium chloride flush 0.9 % injection 10 mL, 10 mL, IntraVENous, 2 times per day, Nikki Cardoso APRN - CNP, 10 mL at 05/01/25 2012    sodium chloride flush 0.9 % injection 10 mL, 10 mL, IntraVENous, PRN, Nikki Cardoso APRN - CNP    0.9 % sodium chloride infusion, , IntraVENous, PRN, Nikki Cardoso, APRN - CNP    ondansetron (ZOFRAN-ODT) disintegrating tablet 4 mg, 4 mg, Oral, Q8H PRN **OR** ondansetron (ZOFRAN) injection 4 mg, 4 mg, IntraVENous, Q6H PRN, Nikki Cardoso, APRN - CNP    senna (SENOKOT) tablet 8.6 mg, 1 tablet, Oral, Daily PRN, Nikki Cardoso, KOSTA - CNP    melatonin tablet 6 mg, 6 mg, Oral, Nightly PRN, Nikki Cardoso, APRN - CNP    pantoprazole (PROTONIX) tablet 40 mg, 40 mg, Oral, QAM AC, Nikki Cardoso, APRN -

## 2025-05-03 LAB
ALBUMIN SERPL-MCNC: 3.1 G/DL (ref 3.5–5.2)
ALP SERPL-CCNC: 58 U/L (ref 40–129)
ALT SERPL-CCNC: 30 U/L (ref 0–50)
ANION GAP SERPL CALCULATED.3IONS-SCNC: 11 MMOL/L (ref 7–16)
AST SERPL-CCNC: 44 U/L (ref 0–50)
BASOPHILS # BLD: 0.04 K/UL (ref 0–0.2)
BASOPHILS NFR BLD: 1 % (ref 0–2)
BILIRUB SERPL-MCNC: 0.8 MG/DL (ref 0–1.2)
BUN SERPL-MCNC: 18 MG/DL (ref 8–23)
CALCIUM SERPL-MCNC: 8.9 MG/DL (ref 8.8–10.2)
CHLORIDE SERPL-SCNC: 103 MMOL/L (ref 98–107)
CO2 SERPL-SCNC: 20 MMOL/L (ref 22–29)
CREAT SERPL-MCNC: 1.1 MG/DL (ref 0.7–1.2)
EOSINOPHIL # BLD: 0.04 K/UL (ref 0.05–0.5)
EOSINOPHILS RELATIVE PERCENT: 1 % (ref 0–6)
ERYTHROCYTE [DISTWIDTH] IN BLOOD BY AUTOMATED COUNT: 15.9 % (ref 11.5–15)
GFR, ESTIMATED: 70 ML/MIN/1.73M2
GLUCOSE BLD-MCNC: 122 MG/DL (ref 74–99)
GLUCOSE BLD-MCNC: 125 MG/DL (ref 74–99)
GLUCOSE BLD-MCNC: 133 MG/DL (ref 74–99)
GLUCOSE BLD-MCNC: 136 MG/DL (ref 74–99)
GLUCOSE SERPL-MCNC: 123 MG/DL (ref 74–99)
HCT VFR BLD AUTO: 29.8 % (ref 37–54)
HGB BLD-MCNC: 9.7 G/DL (ref 12.5–16.5)
IMM GRANULOCYTES # BLD AUTO: 0.03 K/UL (ref 0–0.58)
IMM GRANULOCYTES NFR BLD: 0 % (ref 0–5)
INR PPP: 1.3
LYMPHOCYTES NFR BLD: 0.57 K/UL (ref 1.5–4)
LYMPHOCYTES RELATIVE PERCENT: 8 % (ref 20–42)
MCH RBC QN AUTO: 29.6 PG (ref 26–35)
MCHC RBC AUTO-ENTMCNC: 32.6 G/DL (ref 32–34.5)
MCV RBC AUTO: 90.9 FL (ref 80–99.9)
MONOCYTES NFR BLD: 0.59 K/UL (ref 0.1–0.95)
MONOCYTES NFR BLD: 8 % (ref 2–12)
NEUTROPHILS NFR BLD: 83 % (ref 43–80)
NEUTS SEG NFR BLD: 6.29 K/UL (ref 1.8–7.3)
PLATELET # BLD AUTO: 150 K/UL (ref 130–450)
PMV BLD AUTO: 11.7 FL (ref 7–12)
POTASSIUM SERPL-SCNC: 4.4 MMOL/L (ref 3.5–5.1)
PROT SERPL-MCNC: 6.5 G/DL (ref 6.4–8.3)
PROTHROMBIN TIME: 14.1 SEC (ref 9.3–12.4)
RBC # BLD AUTO: 3.28 M/UL (ref 3.8–5.8)
RBC # BLD: ABNORMAL 10*6/UL
SODIUM SERPL-SCNC: 133 MMOL/L (ref 136–145)
WBC OTHER # BLD: 7.6 K/UL (ref 4.5–11.5)

## 2025-05-03 PROCEDURE — APPSS60 APP SPLIT SHARED TIME 46-60 MINUTES

## 2025-05-03 PROCEDURE — 85025 COMPLETE CBC W/AUTO DIFF WBC: CPT

## 2025-05-03 PROCEDURE — 80053 COMPREHEN METABOLIC PANEL: CPT

## 2025-05-03 PROCEDURE — 2500000003 HC RX 250 WO HCPCS: Performed by: INTERNAL MEDICINE

## 2025-05-03 PROCEDURE — 6360000002 HC RX W HCPCS: Performed by: INTERNAL MEDICINE

## 2025-05-03 PROCEDURE — 6370000000 HC RX 637 (ALT 250 FOR IP): Performed by: NURSE PRACTITIONER

## 2025-05-03 PROCEDURE — 2140000000 HC CCU INTERMEDIATE R&B

## 2025-05-03 PROCEDURE — 93005 ELECTROCARDIOGRAM TRACING: CPT

## 2025-05-03 PROCEDURE — 2500000003 HC RX 250 WO HCPCS: Performed by: NURSE PRACTITIONER

## 2025-05-03 PROCEDURE — 82962 GLUCOSE BLOOD TEST: CPT

## 2025-05-03 PROCEDURE — 85610 PROTHROMBIN TIME: CPT

## 2025-05-03 PROCEDURE — 36415 COLL VENOUS BLD VENIPUNCTURE: CPT

## 2025-05-03 PROCEDURE — 6370000000 HC RX 637 (ALT 250 FOR IP): Performed by: INTERNAL MEDICINE

## 2025-05-03 PROCEDURE — 99223 1ST HOSP IP/OBS HIGH 75: CPT | Performed by: INTERNAL MEDICINE

## 2025-05-03 RX ADMIN — CLOPIDOGREL BISULFATE 75 MG: 75 TABLET, FILM COATED ORAL at 08:21

## 2025-05-03 RX ADMIN — SODIUM CHLORIDE, PRESERVATIVE FREE 10 ML: 5 INJECTION INTRAVENOUS at 21:06

## 2025-05-03 RX ADMIN — SODIUM CHLORIDE, PRESERVATIVE FREE 10 ML: 5 INJECTION INTRAVENOUS at 21:04

## 2025-05-03 RX ADMIN — ENOXAPARIN SODIUM 40 MG: 100 INJECTION SUBCUTANEOUS at 08:22

## 2025-05-03 RX ADMIN — SODIUM CHLORIDE, PRESERVATIVE FREE 10 ML: 5 INJECTION INTRAVENOUS at 08:45

## 2025-05-03 RX ADMIN — ASPIRIN 81 MG: 81 TABLET, COATED ORAL at 08:22

## 2025-05-03 RX ADMIN — ATORVASTATIN CALCIUM 20 MG: 20 TABLET, FILM COATED ORAL at 21:01

## 2025-05-03 NOTE — PLAN OF CARE
Problem: Chronic Conditions and Co-morbidities  Goal: Patient's chronic conditions and co-morbidity symptoms are monitored and maintained or improved  5/3/2025 0738 by Sreekanth Hayward RN  Outcome: Progressing  5/2/2025 2255 by Nilsa Pratt RN  Outcome: Progressing  Flowsheets (Taken 5/2/2025 2000)  Care Plan - Patient's Chronic Conditions and Co-Morbidity Symptoms are Monitored and Maintained or Improved: Monitor and assess patient's chronic conditions and comorbid symptoms for stability, deterioration, or improvement     Problem: Discharge Planning  Goal: Discharge to home or other facility with appropriate resources  5/3/2025 0738 by Sreekanth Hayward RN  Outcome: Progressing  5/2/2025 2255 by Nilsa Pratt RN  Outcome: Progressing  Flowsheets (Taken 5/2/2025 2000)  Discharge to home or other facility with appropriate resources: Identify barriers to discharge with patient and caregiver     Problem: Safety - Adult  Goal: Free from fall injury  5/3/2025 0738 by Sreekanth Hayward RN  Outcome: Progressing  5/2/2025 2255 by Nilsa Pratt RN  Outcome: Progressing     Problem: ABCDS Injury Assessment  Goal: Absence of physical injury  5/3/2025 0738 by Sreekanth Hayward RN  Outcome: Progressing  5/2/2025 2255 by Nilsa Pratt RN  Outcome: Progressing     Problem: Pain  Goal: Verbalizes/displays adequate comfort level or baseline comfort level  5/3/2025 0738 by Sreekanth Hayward RN  Outcome: Progressing  5/2/2025 2255 by Nilsa Pratt RN  Outcome: Progressing     Problem: Nutrition Deficit:  Goal: Optimize nutritional status  5/3/2025 0738 by Sreekanth Hayward RN  Outcome: Progressing  5/2/2025 2255 by Nilsa Pratt RN  Outcome: Progressing

## 2025-05-03 NOTE — PLAN OF CARE
Problem: Chronic Conditions and Co-morbidities  Goal: Patient's chronic conditions and co-morbidity symptoms are monitored and maintained or improved  5/2/2025 2255 by Nilsa Pratt RN  Outcome: Progressing  Flowsheets (Taken 5/2/2025 2000)  Care Plan - Patient's Chronic Conditions and Co-Morbidity Symptoms are Monitored and Maintained or Improved: Monitor and assess patient's chronic conditions and comorbid symptoms for stability, deterioration, or improvement  5/2/2025 1309 by Katie Gonzalez RN  Outcome: Progressing     Problem: Discharge Planning  Goal: Discharge to home or other facility with appropriate resources  5/2/2025 2255 by Nilsa Pratt RN  Outcome: Progressing  Flowsheets (Taken 5/2/2025 2000)  Discharge to home or other facility with appropriate resources: Identify barriers to discharge with patient and caregiver  5/2/2025 1309 by Katie Gonzalez RN  Outcome: Progressing     Problem: Safety - Adult  Goal: Free from fall injury  5/2/2025 2255 by Nilsa Pratt RN  Outcome: Progressing  5/2/2025 1309 by Katie Gonzalez RN  Outcome: Progressing  Flowsheets (Taken 5/2/2025 0800 by Darcie Rosenthal, RN)  Free From Fall Injury: Instruct family/caregiver on patient safety     Problem: ABCDS Injury Assessment  Goal: Absence of physical injury  5/2/2025 2255 by Nilsa Pratt RN  Outcome: Progressing  5/2/2025 1309 by Katie Gonzalez RN  Outcome: Progressing  Flowsheets (Taken 5/2/2025 0800 by Darcie Rosenthal, RN)  Absence of Physical Injury: Implement safety measures based on patient assessment     Problem: Pain  Goal: Verbalizes/displays adequate comfort level or baseline comfort level  5/2/2025 2255 by Nilsa Pratt RN  Outcome: Progressing  5/2/2025 1309 by Katie Gonzalez RN  Outcome: Progressing     Problem: Nutrition Deficit:  Goal: Optimize nutritional status  5/2/2025 2255 by Nilsa Pratt RN  Outcome: Progressing  5/2/2025 1309 by Katie Gonzalez RN  Outcome: Progressing

## 2025-05-03 NOTE — PROGRESS NOTES
85-year-old male past medical history of CAD (STEMI 2014 s/p PCI to LCx, most recent PCI of LMCA into LAD 4/28/2025), ICM, severe LF LG aortic stenosis, permanent A-fib (on Coumadin), abdominal aortic aneurysm who presented to the hospital with syncope and collapse while shopping and was found to have severe low-flow low gradient aortic stenosis and bradycardia with right bundle branch block but electrophysiology felt that there was no need for PPM at this time.    Patient denies having any chest pain, shortness of breath or LE edema.  However he had his most recent syncopal episode.  His TAVR workup showed severe LMCA into LAD disease s/p PCI.    Irregular, CTABL, no edema    Plan:  Wait to discuss with vascular surgery the options for possible percutaneous approach of fixing his abdominal aortic aneurysm prior to catheter in order to be able to do transfemoral TAVR.  If vascular surgery do not think it is possible to fix his abdominal aortic aneurysm percutaneously then we can consider alternative access TAVR

## 2025-05-03 NOTE — CONSULTS
time included the following:  Independently interviewing the patient (HPI, ROS, PMH, PSH, FMH, SH, allergies, and medications)  Independently performing a medically appropriate examination  Reviewing the above documentation completed by the TON  Ordering medications, tests, and/or procedures  Formulating the assessment/plan and reviewing the rationale for the above recommendations  Reviewing available records, results of all previously ordered testing/procedures, and current problem list  Counseling/educating the patient  Coordinating care with other healthcare professionals  Communicating results to the patient's family/caregiver  Documenting clinical information in the patient's electronic health record  -----------------------------------------------------------------------------------------------------------------------------------------------    Crow Luther MD  Interventional Cardiology/ Structural heart disease

## 2025-05-03 NOTE — PROGRESS NOTES
[facility] Medicine    Subjective: Patient is feeling better today  No dizziness or weakness      Current Facility-Administered Medications:     enoxaparin (LOVENOX) injection 40 mg, 40 mg, SubCUTAneous, Daily, Raulito Cadena MD, 40 mg at 05/03/25 0822    sodium chloride flush 0.9 % injection 5-40 mL, 5-40 mL, IntraVENous, 2 times per day, Raulito Cadena MD, 10 mL at 05/03/25 0845    sodium chloride flush 0.9 % injection 5-40 mL, 5-40 mL, IntraVENous, PRN, Raulito Cadena MD    0.9 % sodium chloride infusion, , IntraVENous, PRN, Raulito Cadena MD    acetaminophen (TYLENOL) tablet 650 mg, 650 mg, Oral, Q4H PRN, Raulito Cadena MD    sodium chloride flush 0.9 % injection 10 mL, 10 mL, IntraVENous, Once PRN, Lovely Thompson MD    clopidogrel (PLAVIX) tablet 75 mg, 75 mg, Oral, Daily, Raulito Cadena MD, 75 mg at 05/03/25 0821    aspirin EC tablet 81 mg, 81 mg, Oral, Daily, Raulito Cadena MD, 81 mg at 05/03/25 0822    sodium chloride flush 0.9 % injection 5-40 mL, 5-40 mL, IntraVENous, 2 times per day, Raultio Cadena MD, 10 mL at 05/03/25 0845    sodium chloride flush 0.9 % injection 5-40 mL, 5-40 mL, IntraVENous, PRN, Raulito Cadena MD    0.9 % sodium chloride infusion, , IntraVENous, PRN, Raulito Cadena MD    guaiFENesin tablet 400 mg, 400 mg, Oral, BID PRN, Steven Nava DO, 400 mg at 05/02/25 1832    sodium chloride flush 0.9 % injection 10 mL, 10 mL, IntraVENous, 2 times per day, Nikki Cardoso APRN - CNP, 10 mL at 05/03/25 0845    sodium chloride flush 0.9 % injection 10 mL, 10 mL, IntraVENous, PRNWalker Amanda R, APRN - CNP    0.9 % sodium chloride infusion, , IntraVENous, PRN, Nikki Cardoso APRN - CNP    ondansetron (ZOFRAN-ODT) disintegrating tablet 4 mg, 4 mg, Oral, Q8H PRN **OR** ondansetron (ZOFRAN) injection 4 mg, 4 mg, IntraVENous, Q6H PRN, Nikki Cardoso, APRN - CNP    senna (SENOKOT) tablet 8.6 mg, 1 tablet, Oral, Daily PRN, Nikki Cardoso, KOSTA - CNP    melatonin tablet 6 mg, 6 mg, Oral,

## 2025-05-04 LAB
ALBUMIN SERPL-MCNC: 3 G/DL (ref 3.5–5.2)
ALP SERPL-CCNC: 55 U/L (ref 40–129)
ALT SERPL-CCNC: 22 U/L (ref 0–50)
ANION GAP SERPL CALCULATED.3IONS-SCNC: 10 MMOL/L (ref 7–16)
AST SERPL-CCNC: 36 U/L (ref 0–50)
BASOPHILS # BLD: 0.05 K/UL (ref 0–0.2)
BASOPHILS NFR BLD: 1 % (ref 0–2)
BILIRUB SERPL-MCNC: 1 MG/DL (ref 0–1.2)
BUN SERPL-MCNC: 24 MG/DL (ref 8–23)
CALCIUM SERPL-MCNC: 8.9 MG/DL (ref 8.8–10.2)
CHLORIDE SERPL-SCNC: 104 MMOL/L (ref 98–107)
CO2 SERPL-SCNC: 21 MMOL/L (ref 22–29)
CREAT SERPL-MCNC: 1.1 MG/DL (ref 0.7–1.2)
EOSINOPHIL # BLD: 0.13 K/UL (ref 0.05–0.5)
EOSINOPHILS RELATIVE PERCENT: 2 % (ref 0–6)
ERYTHROCYTE [DISTWIDTH] IN BLOOD BY AUTOMATED COUNT: 15.9 % (ref 11.5–15)
GFR, ESTIMATED: 68 ML/MIN/1.73M2
GLUCOSE BLD-MCNC: 106 MG/DL (ref 74–99)
GLUCOSE BLD-MCNC: 136 MG/DL (ref 74–99)
GLUCOSE BLD-MCNC: 156 MG/DL (ref 74–99)
GLUCOSE BLD-MCNC: 170 MG/DL (ref 74–99)
GLUCOSE SERPL-MCNC: 104 MG/DL (ref 74–99)
HCT VFR BLD AUTO: 26.9 % (ref 37–54)
HGB BLD-MCNC: 9 G/DL (ref 12.5–16.5)
IMM GRANULOCYTES # BLD AUTO: <0.03 K/UL (ref 0–0.58)
IMM GRANULOCYTES NFR BLD: 0 % (ref 0–5)
INR PPP: 1.4
LYMPHOCYTES NFR BLD: 1.27 K/UL (ref 1.5–4)
LYMPHOCYTES RELATIVE PERCENT: 19 % (ref 20–42)
MCH RBC QN AUTO: 29.8 PG (ref 26–35)
MCHC RBC AUTO-ENTMCNC: 33.5 G/DL (ref 32–34.5)
MCV RBC AUTO: 89.1 FL (ref 80–99.9)
MONOCYTES NFR BLD: 0.77 K/UL (ref 0.1–0.95)
MONOCYTES NFR BLD: 11 % (ref 2–12)
NEUTROPHILS NFR BLD: 67 % (ref 43–80)
NEUTS SEG NFR BLD: 4.57 K/UL (ref 1.8–7.3)
PLATELET # BLD AUTO: 158 K/UL (ref 130–450)
PMV BLD AUTO: 10.9 FL (ref 7–12)
POTASSIUM SERPL-SCNC: 4.7 MMOL/L (ref 3.5–5.1)
PROT SERPL-MCNC: 6.4 G/DL (ref 6.4–8.3)
PROTHROMBIN TIME: 14.9 SEC (ref 9.3–12.4)
RBC # BLD AUTO: 3.02 M/UL (ref 3.8–5.8)
SODIUM SERPL-SCNC: 134 MMOL/L (ref 136–145)
WBC OTHER # BLD: 6.8 K/UL (ref 4.5–11.5)

## 2025-05-04 PROCEDURE — 36415 COLL VENOUS BLD VENIPUNCTURE: CPT

## 2025-05-04 PROCEDURE — 80053 COMPREHEN METABOLIC PANEL: CPT

## 2025-05-04 PROCEDURE — 2500000003 HC RX 250 WO HCPCS: Performed by: NURSE PRACTITIONER

## 2025-05-04 PROCEDURE — 82962 GLUCOSE BLOOD TEST: CPT

## 2025-05-04 PROCEDURE — 99233 SBSQ HOSP IP/OBS HIGH 50: CPT | Performed by: INTERNAL MEDICINE

## 2025-05-04 PROCEDURE — 85610 PROTHROMBIN TIME: CPT

## 2025-05-04 PROCEDURE — 6370000000 HC RX 637 (ALT 250 FOR IP): Performed by: NURSE PRACTITIONER

## 2025-05-04 PROCEDURE — 6360000002 HC RX W HCPCS: Performed by: INTERNAL MEDICINE

## 2025-05-04 PROCEDURE — 85025 COMPLETE CBC W/AUTO DIFF WBC: CPT

## 2025-05-04 PROCEDURE — 2140000000 HC CCU INTERMEDIATE R&B

## 2025-05-04 PROCEDURE — 6370000000 HC RX 637 (ALT 250 FOR IP): Performed by: INTERNAL MEDICINE

## 2025-05-04 RX ORDER — ATORVASTATIN CALCIUM 40 MG/1
40 TABLET, FILM COATED ORAL NIGHTLY
Status: DISCONTINUED | OUTPATIENT
Start: 2025-05-05 | End: 2025-05-05 | Stop reason: HOSPADM

## 2025-05-04 RX ADMIN — CLOPIDOGREL BISULFATE 75 MG: 75 TABLET, FILM COATED ORAL at 09:51

## 2025-05-04 RX ADMIN — SODIUM CHLORIDE, PRESERVATIVE FREE 10 ML: 5 INJECTION INTRAVENOUS at 21:39

## 2025-05-04 RX ADMIN — ATORVASTATIN CALCIUM 20 MG: 20 TABLET, FILM COATED ORAL at 21:39

## 2025-05-04 RX ADMIN — SODIUM CHLORIDE, PRESERVATIVE FREE 10 ML: 5 INJECTION INTRAVENOUS at 09:51

## 2025-05-04 RX ADMIN — ENOXAPARIN SODIUM 40 MG: 100 INJECTION SUBCUTANEOUS at 09:51

## 2025-05-04 RX ADMIN — ASPIRIN 81 MG: 81 TABLET, COATED ORAL at 09:51

## 2025-05-04 RX ADMIN — PANTOPRAZOLE SODIUM 40 MG: 40 TABLET, DELAYED RELEASE ORAL at 06:10

## 2025-05-04 ASSESSMENT — PAIN SCALES - GENERAL: PAINLEVEL_OUTOF10: 0

## 2025-05-04 NOTE — PLAN OF CARE
Problem: Chronic Conditions and Co-morbidities  Goal: Patient's chronic conditions and co-morbidity symptoms are monitored and maintained or improved  Outcome: Progressing  Flowsheets  Taken 5/3/2025 2030 by Nilsa Pratt RN  Care Plan - Patient's Chronic Conditions and Co-Morbidity Symptoms are Monitored and Maintained or Improved: Monitor and assess patient's chronic conditions and comorbid symptoms for stability, deterioration, or improvement  Taken 5/3/2025 0851 by Sreekanth Hayward RN  Care Plan - Patient's Chronic Conditions and Co-Morbidity Symptoms are Monitored and Maintained or Improved:   Monitor and assess patient's chronic conditions and comorbid symptoms for stability, deterioration, or improvement   Collaborate with multidisciplinary team to address chronic and comorbid conditions and prevent exacerbation or deterioration     Problem: Discharge Planning  Goal: Discharge to home or other facility with appropriate resources  Outcome: Progressing  Flowsheets  Taken 5/3/2025 2030 by Nilsa Pratt RN  Discharge to home or other facility with appropriate resources: Identify barriers to discharge with patient and caregiver  Taken 5/3/2025 0851 by Sreekanth Hayward RN  Discharge to home or other facility with appropriate resources:   Identify barriers to discharge with patient and caregiver   Arrange for needed discharge resources and transportation as appropriate     Problem: Safety - Adult  Goal: Free from fall injury  Outcome: Progressing     Problem: ABCDS Injury Assessment  Goal: Absence of physical injury  Outcome: Progressing     Problem: Pain  Goal: Verbalizes/displays adequate comfort level or baseline comfort level  Outcome: Progressing     Problem: Nutrition Deficit:  Goal: Optimize nutritional status  Outcome: Progressing

## 2025-05-04 NOTE — PROGRESS NOTES
[facility] Medicine    Subjective: Patient is feeling better today  No dizziness or weakness      Current Facility-Administered Medications:     enoxaparin (LOVENOX) injection 40 mg, 40 mg, SubCUTAneous, Daily, Raulito Cadena MD, 40 mg at 05/04/25 0951    sodium chloride flush 0.9 % injection 5-40 mL, 5-40 mL, IntraVENous, 2 times per day, Raulito Cadena MD, 10 mL at 05/03/25 2106    sodium chloride flush 0.9 % injection 5-40 mL, 5-40 mL, IntraVENous, PRN, Raulito Cadena MD    0.9 % sodium chloride infusion, , IntraVENous, PRN, Raulito Cadena MD    acetaminophen (TYLENOL) tablet 650 mg, 650 mg, Oral, Q4H PRN, Raulito Cadena MD    sodium chloride flush 0.9 % injection 10 mL, 10 mL, IntraVENous, Once PRN, Lovely Thompson MD    clopidogrel (PLAVIX) tablet 75 mg, 75 mg, Oral, Daily, Raulito Cadena MD, 75 mg at 05/04/25 0951    aspirin EC tablet 81 mg, 81 mg, Oral, Daily, Raulito Cadena MD, 81 mg at 05/04/25 0951    sodium chloride flush 0.9 % injection 5-40 mL, 5-40 mL, IntraVENous, 2 times per day, Raulito Cadena MD, 10 mL at 05/03/25 2106    sodium chloride flush 0.9 % injection 5-40 mL, 5-40 mL, IntraVENous, PRN, Raulito Cadena MD    0.9 % sodium chloride infusion, , IntraVENous, PRN, Raulito Cadena MD    guaiFENesin tablet 400 mg, 400 mg, Oral, BID PRN, Steven Nava DO, 400 mg at 05/02/25 1832    sodium chloride flush 0.9 % injection 10 mL, 10 mL, IntraVENous, 2 times per day, Nikki Cardoso APRN - CNP, 10 mL at 05/04/25 0951    sodium chloride flush 0.9 % injection 10 mL, 10 mL, IntraVENous, PRNWalker Amanda R, APRN - CNP    0.9 % sodium chloride infusion, , IntraVENous, PRN, Nikki Cardoso, KOSTA - CNP    ondansetron (ZOFRAN-ODT) disintegrating tablet 4 mg, 4 mg, Oral, Q8H PRN **OR** ondansetron (ZOFRAN) injection 4 mg, 4 mg, IntraVENous, Q6H PRN, Nikki Cardoso, APRN - CNP    senna (SENOKOT) tablet 8.6 mg, 1 tablet, Oral, Daily PRN, Nikki Cardoso, KOSTA - CNP    melatonin tablet 6 mg, 6 mg, Oral,

## 2025-05-04 NOTE — PROGRESS NOTES
Inpatient Cardiology Progress note        SUBJECTIVE/OBJECTIVE:   No chest pain or shortness of breath  Vascular surgery evaluation for abdominal aneurysm repair       PHYSICAL EXAM:   BP (!) 110/56   Pulse 74   Temp 97.5 °F (36.4 °C) (Temporal)   Resp 17   Ht 1.6 m (5' 3\")   Wt 64.7 kg (142 lb 9.6 oz)   SpO2 94%   BMI 25.26 kg/m²    B/P Range last 24 hours: Systolic (24hrs), Av , Min:110 , Max:141    Diastolic (24hrs), Av, Min:52, Max:73    GENERAL: NAD   HEAD: Normocephalic, atraumatic.   NECK: No JVD. No carotid bruits. No neck masses.    CARDIOVASCULAR: Irregularly irregular rhythm, normal S1, systolic murmur with diminished S2   LUNGS: Clear to auscultation bilaterally, no wheezing.    ABDOMEN: Abdomen is soft and not distended. No tenderness.    EXTREMITIES: There is no pedal edema.   MUSCULOSKELETAL: No joint swelling. Normal range of motion    SKIN: Skin is moist. No ulcers or lesions.   NEUROLOGICAL: No evidence of obvious neurological deficits.    PSYCHOLOGICAL: Patient is in normal mood.        Intake/Output Summary (Last 24 hours) at 2025 2308  Last data filed at 2025 0908  Gross per 24 hour   Intake 980 ml   Output --   Net 980 ml       Weight:   Wt Readings from Last 3 Encounters:   25 64.7 kg (142 lb 9.6 oz)   25 66.7 kg (147 lb 0.8 oz)   25 64.8 kg (142 lb 12.8 oz)       Current Inpatient Medications:   [START ON 2025] atorvastatin  40 mg Oral Nightly    enoxaparin  40 mg SubCUTAneous Daily    sodium chloride flush  5-40 mL IntraVENous 2 times per day    clopidogrel  75 mg Oral Daily    aspirin  81 mg Oral Daily    sodium chloride flush  5-40 mL IntraVENous 2 times per day    sodium chloride flush  10 mL IntraVENous 2 times per day    pantoprazole  40 mg Oral QAM AC    [Held by provider] losartan  25 mg Oral Daily    insulin lispro  0-4 Units SubCUTAneous 4x Daily AC & HS       IV Infusions (if any):   sodium chloride      sodium chloride      sodium   Tricuspid Valve: Moderate regurgitation.    Left Atrium: Left atrium is dilated.    Image quality is suboptimal. Contrast used: Lumason. Technically difficult study with poor endocardial visualization and technically difficult study due to patient's body habitus.    Atrial fibrillation with slow ventricular response throughout study.     -ECG 5/3/25:  A.fib with RBBB     -Cardiac cath 4/28/25:  1.  Severely diseased heavily calcified left main coronary artery  2.  Significant, heavily calcified ostial LAD stenosis, mild in-stent stenosis of mid LAD  3.  Large left circumflex artery with a patent stent in the midsegment with minimal disease  4.  Posterior takeoff of small nondominant RCA that is totally occluded with left-to-right collaterals  5.  Severe coronary calcifications     -PCI 4/30/25:  PCI to LMCA into LAD     HPI:  85-year-old male past medical history of CAD (STEMI 2014 s/p PCI to LCx, most recent PCI of LMCA into LAD 4/28/2025), ICM, severe LF LG aortic stenosis, permanent A-fib (on Coumadin), abdominal aortic aneurysm who presented to the hospital with syncope and collapse while shopping and was found to have severe low-flow low gradient aortic stenosis and bradycardia with right bundle branch block but electrophysiology felt that there was no need for PPM at this time.     Patient denies having any chest pain, shortness of breath or LE edema.  However he had his most recent syncopal episode.  His TAVR workup showed severe LMCA into LAD disease s/p PCI.        Impression/Plan:     Aortic stenosis:  Patient with severe low-flow low gradient aortic stenosis with mean gradient of 21 mmHg, [place] 0.8 cm².  We need to discuss with vascular surgery the options for possible EVAR to fix his abdominal aortic aneurysm prior to catheter in order to be able to do transfemoral TAVR.  If vascular surgery do not think it is possible to fix his abdominal aortic aneurysm percutaneously then we can consider alternative

## 2025-05-05 VITALS
BODY MASS INDEX: 25.04 KG/M2 | DIASTOLIC BLOOD PRESSURE: 80 MMHG | WEIGHT: 141.3 LBS | SYSTOLIC BLOOD PRESSURE: 132 MMHG | OXYGEN SATURATION: 98 % | RESPIRATION RATE: 16 BRPM | HEIGHT: 63 IN | HEART RATE: 64 BPM | TEMPERATURE: 98.1 F

## 2025-05-05 LAB
ALBUMIN SERPL-MCNC: 2.9 G/DL (ref 3.5–5.2)
ALP SERPL-CCNC: 66 U/L (ref 40–129)
ALT SERPL-CCNC: 26 U/L (ref 0–50)
ANION GAP SERPL CALCULATED.3IONS-SCNC: 11 MMOL/L (ref 7–16)
AST SERPL-CCNC: 39 U/L (ref 0–50)
BASOPHILS # BLD: 0.03 K/UL (ref 0–0.2)
BASOPHILS NFR BLD: 1 % (ref 0–2)
BILIRUB SERPL-MCNC: 0.8 MG/DL (ref 0–1.2)
BUN SERPL-MCNC: 25 MG/DL (ref 8–23)
CALCIUM SERPL-MCNC: 8.8 MG/DL (ref 8.8–10.2)
CHLORIDE SERPL-SCNC: 105 MMOL/L (ref 98–107)
CO2 SERPL-SCNC: 22 MMOL/L (ref 22–29)
CREAT SERPL-MCNC: 1.2 MG/DL (ref 0.7–1.2)
ECHO BSA: 1.67 M2
EOSINOPHIL # BLD: 0.21 K/UL (ref 0.05–0.5)
EOSINOPHILS RELATIVE PERCENT: 4 % (ref 0–6)
ERYTHROCYTE [DISTWIDTH] IN BLOOD BY AUTOMATED COUNT: 15.9 % (ref 11.5–15)
GFR, ESTIMATED: 58 ML/MIN/1.73M2
GLUCOSE BLD-MCNC: 112 MG/DL (ref 74–99)
GLUCOSE BLD-MCNC: 123 MG/DL (ref 74–99)
GLUCOSE SERPL-MCNC: 107 MG/DL (ref 74–99)
HCT VFR BLD AUTO: 26.3 % (ref 37–54)
HGB BLD-MCNC: 8.9 G/DL (ref 12.5–16.5)
IMM GRANULOCYTES # BLD AUTO: <0.03 K/UL (ref 0–0.58)
IMM GRANULOCYTES NFR BLD: 0 % (ref 0–5)
INR PPP: 1.3
LYMPHOCYTES NFR BLD: 1.1 K/UL (ref 1.5–4)
LYMPHOCYTES RELATIVE PERCENT: 20 % (ref 20–42)
MCH RBC QN AUTO: 30.3 PG (ref 26–35)
MCHC RBC AUTO-ENTMCNC: 33.8 G/DL (ref 32–34.5)
MCV RBC AUTO: 89.5 FL (ref 80–99.9)
MONOCYTES NFR BLD: 0.66 K/UL (ref 0.1–0.95)
MONOCYTES NFR BLD: 12 % (ref 2–12)
NEUTROPHILS NFR BLD: 64 % (ref 43–80)
NEUTS SEG NFR BLD: 3.52 K/UL (ref 1.8–7.3)
PLATELET # BLD AUTO: 183 K/UL (ref 130–450)
PMV BLD AUTO: 10.9 FL (ref 7–12)
POTASSIUM SERPL-SCNC: 4.6 MMOL/L (ref 3.5–5.1)
PROT SERPL-MCNC: 6.5 G/DL (ref 6.4–8.3)
PROTHROMBIN TIME: 14.2 SEC (ref 9.3–12.4)
RBC # BLD AUTO: 2.94 M/UL (ref 3.8–5.8)
SODIUM SERPL-SCNC: 137 MMOL/L (ref 136–145)
WBC OTHER # BLD: 5.5 K/UL (ref 4.5–11.5)

## 2025-05-05 PROCEDURE — 80053 COMPREHEN METABOLIC PANEL: CPT

## 2025-05-05 PROCEDURE — 93246 EXT ECG>7D<15D RECORDING: CPT

## 2025-05-05 PROCEDURE — 85025 COMPLETE CBC W/AUTO DIFF WBC: CPT

## 2025-05-05 PROCEDURE — 6360000002 HC RX W HCPCS: Performed by: INTERNAL MEDICINE

## 2025-05-05 PROCEDURE — 97535 SELF CARE MNGMENT TRAINING: CPT

## 2025-05-05 PROCEDURE — 6370000000 HC RX 637 (ALT 250 FOR IP): Performed by: NURSE PRACTITIONER

## 2025-05-05 PROCEDURE — 6370000000 HC RX 637 (ALT 250 FOR IP): Performed by: INTERNAL MEDICINE

## 2025-05-05 PROCEDURE — 85610 PROTHROMBIN TIME: CPT

## 2025-05-05 PROCEDURE — 97530 THERAPEUTIC ACTIVITIES: CPT

## 2025-05-05 PROCEDURE — 2500000003 HC RX 250 WO HCPCS: Performed by: NURSE PRACTITIONER

## 2025-05-05 PROCEDURE — 82962 GLUCOSE BLOOD TEST: CPT

## 2025-05-05 PROCEDURE — 99233 SBSQ HOSP IP/OBS HIGH 50: CPT | Performed by: SURGERY

## 2025-05-05 PROCEDURE — 36415 COLL VENOUS BLD VENIPUNCTURE: CPT

## 2025-05-05 PROCEDURE — 99233 SBSQ HOSP IP/OBS HIGH 50: CPT | Performed by: INTERNAL MEDICINE

## 2025-05-05 RX ORDER — CLOPIDOGREL BISULFATE 75 MG/1
75 TABLET ORAL DAILY
Qty: 30 TABLET | Refills: 0 | Status: ON HOLD | OUTPATIENT
Start: 2025-05-06 | End: 2025-06-14

## 2025-05-05 RX ORDER — ASPIRIN 81 MG/1
81 TABLET, COATED ORAL DAILY
Qty: 30 TABLET | Refills: 0 | Status: ON HOLD | OUTPATIENT
Start: 2025-05-05 | End: 2025-06-14 | Stop reason: HOSPADM

## 2025-05-05 RX ORDER — ATORVASTATIN CALCIUM 40 MG/1
40 TABLET, FILM COATED ORAL NIGHTLY
Qty: 30 TABLET | Refills: 0 | Status: SHIPPED | OUTPATIENT
Start: 2025-05-05

## 2025-05-05 RX ADMIN — APIXABAN 5 MG: 5 TABLET, FILM COATED ORAL at 19:58

## 2025-05-05 RX ADMIN — GUAIFENESIN 400 MG: 400 TABLET ORAL at 12:13

## 2025-05-05 RX ADMIN — ASPIRIN 81 MG: 81 TABLET, COATED ORAL at 10:15

## 2025-05-05 RX ADMIN — ENOXAPARIN SODIUM 40 MG: 100 INJECTION SUBCUTANEOUS at 10:15

## 2025-05-05 RX ADMIN — PANTOPRAZOLE SODIUM 40 MG: 40 TABLET, DELAYED RELEASE ORAL at 06:13

## 2025-05-05 RX ADMIN — ATORVASTATIN CALCIUM 40 MG: 40 TABLET, FILM COATED ORAL at 19:58

## 2025-05-05 RX ADMIN — CLOPIDOGREL BISULFATE 75 MG: 75 TABLET, FILM COATED ORAL at 10:15

## 2025-05-05 RX ADMIN — SODIUM CHLORIDE, PRESERVATIVE FREE 10 ML: 5 INJECTION INTRAVENOUS at 10:15

## 2025-05-05 NOTE — CONSULTS
Vascular Surgery Progress Note    CC: Reconsult for EVAR evaluation    HISTORY:  The patient is awake, alert, and oriented.  He said that he spoke with many doctors and family members and has decided that he will agree to TAVR.  He also would agree to EVAR.    IMPRESSION: Status post PCI with severe aortic stenosis and large abdominal aortic aneurysm.    I reviewed the patient with Dr. Luther.  Due to the risk of embolization with the abdominal aortic aneurysm, he would prefer if I would perform the EVAR initially so that TAVR would not be performed necessitating an alternative access.  I agree that this is the best approach.  I will plan for EVAR hopefully with local and sedation to avoid general anesthesia.    My preference would be allow the patient to be discharged and have him return for same-day surgery.  I reviewed this with the patient and he understands and agrees to the plan.    Discharge from vascular surgery when medically stable and I will readmit him for endovascular aneurysm repair.      Patient Active Problem List   Diagnosis Code    Atrial fibrillation with slow ventricular response (HCC) I48.91    Hyperlipidemia with target LDL less than 100 E78.5    History of CVA (cerebrovascular accident) Z86.73    CAD (coronary artery disease), native coronary artery I25.10    Mitral insufficiency I34.0    Carotid stenosis, asymptomatic, bilateral I65.23    Anemia D64.9    Nonrheumatic aortic valve stenosis I35.0    Heart failure with mid-range ejection fraction (HFmEF) ([facility]) I50.22    Syncope R55    Bradycardia R00.1    Severe aortic stenosis I35.0    Permanent atrial fibrillation (HCC) I48.21    Symptoms involving cardiovascular system R09.89    Infrarenal abdominal aortic aneurysm (AAA) without rupture I71.43       Current Medications:    sodium chloride      sodium chloride      sodium chloride      dextrose        sodium chloride flush, sodium chloride, acetaminophen, sodium chloride flush, sodium chloride  flush, sodium chloride, guaiFENesin, sodium chloride flush, sodium chloride, ondansetron **OR** ondansetron, senna, melatonin, glucose, dextrose bolus **OR** dextrose bolus, glucagon (rDNA), dextrose    atorvastatin  40 mg Oral Nightly    enoxaparin  40 mg SubCUTAneous Daily    sodium chloride flush  5-40 mL IntraVENous 2 times per day    clopidogrel  75 mg Oral Daily    aspirin  81 mg Oral Daily    sodium chloride flush  5-40 mL IntraVENous 2 times per day    sodium chloride flush  10 mL IntraVENous 2 times per day    pantoprazole  40 mg Oral QAM AC    [Held by provider] losartan  25 mg Oral Daily    insulin lispro  0-4 Units SubCUTAneous 4x Daily AC & HS          PHYSICAL EXAM:    Vitals:    05/05/25 0812   BP: 126/82   Pulse: 74   Resp: 16   Temp: 98.2 °F (36.8 °C)   SpO2: 96%     CONSTITUTIONAL:  awake, alert, cooperative, no apparent distress  LUNGS:  no increased work of breathing, good air exchange and   CARDIOVASCULAR:  regular rate and rhythm  ABDOMEN: non-distended and non-tender      LABS:    Lab Results   Component Value Date    WBC 5.5 05/05/2025    HGB 8.9 (L) 05/05/2025    HCT 26.3 (L) 05/05/2025     05/05/2025    PROTIME 14.2 (H) 05/05/2025    INR 1.3 05/05/2025    APTT 41.6 (H) 11/24/2022    K 4.6 05/05/2025    BUN 25 (H) 05/05/2025    CREATININE 1.2 05/05/2025       RADIOLOGY:

## 2025-05-05 NOTE — PROGRESS NOTES
Dr. Luther ok for discharge today from cardiology standpoint. Patient to start on 5 mg of Eliquis BID per Dr. Luther. New orders received. Patient to remain on Eliquis, Plavix, and Aspirin for 1 month per Dr. Luther. After 1 month, patient can stop taking the Aspirin, but continue taking Eliquis and Plavix per Dr. Luther.     PATRICE CHOU RN

## 2025-05-05 NOTE — PROGRESS NOTES
Update provided to Dr. Nava via phone call in regards to Dr. Luther's medication changes at discharge.     PATRICE CHOU RN

## 2025-05-05 NOTE — PROGRESS NOTES
Was asked to reconsult vascular surgery for re eval of AAA prior to TAVR at this time sent via perfect serve

## 2025-05-05 NOTE — PROGRESS NOTES
Voicemail left for Dr. Luther in regards to anticogulation recs at discharge.    PATRICE CHOU RN

## 2025-05-05 NOTE — PROGRESS NOTES
Message sent to vascular resident to notify of cardiology request for vascular surgery to evaluate aneurysm prior to TAVR.    PATRICE CHOU RN

## 2025-05-05 NOTE — PLAN OF CARE
Problem: Chronic Conditions and Co-morbidities  Goal: Patient's chronic conditions and co-morbidity symptoms are monitored and maintained or improved  Outcome: Progressing     Problem: Discharge Planning  Goal: Discharge to home or other facility with appropriate resources  Outcome: Progressing     Problem: Safety - Adult  Goal: Free from fall injury  Outcome: Progressing     Problem: ABCDS Injury Assessment  Goal: Absence of physical injury  Outcome: Progressing     Problem: Pain  Goal: Verbalizes/displays adequate comfort level or baseline comfort level  Outcome: Progressing     Problem: Nutrition Deficit:  Goal: Optimize nutritional status  Outcome: Progressing

## 2025-05-05 NOTE — PROGRESS NOTES
Inpatient Cardiology Progress note        SUBJECTIVE/OBJECTIVE:   No chest pain or shortness of breath  Vascular surgery recommended EVAR as outpatient      PHYSICAL EXAM:   /80   Pulse 64   Temp 98.1 °F (36.7 °C) (Oral)   Resp 16   Ht 1.6 m (5' 3\")   Wt 64.1 kg (141 lb 4.8 oz)   SpO2 98%   BMI 25.03 kg/m²    B/P Range last 24 hours: Systolic (24hrs), Av , Min:106 , Max:132    Diastolic (24hrs), Av, Min:56, Max:82    GENERAL: NAD   HEAD: Normocephalic, atraumatic.   NECK: No JVD. No carotid bruits. No neck masses.    CARDIOVASCULAR: Irregularly irregular rhythm, normal S1, systolic murmur with diminished S2   LUNGS: Clear to auscultation bilaterally, no wheezing.    ABDOMEN: Abdomen is soft and not distended. No tenderness.    EXTREMITIES: There is no pedal edema.   MUSCULOSKELETAL: No joint swelling. Normal range of motion    SKIN: Skin is moist. No ulcers or lesions.   NEUROLOGICAL: No evidence of obvious neurological deficits.    PSYCHOLOGICAL: Patient is in normal mood.        Intake/Output Summary (Last 24 hours) at 2025 1700  Last data filed at 2025 0502  Gross per 24 hour   Intake 220 ml   Output 0 ml   Net 220 ml       Weight:   Wt Readings from Last 3 Encounters:   25 64.1 kg (141 lb 4.8 oz)   25 66.7 kg (147 lb 0.8 oz)   25 64.8 kg (142 lb 12.8 oz)       Current Inpatient Medications:   apixaban  5 mg Oral BID    atorvastatin  40 mg Oral Nightly    sodium chloride flush  5-40 mL IntraVENous 2 times per day    clopidogrel  75 mg Oral Daily    aspirin  81 mg Oral Daily    sodium chloride flush  5-40 mL IntraVENous 2 times per day    sodium chloride flush  10 mL IntraVENous 2 times per day    pantoprazole  40 mg Oral QAM AC    [Held by provider] losartan  25 mg Oral Daily    insulin lispro  0-4 Units SubCUTAneous 4x Daily AC & HS       IV Infusions (if any):   sodium chloride      sodium chloride      sodium chloride      dextrose         DIAGNOSTIC/

## 2025-05-05 NOTE — PROGRESS NOTES
Occupational Therapy  OT BEDSIDE TREATMENT NOTE   [facility]  [address] [place], [place]      Date:2025  Patient Name: Trace Nassar  MRN: [id number]  : 1940  Room: [address] [place] [address]     Evaluating OT: Bryan Marti OTR/L #[id number]      Referring Provider: Steven Nava DO   Specific Provider Orders/Date: OT eval and treat 25     Diagnosis: Aortic stenosis [I35.0]  Severe aortic stenosis [I35.0]   Pt admitted to hospital with syncope and tachycardia      Surgery / Procedure: heart cath 25 and 25      Pertinent Medical History:  has a past medical history of A-fib ([facility]), A-fib ([facility]), Arthritis, CAD (coronary artery disease), CHF (congestive heart failure) ([facility]), COPD with acute exacerbation ([facility]), Diastolic heart failure ([facility]), Hyperlipidemia, Hypertension, Infrarenal abdominal aortic aneurysm (AAA) without rupture, and Unspecified cerebral artery occlusion with cerebral infarction.         Precautions:  Fall Risk, orthostatic hypotension, bed alarm      Assessment of current deficits    [x] Functional mobility          [x]ADLs           [x] Strength                  []Cognition    [x] Functional transfers        [x] IADLs         [x] Safety Awareness   [x]Endurance    [] Fine Coordination                        [x] Balance      [] Vision/perception   []Sensation      []Gross Motor Coordination            [] ROM           [] Delirium                   [] Motor Control      OT PLAN OF CARE   OT POC based on physician orders, patient diagnosis and results of clinical assessment     Frequency/Duration 1-5 days/wk for 2 weeks PRN   Specific OT Treatment Interventions to include:   * Instruction/training on adapted ADL techniques and AE recommendations to increase functional independence within precautions       * Training on energy conservation strategies, correct breathing pattern and techniques to improve independence/tolerance    ADL/Home Mgt [id number]   Neuro Re-ed [id number]     Group Therapy      Orthotic manage/training  [id number]     Non-Billable Time     Total Timed Treatment 38 3     Rianna Rulf NIX/L [id number]

## 2025-05-05 NOTE — PROGRESS NOTES
This HUC registered zio patch in zio suite at this time serial number [id number]. ANA arteaga will place and give instructions

## 2025-05-05 NOTE — PROGRESS NOTES
[facility]    Subjective:  pt alert conversive pt agreeable to tavr and AAA intervention      Current Facility-Administered Medications:     atorvastatin (LIPITOR) tablet 40 mg, 40 mg, Oral, Nightly, Crow Luther MD    enoxaparin (LOVENOX) injection 40 mg, 40 mg, SubCUTAneous, Daily, Raulito Cadena MD, 40 mg at 05/04/25 0951    sodium chloride flush 0.9 % injection 5-40 mL, 5-40 mL, IntraVENous, 2 times per day, Raulito Cadena MD, 10 mL at 05/03/25 2106    sodium chloride flush 0.9 % injection 5-40 mL, 5-40 mL, IntraVENous, PRN, Raulito Cadena MD    0.9 % sodium chloride infusion, , IntraVENous, PRN, Raulito Cadena MD    acetaminophen (TYLENOL) tablet 650 mg, 650 mg, Oral, Q4H PRN, Raulito Cadena MD    sodium chloride flush 0.9 % injection 10 mL, 10 mL, IntraVENous, Once PRN, Lovely Thompson MD    clopidogrel (PLAVIX) tablet 75 mg, 75 mg, Oral, Daily, Raulito Cadena MD, 75 mg at 05/04/25 0951    aspirin EC tablet 81 mg, 81 mg, Oral, Daily, Raulito Cadena MD, 81 mg at 05/04/25 0951    sodium chloride flush 0.9 % injection 5-40 mL, 5-40 mL, IntraVENous, 2 times per day, Raulito Cadena MD, 10 mL at 05/03/25 2106    sodium chloride flush 0.9 % injection 5-40 mL, 5-40 mL, IntraVENous, PRN, Raulito Cadena MD    0.9 % sodium chloride infusion, , IntraVENous, PRN, Raulito Cadena MD    guaiFENesin tablet 400 mg, 400 mg, Oral, BID PRN, Steven Nava DO, 400 mg at 05/02/25 1832    sodium chloride flush 0.9 % injection 10 mL, 10 mL, IntraVENous, 2 times per day, Nikki Cardoso APRN - CNP, 10 mL at 05/04/25 2139    sodium chloride flush 0.9 % injection 10 mL, 10 mL, IntraVENous, PRN, Nikki Cardoso APRN - CNP    0.9 % sodium chloride infusion, , IntraVENous, PRN, Nikki Cardoso, KOSTA - CNP    ondansetron (ZOFRAN-ODT) disintegrating tablet 4 mg, 4 mg, Oral, Q8H PRN **OR** ondansetron (ZOFRAN) injection 4 mg, 4 mg, IntraVENous, Q6H PRN, Nikki Cardoso APRN - CNP    senna (SENOKOT) tablet 8.6 mg, 1 tablet,

## 2025-05-06 ENCOUNTER — PREP FOR PROCEDURE (OUTPATIENT)
Dept: VASCULAR SURGERY | Age: 85
End: 2025-05-06

## 2025-05-06 ENCOUNTER — TELEPHONE (OUTPATIENT)
Dept: VASCULAR SURGERY | Age: 85
End: 2025-05-06

## 2025-05-06 LAB
EKG ATRIAL RATE: 54 BPM
EKG Q-T INTERVAL: 510 MS
EKG QRS DURATION: 140 MS
EKG QTC CALCULATION (BAZETT): 487 MS
EKG R AXIS: -26 DEGREES
EKG T AXIS: -25 DEGREES
EKG VENTRICULAR RATE: 55 BPM

## 2025-05-06 NOTE — TELEPHONE ENCOUNTER
Spoke with the pt, scheduled EVAR with Dr. Marmolejo 5/16/25.  Per pt's request, message left on Lisette zamora' voicemail for a return call for instructions.

## 2025-05-07 LAB
EKG ATRIAL RATE: 394 BPM
EKG Q-T INTERVAL: 464 MS
EKG QRS DURATION: 146 MS
EKG QTC CALCULATION (BAZETT): 490 MS
EKG R AXIS: 7 DEGREES
EKG T AXIS: -36 DEGREES
EKG VENTRICULAR RATE: 67 BPM

## 2025-05-09 LAB
EKG ATRIAL RATE: 208 BPM
EKG Q-T INTERVAL: 454 MS
EKG QRS DURATION: 146 MS
EKG QTC CALCULATION (BAZETT): 500 MS
EKG R AXIS: 6 DEGREES
EKG T AXIS: -81 DEGREES
EKG VENTRICULAR RATE: 73 BPM

## 2025-05-13 ENCOUNTER — HOSPITAL ENCOUNTER (OUTPATIENT)
Dept: PREADMISSION TESTING | Age: 85
Setting detail: SURGERY ADMIT
Discharge: HOME | End: 2025-05-13
Payer: MEDICARE

## 2025-05-13 VITALS
BODY MASS INDEX: 22.6 KG/M2 | SYSTOLIC BLOOD PRESSURE: 142 MMHG | WEIGHT: 144 LBS | OXYGEN SATURATION: 98 % | TEMPERATURE: 98 F | HEART RATE: 78 BPM | HEIGHT: 67 IN | DIASTOLIC BLOOD PRESSURE: 78 MMHG | RESPIRATION RATE: 16 BRPM

## 2025-05-13 DIAGNOSIS — Z01.812 PRE-OPERATIVE LABORATORY EXAMINATION: ICD-10-CM

## 2025-05-13 DIAGNOSIS — I71.43 INFRARENAL ABDOMINAL AORTIC ANEURYSM (AAA) WITHOUT RUPTURE: Chronic | ICD-10-CM

## 2025-05-13 LAB
ABO + RH BLD: NORMAL
ANION GAP SERPL CALCULATED.3IONS-SCNC: 9 MMOL/L (ref 7–16)
ARM BAND NUMBER: NORMAL
BLOOD BANK SAMPLE EXPIRATION: NORMAL
BLOOD GROUP ANTIBODIES SERPL: NEGATIVE
BUN SERPL-MCNC: 18 MG/DL (ref 8–23)
CALCIUM SERPL-MCNC: 9.3 MG/DL (ref 8.8–10.2)
CHLORIDE SERPL-SCNC: 110 MMOL/L (ref 98–107)
CO2 SERPL-SCNC: 22 MMOL/L (ref 22–29)
CREAT SERPL-MCNC: 1.2 MG/DL (ref 0.7–1.2)
ERYTHROCYTE [DISTWIDTH] IN BLOOD BY AUTOMATED COUNT: 16.7 % (ref 11.5–15)
GFR, ESTIMATED: 61 ML/MIN/1.73M2
GLUCOSE SERPL-MCNC: 121 MG/DL (ref 74–99)
HCT VFR BLD AUTO: 29.5 % (ref 37–54)
HGB BLD-MCNC: 9.3 G/DL (ref 12.5–16.5)
INR PPP: 2
MCH RBC QN AUTO: 29.6 PG (ref 26–35)
MCHC RBC AUTO-ENTMCNC: 31.5 G/DL (ref 32–34.5)
MCV RBC AUTO: 93.9 FL (ref 80–99.9)
PARTIAL THROMBOPLASTIN TIME: 44.3 SEC (ref 24.5–35.1)
PLATELET # BLD AUTO: 305 K/UL (ref 130–450)
PMV BLD AUTO: 10.5 FL (ref 7–12)
POTASSIUM SERPL-SCNC: 4.4 MMOL/L (ref 3.5–5.1)
PROTHROMBIN TIME: 21.7 SEC (ref 9.3–12.4)
RBC # BLD AUTO: 3.14 M/UL (ref 3.8–5.8)
SODIUM SERPL-SCNC: 141 MMOL/L (ref 136–145)
WBC OTHER # BLD: 6.3 K/UL (ref 4.5–11.5)

## 2025-05-13 PROCEDURE — 85027 COMPLETE CBC AUTOMATED: CPT

## 2025-05-13 PROCEDURE — 86900 BLOOD TYPING SEROLOGIC ABO: CPT

## 2025-05-13 PROCEDURE — 86901 BLOOD TYPING SEROLOGIC RH(D): CPT

## 2025-05-13 PROCEDURE — 86850 RBC ANTIBODY SCREEN: CPT

## 2025-05-13 PROCEDURE — 80048 BASIC METABOLIC PNL TOTAL CA: CPT

## 2025-05-13 PROCEDURE — 36415 COLL VENOUS BLD VENIPUNCTURE: CPT

## 2025-05-13 PROCEDURE — 87081 CULTURE SCREEN ONLY: CPT

## 2025-05-13 PROCEDURE — 85610 PROTHROMBIN TIME: CPT

## 2025-05-13 PROCEDURE — 85730 THROMBOPLASTIN TIME PARTIAL: CPT

## 2025-05-14 LAB
MICROORGANISM SPEC CULT: NORMAL
SPECIMEN DESCRIPTION: NORMAL

## 2025-05-15 ENCOUNTER — ANESTHESIA EVENT (OUTPATIENT)
Dept: OPERATING ROOM | Age: 85
End: 2025-05-15
Payer: MEDICARE

## 2025-05-16 ENCOUNTER — HOSPITAL ENCOUNTER (INPATIENT)
Age: 85
LOS: 2 days | Discharge: HOME | DRG: 269 | End: 2025-05-18
Attending: SURGERY | Admitting: SURGERY
Payer: MEDICARE

## 2025-05-16 ENCOUNTER — ANESTHESIA (OUTPATIENT)
Dept: OPERATING ROOM | Age: 85
End: 2025-05-16
Payer: MEDICARE

## 2025-05-16 DIAGNOSIS — Z01.812 PRE-OPERATIVE LABORATORY EXAMINATION: ICD-10-CM

## 2025-05-16 DIAGNOSIS — I71.43 INFRARENAL ABDOMINAL AORTIC ANEURYSM (AAA) WITHOUT RUPTURE: Primary | Chronic | ICD-10-CM

## 2025-05-16 PROBLEM — I71.40 AAA (ABDOMINAL AORTIC ANEURYSM) WITHOUT RUPTURE: Status: ACTIVE | Noted: 2025-05-16

## 2025-05-16 LAB
ACTIVATED CLOTTING TIME, LOW RANGE: 180 SEC
ACTIVATED CLOTTING TIME, LOW RANGE: 285 SEC
ACTIVATED CLOTTING TIME, LOW RANGE: 305 SEC
B.E.: -8.8 MMOL/L (ref -3–3)
COHB: 0.5 % (ref 0–1.5)
CRITICAL: ABNORMAL
DATE ANALYZED: ABNORMAL
DATE OF COLLECTION: ABNORMAL
HCO3: 15.4 MMOL/L (ref 22–26)
HHB: 1.1 % (ref 0–5)
LAB: ABNORMAL
Lab: 1001
METHB: 0.3 % (ref 0–1.5)
MODE: ABNORMAL
O2 SATURATION: 98.9 % (ref 92–98.5)
O2HB: 98.1 % (ref 94–97)
OPERATOR ID: 421
PATIENT TEMP: 37 C
PCO2: 27.6 MMHG (ref 35–45)
PH BLOOD GAS: 7.36 (ref 7.35–7.45)
PO2: 169 MMHG (ref 75–100)
SOURCE, BLOOD GAS: ABNORMAL
THB: 10 G/DL (ref 11.5–16.5)
TIME ANALYZED: 1007

## 2025-05-16 PROCEDURE — 34705 EVAC RPR A-BIILIAC NDGFT: CPT | Performed by: SURGERY

## 2025-05-16 PROCEDURE — 2500000003 HC RX 250 WO HCPCS: Performed by: SURGERY

## 2025-05-16 PROCEDURE — 2000000000 HC ICU R&B

## 2025-05-16 PROCEDURE — 7100000000 HC PACU RECOVERY - FIRST 15 MIN: Performed by: SURGERY

## 2025-05-16 PROCEDURE — C1760 CLOSURE DEV, VASC: HCPCS | Performed by: SURGERY

## 2025-05-16 PROCEDURE — 6360000002 HC RX W HCPCS: Performed by: PHYSICIAN ASSISTANT

## 2025-05-16 PROCEDURE — 82805 BLOOD GASES W/O2 SATURATION: CPT

## 2025-05-16 PROCEDURE — C1768 GRAFT, VASCULAR: HCPCS | Performed by: SURGERY

## 2025-05-16 PROCEDURE — 2580000003 HC RX 258

## 2025-05-16 PROCEDURE — 3700000001 HC ADD 15 MINUTES (ANESTHESIA): Performed by: SURGERY

## 2025-05-16 PROCEDURE — 6360000002 HC RX W HCPCS

## 2025-05-16 PROCEDURE — 03HY32Z INSERTION OF MONITORING DEVICE INTO UPPER ARTERY, PERCUTANEOUS APPROACH: ICD-10-PCS | Performed by: ANESTHESIOLOGY

## 2025-05-16 PROCEDURE — 3600000007 HC SURGERY HYBRID BASE: Performed by: SURGERY

## 2025-05-16 PROCEDURE — C1725 CATH, TRANSLUMIN NON-LASER: HCPCS | Performed by: SURGERY

## 2025-05-16 PROCEDURE — 2500000003 HC RX 250 WO HCPCS: Performed by: PHYSICIAN ASSISTANT

## 2025-05-16 PROCEDURE — 6360000002 HC RX W HCPCS: Performed by: SURGERY

## 2025-05-16 PROCEDURE — 7100000001 HC PACU RECOVERY - ADDTL 15 MIN: Performed by: SURGERY

## 2025-05-16 PROCEDURE — 85347 COAGULATION TIME ACTIVATED: CPT

## 2025-05-16 PROCEDURE — 2580000003 HC RX 258: Performed by: SURGERY

## 2025-05-16 PROCEDURE — 34713 PERQ ACCESS & CLSR FEM ART: CPT | Performed by: SURGERY

## 2025-05-16 PROCEDURE — 3700000000 HC ANESTHESIA ATTENDED CARE: Performed by: SURGERY

## 2025-05-16 PROCEDURE — 6360000004 HC RX CONTRAST MEDICATION: Performed by: SURGERY

## 2025-05-16 PROCEDURE — C1894 INTRO/SHEATH, NON-LASER: HCPCS | Performed by: SURGERY

## 2025-05-16 PROCEDURE — 2709999900 HC NON-CHARGEABLE SUPPLY: Performed by: SURGERY

## 2025-05-16 PROCEDURE — C2628 CATHETER, OCCLUSION: HCPCS | Performed by: SURGERY

## 2025-05-16 PROCEDURE — 04V03DZ RESTRICTION OF ABDOMINAL AORTA WITH INTRALUMINAL DEVICE, PERCUTANEOUS APPROACH: ICD-10-PCS | Performed by: SURGERY

## 2025-05-16 PROCEDURE — 2580000003 HC RX 258: Performed by: PHYSICIAN ASSISTANT

## 2025-05-16 PROCEDURE — C1769 GUIDE WIRE: HCPCS | Performed by: SURGERY

## 2025-05-16 PROCEDURE — 3600000017 HC SURGERY HYBRID ADDL 15MIN: Performed by: SURGERY

## 2025-05-16 DEVICE — ANGIO-SEAL VIP VASCULAR CLOSURE DEVICE
Type: IMPLANTABLE DEVICE | Site: GROIN | Status: FUNCTIONAL
Brand: ANGIO-SEAL

## 2025-05-16 DEVICE — EXCLUDER AAA ENDO TRNK IPSI 26MMX14.5MMX14CM 16FR
Type: IMPLANTABLE DEVICE | Site: AORTA | Status: FUNCTIONAL
Brand: GORE EXCLUDER AAA ENDOPROSTHESIS WITH C3 DELIVERY

## 2025-05-16 DEVICE — EXCLUDER AAA ENDO ILIAC EXT 16MMX14.5MMX7CM 12FR
Type: IMPLANTABLE DEVICE | Site: AORTA | Status: FUNCTIONAL
Brand: GORE EXCLUDER AAA ENDOPROSTHESIS

## 2025-05-16 RX ORDER — ASPIRIN 81 MG/1
81 TABLET ORAL DAILY
Status: DISCONTINUED | OUTPATIENT
Start: 2025-05-17 | End: 2025-05-18 | Stop reason: HOSPADM

## 2025-05-16 RX ORDER — SODIUM CHLORIDE 0.9 % (FLUSH) 0.9 %
5-40 SYRINGE (ML) INJECTION PRN
Status: DISCONTINUED | OUTPATIENT
Start: 2025-05-16 | End: 2025-05-18 | Stop reason: HOSPADM

## 2025-05-16 RX ORDER — HYDROMORPHONE HYDROCHLORIDE 1 MG/ML
0.25 INJECTION, SOLUTION INTRAMUSCULAR; INTRAVENOUS; SUBCUTANEOUS EVERY 5 MIN PRN
Status: DISCONTINUED | OUTPATIENT
Start: 2025-05-16 | End: 2025-05-16 | Stop reason: HOSPADM

## 2025-05-16 RX ORDER — PROPOFOL 10 MG/ML
INJECTION, EMULSION INTRAVENOUS
Status: DISCONTINUED | OUTPATIENT
Start: 2025-05-16 | End: 2025-05-16 | Stop reason: SDUPTHER

## 2025-05-16 RX ORDER — SODIUM CHLORIDE 9 MG/ML
INJECTION, SOLUTION INTRAVENOUS PRN
Status: DISCONTINUED | OUTPATIENT
Start: 2025-05-16 | End: 2025-05-18 | Stop reason: HOSPADM

## 2025-05-16 RX ORDER — SODIUM CHLORIDE 9 MG/ML
INJECTION, SOLUTION INTRAVENOUS PRN
Status: DISCONTINUED | OUTPATIENT
Start: 2025-05-16 | End: 2025-05-16

## 2025-05-16 RX ORDER — NALOXONE HYDROCHLORIDE 0.4 MG/ML
INJECTION, SOLUTION INTRAMUSCULAR; INTRAVENOUS; SUBCUTANEOUS PRN
Status: DISCONTINUED | OUTPATIENT
Start: 2025-05-16 | End: 2025-05-16 | Stop reason: HOSPADM

## 2025-05-16 RX ORDER — DIPHENHYDRAMINE HYDROCHLORIDE 50 MG/ML
12.5 INJECTION, SOLUTION INTRAMUSCULAR; INTRAVENOUS
Status: DISCONTINUED | OUTPATIENT
Start: 2025-05-16 | End: 2025-05-16 | Stop reason: HOSPADM

## 2025-05-16 RX ORDER — HEPARIN SODIUM 1000 [USP'U]/ML
INJECTION, SOLUTION INTRAVENOUS; SUBCUTANEOUS
Status: DISCONTINUED | OUTPATIENT
Start: 2025-05-16 | End: 2025-05-16 | Stop reason: SDUPTHER

## 2025-05-16 RX ORDER — LABETALOL HYDROCHLORIDE 5 MG/ML
5 INJECTION, SOLUTION INTRAVENOUS
Status: DISCONTINUED | OUTPATIENT
Start: 2025-05-16 | End: 2025-05-16 | Stop reason: HOSPADM

## 2025-05-16 RX ORDER — SODIUM CHLORIDE 0.9 % (FLUSH) 0.9 %
5-40 SYRINGE (ML) INJECTION EVERY 12 HOURS SCHEDULED
Status: DISCONTINUED | OUTPATIENT
Start: 2025-05-16 | End: 2025-05-18 | Stop reason: HOSPADM

## 2025-05-16 RX ORDER — MIDAZOLAM HYDROCHLORIDE 2 MG/2ML
2 INJECTION, SOLUTION INTRAMUSCULAR; INTRAVENOUS
Status: DISCONTINUED | OUTPATIENT
Start: 2025-05-16 | End: 2025-05-16 | Stop reason: HOSPADM

## 2025-05-16 RX ORDER — SODIUM CHLORIDE 0.9 % (FLUSH) 0.9 %
5-40 SYRINGE (ML) INJECTION PRN
Status: DISCONTINUED | OUTPATIENT
Start: 2025-05-16 | End: 2025-05-16

## 2025-05-16 RX ORDER — IPRATROPIUM BROMIDE AND ALBUTEROL SULFATE 2.5; .5 MG/3ML; MG/3ML
1 SOLUTION RESPIRATORY (INHALATION)
Status: DISCONTINUED | OUTPATIENT
Start: 2025-05-16 | End: 2025-05-16 | Stop reason: HOSPADM

## 2025-05-16 RX ORDER — DROPERIDOL 2.5 MG/ML
0.62 INJECTION, SOLUTION INTRAMUSCULAR; INTRAVENOUS
Status: DISCONTINUED | OUTPATIENT
Start: 2025-05-16 | End: 2025-05-16 | Stop reason: HOSPADM

## 2025-05-16 RX ORDER — SODIUM CHLORIDE, SODIUM GLUCONATE, SODIUM ACETATE, POTASSIUM CHLORIDE AND MAGNESIUM CHLORIDE 526; 502; 368; 37; 30 MG/100ML; MG/100ML; MG/100ML; MG/100ML; MG/100ML
INJECTION, SOLUTION INTRAVENOUS
Status: DISCONTINUED | OUTPATIENT
Start: 2025-05-16 | End: 2025-05-16 | Stop reason: SDUPTHER

## 2025-05-16 RX ORDER — CLOPIDOGREL BISULFATE 75 MG/1
75 TABLET ORAL DAILY
Status: DISCONTINUED | OUTPATIENT
Start: 2025-05-17 | End: 2025-05-18 | Stop reason: HOSPADM

## 2025-05-16 RX ORDER — FENTANYL CITRATE 50 UG/ML
INJECTION, SOLUTION INTRAMUSCULAR; INTRAVENOUS
Status: DISCONTINUED | OUTPATIENT
Start: 2025-05-16 | End: 2025-05-16 | Stop reason: SDUPTHER

## 2025-05-16 RX ORDER — SODIUM CHLORIDE 0.9 % (FLUSH) 0.9 %
5-40 SYRINGE (ML) INJECTION PRN
Status: DISCONTINUED | OUTPATIENT
Start: 2025-05-16 | End: 2025-05-16 | Stop reason: HOSPADM

## 2025-05-16 RX ORDER — GLYCOPYRROLATE 0.2 MG/ML
INJECTION INTRAMUSCULAR; INTRAVENOUS
Status: DISCONTINUED | OUTPATIENT
Start: 2025-05-16 | End: 2025-05-16 | Stop reason: SDUPTHER

## 2025-05-16 RX ORDER — HYDRALAZINE HYDROCHLORIDE 20 MG/ML
5 INJECTION INTRAMUSCULAR; INTRAVENOUS
Status: DISCONTINUED | OUTPATIENT
Start: 2025-05-16 | End: 2025-05-16 | Stop reason: HOSPADM

## 2025-05-16 RX ORDER — SODIUM CHLORIDE 9 MG/ML
INJECTION, SOLUTION INTRAVENOUS CONTINUOUS
Status: DISCONTINUED | OUTPATIENT
Start: 2025-05-16 | End: 2025-05-16

## 2025-05-16 RX ORDER — HYDRALAZINE HYDROCHLORIDE 20 MG/ML
10 INJECTION INTRAMUSCULAR; INTRAVENOUS EVERY 10 MIN PRN
Status: DISCONTINUED | OUTPATIENT
Start: 2025-05-16 | End: 2025-05-17

## 2025-05-16 RX ORDER — ONDANSETRON 2 MG/ML
4 INJECTION INTRAMUSCULAR; INTRAVENOUS
Status: DISCONTINUED | OUTPATIENT
Start: 2025-05-16 | End: 2025-05-16 | Stop reason: HOSPADM

## 2025-05-16 RX ORDER — SODIUM CHLORIDE 0.9 % (FLUSH) 0.9 %
5-40 SYRINGE (ML) INJECTION EVERY 12 HOURS SCHEDULED
Status: DISCONTINUED | OUTPATIENT
Start: 2025-05-16 | End: 2025-05-16

## 2025-05-16 RX ORDER — IOPAMIDOL 755 MG/ML
INJECTION, SOLUTION INTRAVASCULAR PRN
Status: DISCONTINUED | OUTPATIENT
Start: 2025-05-16 | End: 2025-05-16 | Stop reason: ALTCHOICE

## 2025-05-16 RX ORDER — LABETALOL HYDROCHLORIDE 5 MG/ML
10 INJECTION, SOLUTION INTRAVENOUS EVERY 10 MIN PRN
Status: DISCONTINUED | OUTPATIENT
Start: 2025-05-16 | End: 2025-05-17

## 2025-05-16 RX ORDER — MIDAZOLAM HYDROCHLORIDE 1 MG/ML
INJECTION, SOLUTION INTRAMUSCULAR; INTRAVENOUS
Status: DISCONTINUED | OUTPATIENT
Start: 2025-05-16 | End: 2025-05-16 | Stop reason: SDUPTHER

## 2025-05-16 RX ORDER — ONDANSETRON 2 MG/ML
INJECTION INTRAMUSCULAR; INTRAVENOUS
Status: DISCONTINUED | OUTPATIENT
Start: 2025-05-16 | End: 2025-05-16 | Stop reason: SDUPTHER

## 2025-05-16 RX ORDER — HYDROMORPHONE HYDROCHLORIDE 1 MG/ML
0.5 INJECTION, SOLUTION INTRAMUSCULAR; INTRAVENOUS; SUBCUTANEOUS EVERY 5 MIN PRN
Status: DISCONTINUED | OUTPATIENT
Start: 2025-05-16 | End: 2025-05-16 | Stop reason: HOSPADM

## 2025-05-16 RX ORDER — SODIUM CHLORIDE 0.9 % (FLUSH) 0.9 %
5-40 SYRINGE (ML) INJECTION EVERY 12 HOURS SCHEDULED
Status: DISCONTINUED | OUTPATIENT
Start: 2025-05-16 | End: 2025-05-16 | Stop reason: HOSPADM

## 2025-05-16 RX ORDER — SODIUM CHLORIDE 9 MG/ML
INJECTION, SOLUTION INTRAVENOUS PRN
Status: DISCONTINUED | OUTPATIENT
Start: 2025-05-16 | End: 2025-05-16 | Stop reason: HOSPADM

## 2025-05-16 RX ORDER — PROTAMINE SULFATE 10 MG/ML
INJECTION, SOLUTION INTRAVENOUS
Status: DISCONTINUED | OUTPATIENT
Start: 2025-05-16 | End: 2025-05-16 | Stop reason: SDUPTHER

## 2025-05-16 RX ORDER — ATORVASTATIN CALCIUM 40 MG/1
40 TABLET, FILM COATED ORAL NIGHTLY
Status: DISCONTINUED | OUTPATIENT
Start: 2025-05-17 | End: 2025-05-18 | Stop reason: HOSPADM

## 2025-05-16 RX ORDER — SODIUM CHLORIDE 9 MG/ML
INJECTION, SOLUTION INTRAVENOUS CONTINUOUS
Status: ACTIVE | OUTPATIENT
Start: 2025-05-16 | End: 2025-05-17

## 2025-05-16 RX ORDER — DEXAMETHASONE SODIUM PHOSPHATE 10 MG/ML
INJECTION, SOLUTION INTRA-ARTICULAR; INTRALESIONAL; INTRAMUSCULAR; INTRAVENOUS; SOFT TISSUE
Status: DISCONTINUED | OUTPATIENT
Start: 2025-05-16 | End: 2025-05-16 | Stop reason: SDUPTHER

## 2025-05-16 RX ORDER — ACETAMINOPHEN 325 MG/1
650 TABLET ORAL
Status: DISCONTINUED | OUTPATIENT
Start: 2025-05-16 | End: 2025-05-16 | Stop reason: HOSPADM

## 2025-05-16 RX ORDER — OXYCODONE HYDROCHLORIDE 5 MG/1
5 TABLET ORAL
Status: DISCONTINUED | OUTPATIENT
Start: 2025-05-16 | End: 2025-05-18 | Stop reason: HOSPADM

## 2025-05-16 RX ORDER — PANTOPRAZOLE SODIUM 40 MG/1
40 TABLET, DELAYED RELEASE ORAL
Status: DISCONTINUED | OUTPATIENT
Start: 2025-05-17 | End: 2025-05-18 | Stop reason: HOSPADM

## 2025-05-16 RX ADMIN — DEXAMETHASONE SODIUM PHOSPHATE 10 MG: 10 INJECTION INTRAMUSCULAR; INTRAVENOUS at 07:30

## 2025-05-16 RX ADMIN — HYDRALAZINE HYDROCHLORIDE 10 MG: 20 INJECTION INTRAMUSCULAR; INTRAVENOUS at 20:35

## 2025-05-16 RX ADMIN — HEPARIN SODIUM 6000 UNITS: 1000 INJECTION INTRAVENOUS; SUBCUTANEOUS at 08:17

## 2025-05-16 RX ADMIN — SODIUM CHLORIDE: 9 INJECTION, SOLUTION INTRAVENOUS at 07:30

## 2025-05-16 RX ADMIN — SODIUM CHLORIDE: 0.9 INJECTION, SOLUTION INTRAVENOUS at 17:15

## 2025-05-16 RX ADMIN — SODIUM CHLORIDE: 0.9 INJECTION, SOLUTION INTRAVENOUS at 13:41

## 2025-05-16 RX ADMIN — GLYCOPYRROLATE 0.2 MG: 0.2 INJECTION INTRAMUSCULAR; INTRAVENOUS at 09:07

## 2025-05-16 RX ADMIN — FENTANYL CITRATE 25 MCG: 50 INJECTION, SOLUTION INTRAMUSCULAR; INTRAVENOUS at 07:39

## 2025-05-16 RX ADMIN — SODIUM CHLORIDE, SODIUM GLUCONATE, SODIUM ACETATE, POTASSIUM CHLORIDE AND MAGNESIUM CHLORIDE: 526; 502; 368; 37; 30 INJECTION, SOLUTION INTRAVENOUS at 07:30

## 2025-05-16 RX ADMIN — PROPOFOL 30 MCG/KG/MIN: 10 INJECTION, EMULSION INTRAVENOUS at 07:30

## 2025-05-16 RX ADMIN — FENTANYL CITRATE 50 MCG: 50 INJECTION, SOLUTION INTRAMUSCULAR; INTRAVENOUS at 09:07

## 2025-05-16 RX ADMIN — CEFAZOLIN 2000 MG: 1 INJECTION, POWDER, FOR SOLUTION INTRAMUSCULAR; INTRAVENOUS at 07:30

## 2025-05-16 RX ADMIN — MIDAZOLAM 1 MG: 1 INJECTION INTRAMUSCULAR; INTRAVENOUS at 09:07

## 2025-05-16 RX ADMIN — ONDANSETRON HYDROCHLORIDE 4 MG: 2 SOLUTION INTRAMUSCULAR; INTRAVENOUS at 08:44

## 2025-05-16 RX ADMIN — MIDAZOLAM 1 MG: 1 INJECTION INTRAMUSCULAR; INTRAVENOUS at 07:30

## 2025-05-16 RX ADMIN — FENTANYL CITRATE 25 MCG: 50 INJECTION, SOLUTION INTRAMUSCULAR; INTRAVENOUS at 08:27

## 2025-05-16 RX ADMIN — SODIUM CHLORIDE 2 MG/HR: 9 INJECTION, SOLUTION INTRAVENOUS at 10:17

## 2025-05-16 RX ADMIN — HYDRALAZINE HYDROCHLORIDE 10 MG: 20 INJECTION INTRAMUSCULAR; INTRAVENOUS at 23:10

## 2025-05-16 RX ADMIN — SODIUM CHLORIDE, PRESERVATIVE FREE 10 ML: 5 INJECTION INTRAVENOUS at 20:35

## 2025-05-16 RX ADMIN — PROTAMINE SULFATE 50 MG: 10 INJECTION, SOLUTION INTRAVENOUS at 09:15

## 2025-05-16 ASSESSMENT — PAIN SCALES - GENERAL
PAINLEVEL_OUTOF10: 0

## 2025-05-16 ASSESSMENT — PAIN - FUNCTIONAL ASSESSMENT
PAIN_FUNCTIONAL_ASSESSMENT: NONE - DENIES PAIN
PAIN_FUNCTIONAL_ASSESSMENT: NONE - DENIES PAIN

## 2025-05-16 ASSESSMENT — ENCOUNTER SYMPTOMS: SHORTNESS OF BREATH: 1

## 2025-05-16 NOTE — ANESTHESIA PROCEDURE NOTES
Arterial Line:    An arterial line was placed using surface landmarks, in the OR for the following indication(s): continuous blood pressure monitoring, blood sampling needed and unable to use non-invasive cuff.    A 20 gauge (size), 1 and 3/8 inch (length), Arrow (type) catheter was placed, Seldinger technique used, into the radial artery, secured by Tegaderm and tape.  Anesthesia type: Local  Local infiltration: Injection    Events:  patient tolerated procedure well with no complications and EBL < 5mL.  Resident/CRNA: Nicho Davila APRN - CRNA  Other anesthesia staff: Paul Silvestre RN  Performed: Other anesthesia staff and Other staff   Preanesthetic Checklist  Completed: patient identified, IV checked, site marked, risks and benefits discussed, surgical/procedural consents, equipment checked, pre-op evaluation, timeout performed, anesthesia consent given, oxygen available, monitors applied/VS acknowledged, fire risk safety assessment completed and verbalized and blood product R/B/A discussed and consented

## 2025-05-16 NOTE — OP NOTE
identified a widely patent stent graft and patent renal arteries bilaterally. Both hypogastric arteries were patent bilaterally.  There was no evidence of an endovascular leak. The sheaths were removed over wires without hemodynamic compromise. The sheaths were removed and the Perclose sutures were tightened.  An 8 Sinhala Angio-Seal was deployed in the left femoral artery due to failure of one of the Perclose sutures.  Doppler signals were appreciated in both feet, and then the patient was given protamine and hemostasis was obtained. The incisions were irrigated with saline solution. The incisions were approximated with multiple layers of Vicryl suture and Dermabond was applied to the skin incisions in the operating room. Needle, sponge and instrument counts were reported correct x2. The patient tolerated the procedure and was transferred to the CV-ICU in satisfactory condition.    Farrukh Marmolejo MD    CPT CODES: Endovascular repair of abdominal aortic aneurysm using modular stent graft (59335), percutaneous access and closure right femoral artery (52935), percutaneous access and closure left femoral artery (30681).     Electronically signed by Farrukh Marmolejo MD on 5/16/2025 at 9:21 AM

## 2025-05-16 NOTE — H&P
Vascular Surgery History & Physical Exam      Chief Complaint: AAA    HISTORY OF PRESENT ILLNESS:                The patient is a 85 y.o. male who presents to the hospital for elective AAA repair.  He denies any problems since the last office visit.  He is accompanied by his son.    IMPRESSION:   Active Hospital Problems    Diagnosis     AAA (abdominal aortic aneurysm) without rupture [I71.40]     Infrarenal abdominal aortic aneurysm (AAA) without rupture [I71.43]        PLAN:  EVAR.    I reviewed the procedure with the patient.  I discussed the risks, benefits, and alternatives of the procedure.  The patient understands and consents.  All questions were answered.        Past Medical History:   Diagnosis Date    A-fib ([facility])     A-fib ([facility])     presently on coumadin    Arthritis     CAD (coronary artery disease)     CHF (congestive heart failure) ([facility])     COPD with acute exacerbation ([facility]) 11/26/2022    Diastolic heart failure ([facility]) 07/23/2014 7/23/14- echocardiogram revealed an LVEF of 55-60%, left atrium severely dilated, mild mitral and mild tricuspid regurgitation    Hyperlipidemia     Hypertension     Infrarenal abdominal aortic aneurysm (AAA) without rupture 04/30/2025    Unspecified cerebral artery occlusion with cerebral infarction     Son States no residual        Past Surgical History:   Procedure Laterality Date    ABDOMEN SURGERY      PERFORATED BOWEL    APPENDECTOMY      CARDIAC PROCEDURE N/A 4/28/2025    Left heart cath / coronary angiography [place] [facility] [place] [facility] [facility]    CARDIAC PROCEDURE N/A 4/30/2025    Percutaneous coronary intervention performed [place] [facility]    CARDIAC PROCEDURE N/A 4/30/2025    Insert stent awilda coronary performed [place]ul[place] [facility]    CARDIOVERSION  11-    Dr. Layne-successful 200 joules    CAROTID ENDARTERECTOMY Left 04/16/2015    Vasoguard patch, Delatore    COLONOSCOPY

## 2025-05-16 NOTE — ANESTHESIA POSTPROCEDURE EVALUATION
Department of Anesthesiology  Postprocedure Note    Patient: Trace Nassar  MRN: [id number]  YOB: 1940  Date of evaluation: 5/16/2025    Procedure Summary       Date: 05/16/25 Room / Location: [address]    Anesthesia Start: 0719 Anesthesia Stop: 0934    Procedure: ENDOVASCULAR REPAIR ABDOMINAL AORTIC ANEURYSM Diagnosis:       Infrarenal abdominal aortic aneurysm (AAA) without rupture      (Infrarenal abdominal aortic aneurysm (AAA) without rupture [I71.43])    Surgeons: Farrukh Marmolejo MD Responsible Provider: Jaja Waller MD    Anesthesia Type: MAC ASA Status: 4            Anesthesia Type: MAC    Lila Phase I: Lila Score: 10    Lila Phase II:      Anesthesia Post Evaluation    Patient location during evaluation: PACU  Patient participation: complete - patient participated  Level of consciousness: awake and alert  Airway patency: patent  Nausea & Vomiting: no nausea and no vomiting  Cardiovascular status: blood pressure returned to baseline and hemodynamically stable  Respiratory status: acceptable and spontaneous ventilation  Hydration status: euvolemic  Multimodal analgesia pain management approach  Pain management: adequate    No notable events documented.

## 2025-05-16 NOTE — PLAN OF CARE
Problem: Chronic Conditions and Co-morbidities  Goal: Patient's chronic conditions and co-morbidity symptoms are monitored and maintained or improved  Outcome: Progressing  Flowsheets (Taken 5/16/2025 1330)  Care Plan - Patient's Chronic Conditions and Co-Morbidity Symptoms are Monitored and Maintained or Improved: Monitor and assess patient's chronic conditions and comorbid symptoms for stability, deterioration, or improvement     Problem: Discharge Planning  Goal: Discharge to home or other facility with appropriate resources  Outcome: Progressing  Flowsheets (Taken 5/16/2025 1330)  Discharge to home or other facility with appropriate resources: Identify barriers to discharge with patient and caregiver     Problem: Safety - Adult  Goal: Free from fall injury  Outcome: Progressing  Flowsheets (Taken 5/16/2025 1330)  Free From Fall Injury: Instruct family/caregiver on patient safety     Problem: Pain  Goal: Verbalizes/displays adequate comfort level or baseline comfort level  Outcome: Progressing  Flowsheets (Taken 5/16/2025 1330)  Verbalizes/displays adequate comfort level or baseline comfort level:   Encourage patient to monitor pain and request assistance   Assess pain using appropriate pain scale   Administer analgesics based on type and severity of pain and evaluate response   Implement non-pharmacological measures as appropriate and evaluate response     Problem: Skin/Tissue Integrity  Goal: Skin integrity remains intact  Description: 1.  Monitor for areas of redness and/or skin breakdown2.  Assess vascular access sites hourly3.  Every 4-6 hours minimum:  Change oxygen saturation probe site4.  Every 4-6 hours:  If on nasal continuous positive airway pressure, respiratory therapy assess nares and determine need for appliance change or resting period  Outcome: Progressing  Flowsheets (Taken 5/16/2025 1330)  Skin Integrity Remains Intact:   Monitor for areas of redness and/or skin breakdown   Assess vascular

## 2025-05-16 NOTE — ANESTHESIA PRE PROCEDURE
Department of Anesthesiology  Preprocedure Note       Name:  Trace Nassar   Age:  85 y.o.  :  1940                                          MRN:  [id number]         Date:  2025      Surgeon: Surgeon(s):  Farrukh Marmolejo MD    Procedure: Procedure(s):  ENDOVASCULAR REPAIR ABDOMINAL AORTIC ANEURYSM    Medications prior to admission:   Prior to Admission medications    Medication Sig Start Date End Date Taking? Authorizing Provider   aspirin EC 81 MG EC tablet Take 1 tablet by mouth daily 25  Yes Steven Nava DO   atorvastatin (LIPITOR) 40 MG tablet Take 1 tablet by mouth nightly 25  Yes Steven Nava DO   clopidogrel (PLAVIX) 75 MG tablet Take 1 tablet by mouth daily 25  Yes Steven Nava DO   guaiFENesin 400 MG tablet Take 1 tablet by mouth 2 times daily as needed for Cough   Yes Provider, MD Wu   Vitamin D (CHOLECALCIFEROL) 1000 UNITS CAPS capsule Take 1 capsule by mouth daily   Yes ProviderWu MD   apixaban (ELIQUIS) 5 MG TABS tablet Take 1 tablet by mouth 2 times daily 25   Steven Nava DO   pantoprazole (PROTONIX) 40 MG tablet Take 1 tablet by mouth every morning (before breakfast).  Patient not taking: Reported on 2025   Ren Brooke DO       Current medications:    Current Facility-Administered Medications   Medication Dose Route Frequency Provider Last Rate Last Admin    0.9 % sodium chloride infusion   IntraVENous Continuous Vanda Valdivia PA-C        sodium chloride flush 0.9 % injection 5-40 mL  5-40 mL IntraVENous 2 times per day Vanda Valdivia PA-C        sodium chloride flush 0.9 % injection 5-40 mL  5-40 mL IntraVENous PRN Vanda Valdivia PA-JESS        0.9 % sodium chloride infusion   IntraVENous PRN Vanda Valdivia PA-C        ceFAZolin (ANCEF) 2,000 mg in sterile water 20 mL IV syringe  2,000 mg IntraVENous See Admin Instructions Vanda Valdivia PA-C           Allergies:    Allergies

## 2025-05-16 NOTE — DISCHARGE INSTRUCTIONS
Discharge Instructions for Endovascular Repair of Abdominal Aortic Aneurysm       Call Dr. Marmolejo's office [phone] for follow-up appointment.    During endovascular repair, the doctor guides catheters through both groin arteries up to the aortic aneurysm in the abdomen. A stent graft is placed in the weakened area to strengthen it.   Steps to Take   Home Care    Follow these guidelines after surgery:   Rest. Try to move as tolerated. A mix of rest and light activity improves healing.   The incision area may be sore for a few days. To minimize pain and soreness:   Take pain medicine as directed.   Avoid strenuous activity and heavy lifting.   Keep the incision area clean and dry. Follow your doctor's instructions for changing the bandages, such as:   Wash your hands thoroughly.   Cleanse the site with lukewarm water and mild soap.   Use a soft wash cloth to gently wipe the incision area.   Gently sponge bathe or shower. Do not scrub the incision areas. Avoid baths or swimming for one week.   Diet    You can return to your regular diet. You may work with a dietician who will help you follow a heart-healthy diet.   Physical Activity    You will feel sore after the surgery. Try to walk steadily within two weeks. You may be able to return to normal activities within 1-3 weeks. While recovering, you will need to avoid strenuous activities, like heavy lifting.   Ask your doctor when you will be able to return to work.    Do not drive unless your doctor has given you permission to do so.    Medications    Your doctor may recommend:   Over-the-counter or prescription pain medicine   Aspirin or a cholesterol-lowering drug to prevent complications   If you had to stop medicines before the procedure, ask your doctor when you can start again. Medicines that may have been stopped include:   Anti-inflammatory drugs (eg, aspirin, ibuprofen)   Blood thinners, like warfarin (Coumadin)   Clopidogrel (Plavix)   When taking

## 2025-05-17 PROCEDURE — 2580000003 HC RX 258: Performed by: SURGERY

## 2025-05-17 PROCEDURE — 51798 US URINE CAPACITY MEASURE: CPT

## 2025-05-17 PROCEDURE — 37799 UNLISTED PX VASCULAR SURGERY: CPT

## 2025-05-17 PROCEDURE — 2500000003 HC RX 250 WO HCPCS: Performed by: SURGERY

## 2025-05-17 PROCEDURE — 6370000000 HC RX 637 (ALT 250 FOR IP): Performed by: SURGERY

## 2025-05-17 PROCEDURE — 2700000000 HC OXYGEN THERAPY PER DAY

## 2025-05-17 PROCEDURE — 2140000000 HC CCU INTERMEDIATE R&B

## 2025-05-17 RX ADMIN — SODIUM CHLORIDE: 0.9 INJECTION, SOLUTION INTRAVENOUS at 00:50

## 2025-05-17 RX ADMIN — PANTOPRAZOLE SODIUM 40 MG: 40 TABLET, DELAYED RELEASE ORAL at 06:14

## 2025-05-17 RX ADMIN — APIXABAN 5 MG: 5 TABLET, FILM COATED ORAL at 10:35

## 2025-05-17 RX ADMIN — ATORVASTATIN CALCIUM 40 MG: 40 TABLET, FILM COATED ORAL at 22:14

## 2025-05-17 RX ADMIN — SODIUM CHLORIDE, PRESERVATIVE FREE 10 ML: 5 INJECTION INTRAVENOUS at 10:35

## 2025-05-17 RX ADMIN — ASPIRIN 81 MG: 81 TABLET, COATED ORAL at 10:35

## 2025-05-17 RX ADMIN — APIXABAN 5 MG: 5 TABLET, FILM COATED ORAL at 22:14

## 2025-05-17 RX ADMIN — CLOPIDOGREL BISULFATE 75 MG: 75 TABLET, FILM COATED ORAL at 10:35

## 2025-05-17 RX ADMIN — SODIUM CHLORIDE, PRESERVATIVE FREE 10 ML: 5 INJECTION INTRAVENOUS at 22:15

## 2025-05-17 ASSESSMENT — PAIN SCALES - GENERAL
PAINLEVEL_OUTOF10: 0
PAINLEVEL_OUTOF10: 0

## 2025-05-17 NOTE — PLAN OF CARE
Problem: Chronic Conditions and Co-morbidities  Goal: Patient's chronic conditions and co-morbidity symptoms are monitored and maintained or improved  Outcome: Progressing     Problem: Discharge Planning  Goal: Discharge to home or other facility with appropriate resources  Outcome: Progressing     Problem: Safety - Adult  Goal: Free from fall injury  Outcome: Progressing     Problem: Pain  Goal: Verbalizes/displays adequate comfort level or baseline comfort level  Outcome: Progressing     Problem: Skin/Tissue Integrity  Goal: Skin integrity remains intact  Description: 1.  Monitor for areas of redness and/or skin breakdown2.  Assess vascular access sites hourly3.  Every 4-6 hours minimum:  Change oxygen saturation probe site4.  Every 4-6 hours:  If on nasal continuous positive airway pressure, respiratory therapy assess nares and determine need for appliance change or resting period  Outcome: Progressing

## 2025-05-17 NOTE — PLAN OF CARE
Problem: Chronic Conditions and Co-morbidities  Goal: Patient's chronic conditions and co-morbidity symptoms are monitored and maintained or improved  5/16/2025 2151 by Edgardo Lancaster RN  Outcome: Progressing  Flowsheets (Taken 5/16/2025 2000)  Care Plan - Patient's Chronic Conditions and Co-Morbidity Symptoms are Monitored and Maintained or Improved:   Monitor and assess patient's chronic conditions and comorbid symptoms for stability, deterioration, or improvement   Collaborate with multidisciplinary team to address chronic and comorbid conditions and prevent exacerbation or deterioration   Update acute care plan with appropriate goals if chronic or comorbid symptoms are exacerbated and prevent overall improvement and discharge     Problem: Discharge Planning  Goal: Discharge to home or other facility with appropriate resources  5/16/2025 2151 by Edgardo Lancaster RN  Outcome: Progressing  Flowsheets (Taken 5/16/2025 2000)  Discharge to home or other facility with appropriate resources:   Identify barriers to discharge with patient and caregiver   Identify discharge learning needs (meds, wound care, etc)     Problem: Metabolic/Fluid and Electrolytes - Adult  Goal: Electrolytes maintained within normal limits  5/16/2025 2151 by Edgardo Lancaster RN  Outcome: Progressing  Flowsheets (Taken 5/16/2025 2000)  Electrolytes maintained within normal limits:   Administer electrolyte replacement as ordered   Monitor labs and assess patient for signs and symptoms of electrolyte imbalances   Monitor response to electrolyte replacements, including repeat lab results as appropriate

## 2025-05-18 VITALS
WEIGHT: 138 LBS | HEART RATE: 93 BPM | OXYGEN SATURATION: 91 % | TEMPERATURE: 98.3 F | SYSTOLIC BLOOD PRESSURE: 134 MMHG | HEIGHT: 63 IN | RESPIRATION RATE: 16 BRPM | DIASTOLIC BLOOD PRESSURE: 94 MMHG | BODY MASS INDEX: 24.45 KG/M2

## 2025-05-18 PROCEDURE — 6370000000 HC RX 637 (ALT 250 FOR IP): Performed by: SURGERY

## 2025-05-18 PROCEDURE — 2500000003 HC RX 250 WO HCPCS: Performed by: SURGERY

## 2025-05-18 RX ADMIN — SODIUM CHLORIDE, PRESERVATIVE FREE 10 ML: 5 INJECTION INTRAVENOUS at 07:25

## 2025-05-18 RX ADMIN — CLOPIDOGREL BISULFATE 75 MG: 75 TABLET, FILM COATED ORAL at 07:25

## 2025-05-18 RX ADMIN — APIXABAN 5 MG: 5 TABLET, FILM COATED ORAL at 07:25

## 2025-05-18 RX ADMIN — PANTOPRAZOLE SODIUM 40 MG: 40 TABLET, DELAYED RELEASE ORAL at 05:39

## 2025-05-18 RX ADMIN — ASPIRIN 81 MG: 81 TABLET, COATED ORAL at 07:25

## 2025-05-18 NOTE — CARE COORDINATION
Care coordination :85 yr old male  admitted for elective EVAR.  Discharge today.  No needs Son to transport.    Case Management Assessment  Initial Evaluation    Date/Time of Evaluation: 5/18/2025 11:54 AM  Assessment Completed by: KRYSTEN Bray    If patient is discharged prior to next notation, then this note serves as note for discharge by case management.    Patient Name: Trace Nassar                   YOB: 1940  Diagnosis: Infrarenal abdominal aortic aneurysm (AAA) without rupture [I71.43]  AAA (abdominal aortic aneurysm) without rupture [I71.40]                   Date / Time: 5/16/2025  6:03 AM    Patient Admission Status: Inpatient   Readmission Risk (Low < 19, Mod (19-27), High > 27): Readmission Risk Score: 15    Current PCP: Silvino Garcia, DO  PCP verified by CM? Yes    Chart Reviewed: Yes      History Provided by: Patient  Patient Orientation: Alert and Oriented    Patient Cognition: Alert    Hospitalization in the last 30 days (Readmission):  No    If yes, Readmission Assessment in  Navigator will be completed.    Advance Directives:      Code Status: Full Code   Patient's Primary Decision Maker is: Legal Next of Kin    Primary Decision Maker: Jared Nassar Alecia Jayda - [phone]    Primary Decision Maker: LIATALDO Jayda - [phone]    Discharge Planning:    Patient lives with: Alone Type of Home: House  Primary Care Giver:    Patient Support Systems include: Children   Current Financial resources:    Current community resources:    Current services prior to admission: None            Current DME:              Type of Home Care services:  None    ADLS  Prior functional level: Independent in ADLs/IADLs  Current functional level: Independent in ADLs/IADLs    PT AM-PAC:   /24  OT AM-PAC:   /24    Family can provide assistance at DC: Yes  Would you like Case Management to discuss the discharge plan with any other family members/significant others, and if so, who? Yes

## 2025-05-18 NOTE — PLAN OF CARE
Problem: Chronic Conditions and Co-morbidities  Goal: Patient's chronic conditions and co-morbidity symptoms are monitored and maintained or improved  5/17/2025 2312 by Susanna Mullen RN  Outcome: Progressing  5/17/2025 1440 by Ale Weir RN  Outcome: Progressing  Flowsheets (Taken 5/17/2025 0800 by Silva Cruz RN)  Care Plan - Patient's Chronic Conditions and Co-Morbidity Symptoms are Monitored and Maintained or Improved:   Monitor and assess patient's chronic conditions and comorbid symptoms for stability, deterioration, or improvement   Collaborate with multidisciplinary team to address chronic and comorbid conditions and prevent exacerbation or deterioration   Update acute care plan with appropriate goals if chronic or comorbid symptoms are exacerbated and prevent overall improvement and discharge     Problem: Discharge Planning  Goal: Discharge to home or other facility with appropriate resources  5/17/2025 2312 by Susanna Mullen RN  Outcome: Progressing  5/17/2025 1440 by Ale Weir RN  Outcome: Progressing  Flowsheets (Taken 5/17/2025 0800 by Silva Cruz, RN)  Discharge to home or other facility with appropriate resources:   Identify barriers to discharge with patient and caregiver   Arrange for needed discharge resources and transportation as appropriate   Identify discharge learning needs (meds, wound care, etc)   Arrange for interpreters to assist at discharge as needed   Refer to discharge planning if patient needs post-hospital services based on physician order or complex needs related to functional status, cognitive ability or social support system     Problem: Safety - Adult  Goal: Free from fall injury  5/17/2025 2312 by Susanna Mullen, RN  Outcome: Progressing  5/17/2025 1440 by Ale Weir RN  Outcome: Progressing     Problem: Pain  Goal: Verbalizes/displays adequate comfort level or baseline comfort level  5/17/2025 2312 by Susanna Mullen, RN  Outcome:

## 2025-05-18 NOTE — PLAN OF CARE
Problem: Chronic Conditions and Co-morbidities  Goal: Patient's chronic conditions and co-morbidity symptoms are monitored and maintained or improved  5/18/2025 0002 by Silva Cruz RN  Outcome: Progressing  5/17/2025 2312 by Susanna Mullen RN  Outcome: Progressing  5/17/2025 1440 by Ale Weir RN  Outcome: Progressing  Flowsheets (Taken 5/17/2025 0800 by Silva Cruz RN)  Care Plan - Patient's Chronic Conditions and Co-Morbidity Symptoms are Monitored and Maintained or Improved:   Monitor and assess patient's chronic conditions and comorbid symptoms for stability, deterioration, or improvement   Collaborate with multidisciplinary team to address chronic and comorbid conditions and prevent exacerbation or deterioration   Update acute care plan with appropriate goals if chronic or comorbid symptoms are exacerbated and prevent overall improvement and discharge     Problem: Discharge Planning  Goal: Discharge to home or other facility with appropriate resources  5/18/2025 0002 by Silva Cruz RN  Outcome: Progressing  5/17/2025 2312 by Susanna Mullen RN  Outcome: Progressing  5/17/2025 1440 by Ale Weir RN  Outcome: Progressing  Flowsheets (Taken 5/17/2025 0800 by Silva Cruz RN)  Discharge to home or other facility with appropriate resources:   Identify barriers to discharge with patient and caregiver   Arrange for needed discharge resources and transportation as appropriate   Identify discharge learning needs (meds, wound care, etc)   Arrange for interpreters to assist at discharge as needed   Refer to discharge planning if patient needs post-hospital services based on physician order or complex needs related to functional status, cognitive ability or social support system     Problem: Safety - Adult  Goal: Free from fall injury  5/18/2025 0002 by Silva Cruz RN  Outcome: Progressing  5/17/2025 2312 by Susanna Mullen RN  Outcome: Progressing  5/17/2025 1440 by Destin

## 2025-05-18 NOTE — PLAN OF CARE
Problem: Chronic Conditions and Co-morbidities  Goal: Patient's chronic conditions and co-morbidity symptoms are monitored and maintained or improved  5/18/2025 0849 by Ale Weir RN  Outcome: Progressing     Problem: Discharge Planning  Goal: Discharge to home or other facility with appropriate resources  5/18/2025 0849 by Ale Weir RN  Outcome: Progressing     Problem: Safety - Adult  Goal: Free from fall injury  5/18/2025 0849 by Ale Weir RN  Outcome: Progressing     Problem: Pain  Goal: Verbalizes/displays adequate comfort level or baseline comfort level  5/18/2025 0849 by Ale Weir RN  Outcome: Progressing     Problem: Skin/Tissue Integrity  Goal: Skin integrity remains intact  Description: 1.  Monitor for areas of redness and/or skin breakdown2.  Assess vascular access sites hourly3.  Every 4-6 hours minimum:  Change oxygen saturation probe site4.  Every 4-6 hours:  If on nasal continuous positive airway pressure, respiratory therapy assess nares and determine need for appliance change or resting period  5/18/2025 0849 by Ale Weir RN  Outcome: Progressing     Problem: Respiratory - Adult  Goal: Achieves optimal ventilation and oxygenation  5/18/2025 0849 by Ale Weir RN  Outcome: Progressing     Problem: Gastrointestinal - Adult  Goal: Minimal or absence of nausea and vomiting  5/18/2025 0849 by Ale Weir RN  Outcome: Progressing

## 2025-05-18 NOTE — DISCHARGE SUMMARY
Physician Discharge Summary     Patient ID:  Trace Nassar  [id number]  85 y.o.  1940    Admit date: 5/16/2025    Discharge date: 05/18/25 7:09 AM    Admitting Physician: Farrukh Marmolejo MD     Admission Diagnoses: Infrarenal abdominal aortic aneurysm (AAA) without rupture [I71.43]  AAA (abdominal aortic aneurysm) without rupture [I71.40]    Discharge Diagnoses: Principal Problem:    Infrarenal abdominal aortic aneurysm (AAA) without rupture  Active Problems:    AAA (abdominal aortic aneurysm) without rupture  Resolved Problems:    * No resolved hospital problems. *      Admission Condition: good    Discharged Condition: stable    Indication for Admission: EVAR    Hospital Course/Procedures/Operation/treatments:   Patient presented on 5/16 for elective EVAR.  No intraoperative complications were encountered, the patient progressed well postoperatively and was deemed appropriate for discharge on postop day 2 with adequate pain control and tolerating regular diet.      Consults:   None    Significant Diagnostic Studies:   No results found.    Discharge Exam:  Vitals:    05/18/25 0337   BP: (!) 163/75   Pulse: 74   Resp: 22   Temp: 98.2 °F (36.8 °C)   SpO2: 93%     Gen: awake, alert and oriented x3, no apparent distress  CVS: RRR  Resp: No increased work of breathing on 3L  Abd: Soft, non-tender, non-distended  LLE: bilateral femoral sites C/D/I with strong femoral pulses bilaterally. Old ecchymosis on L thigh. Pt has signals on bilateral DP and PT. Motor and sensation intact     Disposition: home    In process/preliminary results:  Outstanding Order Results       No orders found from 4/17/2025 to 5/17/2025.            Patient Instructions:   Current Discharge Medication List             Details   aspirin EC 81 MG EC tablet Take 1 tablet by mouth daily  Qty: 30 tablet, Refills: 0      atorvastatin (LIPITOR) 40 MG tablet Take 1 tablet by mouth nightly  Qty: 30 tablet, Refills: 0      clopidogrel (PLAVIX) 75

## 2025-05-18 NOTE — ACP (ADVANCE CARE PLANNING)
Advance Care Planning   The patient has the following advanced directives on file:  Advance Directives       Power of  Living Will ACP-Advance Directive ACP-Power of     Not on File Filed on 07/28/14 Filed Not on File            The patient has appointed the following active healthcare agents:    Primary Decision Maker: MadayJared Montelongo - [phone]    Primary Decision Maker: ALDO JOSUE - [phone]    The Patient has the following current code status:    Code Status: Full Code      Visit Documentation:  Verified        KRYSTEN Bray  5/18/2025

## 2025-05-18 NOTE — PATIENT CARE CONFERENCE
P Quality Flow/Interdisciplinary Rounds Progress Note        Quality Flow Rounds held on May 18, 2025    Disciplines Attending:  Bedside Nurse and Nursing Unit Leadership    Trace ZENG Maday was admitted on 5/16/2025  6:03 AM    Anticipated Discharge Date:       Disposition:    Vidal Score:  Vidal Scale Score: 19    BS RISK OF UNPLANNED READMISSION 2.0             15 Total Score        Discussed patient goal for the day, patient clinical progression, and barriers to discharge.  The following Goal(s) of the Day/Commitment(s) have been identified:  Labs - Report Results and have pain managed      Kaylynn Galloway RN  May 18, 2025

## 2025-05-20 ENCOUNTER — TELEPHONE (OUTPATIENT)
Dept: VASCULAR SURGERY | Age: 85
End: 2025-05-20

## 2025-05-20 NOTE — TELEPHONE ENCOUNTER
Pt's daughter-in-law called stating pt is weak since recent hospitalization and asking if you could order home PT.

## 2025-05-22 DIAGNOSIS — Z86.79 STATUS POST ENDOVASCULAR ANEURYSM REPAIR (EVAR): Primary | ICD-10-CM

## 2025-05-22 DIAGNOSIS — Z86.73 HISTORY OF CVA (CEREBROVASCULAR ACCIDENT): Chronic | ICD-10-CM

## 2025-05-22 DIAGNOSIS — R53.1 WEAKNESS GENERALIZED: ICD-10-CM

## 2025-05-22 DIAGNOSIS — Z98.890 STATUS POST ENDOVASCULAR ANEURYSM REPAIR (EVAR): Primary | ICD-10-CM

## 2025-05-22 NOTE — PROGRESS NOTES
Physician Progress Note      PATIENT:               ALYSHA JOSUE  CSN #:                  [id number]  :                       1940  ADMIT DATE:       2025 6:03 AM  DISCH DATE:        2025 2:38 PM  RESPONDING  PROVIDER #:        Farrukh Marmolejo MD          QUERY TEXT:    Based on your medical judgment, please clarify these findings and document if   any of the following are being evaluated and/or treated:    The clinical indicators include:  -85 year old male with hx of afib, CHF, htn, MI, and stroke (H/P  Dr. Marmolejo)  -Eliquis (MAR 5/16)  Options provided:  -- Secondary hypercoagulable state in a patient with atrial fibrillation  -- Other - I will add my own diagnosis  -- Disagree - Not applicable / Not valid  -- Disagree - Clinically unable to determine / Unknown  -- Refer to Clinical Documentation Reviewer    PROVIDER RESPONSE TEXT:    This patient has secondary hypercoagulable state in a patient with atrial   fibrillation.    Query created by: Lalit Britt on 2025 9:06 AM      Electronically signed by:  Farrukh Marmolejo MD 2025 2:53 PM          
4 Eyes Skin Assessment     NAME:  Trace Nassar  YOB: 1940  MEDICAL RECORD NUMBER:  [id number]    The patient is being assessed for  Admission    I agree that at least one RN has performed a thorough Head to Toe Skin Assessment on the patient. ALL assessment sites listed below have been assessed.      Areas assessed by both nurses:    Head, Face, Ears, Shoulders, Back, Chest, Arms, Elbows, Hands, Sacrum. Buttock, Coccyx, Ischium, Legs. Feet and Heels, and Under Medical Devices         Does the Patient have a Wound? No noted wound(s)       Vidal Prevention initiated by RN: No  Wound Care Orders initiated by RN: No    Pressure Injury (Stage 3,4, Unstageable, DTI, NWPT, and Complex wounds) if present, place Wound referral order by RN under : No    New Ostomies, if present place, Ostomy referral order under : No     Nurse 1 eSignature: Electronically signed by Ale Weir RN on 5/17/25 at 2:39 PM EDT    **SHARE this note so that the co-signing nurse can place an eSignature**    Nurse 2 eSignature: Electronically signed by Cynthia Oglesby RN on 5/17/25 at 2:54 PM EDT  
Assuming care of pt.  
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  No results for input(s): \"NA\", \"K\", \"CL\", \"CO2\", \"BUN\", \"CREATININE\", \"GLUCOSE\", \"CALCIUM\" in the last 72 hours.    General Appearance: Arrived to PACU in stable condition, hypertensive   Eyes: PERRL  Pulmonary: Diminished bibasilar.  No wheezes, no accessory muscle use noted on simple face mask  Cardiovascular: IRR, no heaves or thrills palpated   Telemetry: Atrial fibrillation   Abdomen: Soft, nontender  Extremities: RLE +DP signals, LLE +popliteal signals, feet warm bilaterally  Neurologic/Psych: Awake, alert, following commands  Skin: Warm and dry    Incision: Bilateral groin puncture sites soft, left groin with ecchymosis prior to surgery      Assessment/Plan: Day of Surgery     1. AAA S/p EVAR   - Frequent neurovascular checks, monitor incision for signs of swelling/hematoma   - NPO, IVF   - Hills catheter to GD  - Bedrest  - prn pain control   - On Nicardipine for HTN, Target SBP<170        Electronically signed by KOSTA Epps CNP on 5/16/2025 at 10:27 AM   
Patient arrived to pcc with dentures upper/lower, phone, denture glue    Ale Weir RN    
Patient discharged wit all belongings. Iv removed, heart monitor returned to nurses station.     Ael Weir RN    
RN called patient son charly. Patient son can arrive at 1PM for discharge    Ale Weir RN    
RN notified Yared with [facility] of patient bladder scan.  RN is to recheck in 6-8 hours.     Ale Weir RN    
Report called to PCCU RN.   
Vascular Surgery Progress Note    Pt is being seen in f/u today regarding EVAR 5/16    Subjective  Pt s/e. He states he is doing okay this morning. Was up last night from outside noise.     Current Medications:    sodium chloride      niCARdipine Stopped (05/16/25 1630)      sodium chloride flush, sodium chloride, oxyCODONE, hydrALAZINE, labetalol    apixaban  5 mg Oral BID    aspirin EC  81 mg Oral Daily    atorvastatin  40 mg Oral Nightly    clopidogrel  75 mg Oral Daily    pantoprazole  40 mg Oral QAM AC    sodium chloride flush  5-40 mL IntraVENous 2 times per day        PHYSICAL EXAM:    BP (!) 151/58   Pulse 64   Temp 98 °F (36.7 °C) (Temporal)   Resp 22   Ht 1.6 m (5' 3\")   Wt 62.6 kg (138 lb)   SpO2 100%   BMI 24.45 kg/m²     Intake/Output Summary (Last 24 hours) at 5/17/2025 0717  Last data filed at 5/17/2025 0702  Gross per 24 hour   Intake 1472.36 ml   Output 2215 ml   Net -742.64 ml          Gen: awake, alert and oriented x3, no apparent distress  CVS: RRR  Resp: No increased work of breathing on 3L  Abd: Soft, non-tender, non-distended  LLE: bilateral femoral sites C/D/I with strong femoral pulses bilaterally. Old ecchymosis on L thigh. Pt has signals on bilateral DP and PT. Motor and sensation intact  : willoughby in place with clear urine.    LABS:    Lab Results   Component Value Date    WBC 6.3 05/13/2025    HGB 9.3 (L) 05/13/2025    HCT 29.5 (L) 05/13/2025     05/13/2025    PROTIME 21.7 (H) 05/13/2025    INR 2.0 05/13/2025    APTT 44.3 (H) 05/13/2025    K 4.4 05/13/2025    BUN 18 05/13/2025    CREATININE 1.2 05/13/2025       A/P  85 y.o. male s/p EVAR 5/16     - okay for regular diet   - okay to remove willoughby   - remove arterial line    - okay to be up to bed   - wean O2 as able   - manage pain as needed   - hopeful for discharge later today pending attending evaluation   - discussed with Dr. Francie Pollard, DO    Agree.  Patient doing well.  Will transfer to floor and plan 
complete list of your medications, please write the last time you took the medicine, give this list to the nurse in Pre-Op.    [x] Take ONLY the following medications the morning of surgery: protonix    [x] Stop all herbal supplements and vitamins 5 days before surgery. Stop NSAIDS 7 days before surgery.    [x] Follow physician instructions regarding any blood thinners you may be taking.     Last dose of Eliquis 5/13     Last dose of Plavix 5/15     Last dose of Aspirin 5/15    WHAT TO EXPECT:    [x] The day of surgery you will be greeted and checked in by the [facility] . In addition, you will be [facility] by a Patient Access Representative. Please bring your photo ID and insurance card. A nurse will greet you in accordance to the time you are needed in the pre-op area to prepare you for surgery. Please do not be discouraged if you are not greeted in the order you arrive as there are many variables that are involved in patient preparation. Your patience is greatly appreciated as you wait for your nurse.   [x] Delays may occur with surgery and staff will make a sincere effort to keep you informed of delays. If any delays occur with your procedure, we apologize ahead of time for your inconvenience as we recognize the value of your time.

## 2025-06-03 ENCOUNTER — TELEPHONE (OUTPATIENT)
Dept: VASCULAR SURGERY | Age: 85
End: 2025-06-03

## 2025-06-03 ENCOUNTER — OFFICE VISIT (OUTPATIENT)
Dept: VASCULAR SURGERY | Age: 85
End: 2025-06-03

## 2025-06-03 DIAGNOSIS — I71.43 INFRARENAL ABDOMINAL AORTIC ANEURYSM (AAA) WITHOUT RUPTURE: Primary | ICD-10-CM

## 2025-06-03 PROCEDURE — 99024 POSTOP FOLLOW-UP VISIT: CPT | Performed by: SURGERY

## 2025-06-03 NOTE — TELEPHONE ENCOUNTER
I called Jared to inform him of his fathers appointment. It is at [facility] on July 3,2025. He is to arrive at 1:00 pm. to register for a 1:30 pm. [employer]. He is to bring his insurance card, photo Id., and his list of medications.

## 2025-06-03 NOTE — PROGRESS NOTES
6/3/2025    Trace KARRI Nassar  1940    Chief Complaint   Patient presents with    Post-Op Check     AAA.        Patient returns for post operative evaluation status post endovascular repair of abdominal aortic aneurysm.  Since being discharged from the hospital pt has been very weak.  He has been working with PT at home.  He remains weak and can't walk very far.  When he does start walking he has a squeezing sensation in the left leg.  Shortness of breath and weakness is what limits his walking.  Pt will need a TAVR in the near future.         Procedure Laterality Date    ABDOMEN SURGERY      PERFORATED BOWEL    ABDOMINAL AORTIC ANEURYSM REPAIR, ENDOVASCULAR N/A 5/16/2025    ENDOVASCULAR REPAIR ABDOMINAL AORTIC ANEURYSM performed by Farrukh Marmolejo MD at [facility] [place]    APPENDECTOMY      CARDIAC PROCEDURE N/A 4/28/2025    Left heart cath / coronary angiography performed by Raulito Cadena MD at [facility] [place] CARDIAC CATH LAB    CARDIAC PROCEDURE N/A 4/30/2025    Percutaneous coronary intervention performed by Raulito Cadena MD at [facility] [place] CARDIAC CATH LAB    CARDIAC PROCEDURE N/A 4/30/2025    Insert stent awilda coronary performed by Raulito Cadena MD at [facility] [place] CARDIAC CATH LAB    CARDIOVERSION  11-    Dr. Layne-successful 200 joules    CAROTID ENDARTERECTOMY Left 04/16/2015    Vasoguard patch, Francie    COLONOSCOPY      CORONARY ANGIOPLASTY WITH STENT PLACEMENT  7-    3.0 x 26mm Integrity to Prox circ   Dr. Galvez    CORONARY ANGIOPLASTY WITH STENT PLACEMENT  July 2014    EYE SURGERY      CATARACT BILATERALLY    HERNIA REPAIR         Physical Exam:  The incisions are healing without evidence of infection.  Heart rhythm is regular.           Right      Left   Brachial     Radial     Femoral     Popliteal     Dorsalis Pedis     Posterior Tibial     (3=normal, 2=diminished, 1=barely palpable, 4=widened)    Assessment:  Post-operative endovascular repair abdominal aortic aneurysm.    Problem List Items Addressed This

## 2025-06-04 ENCOUNTER — TELEPHONE (OUTPATIENT)
Dept: CARDIOLOGY CLINIC | Age: 85
End: 2025-06-04

## 2025-06-04 NOTE — TELEPHONE ENCOUNTER
Pt went into the hospital for fainting in April. While he was in they found 3 blocked arteries. They did a heart cath/stent. From there they said he needed a valve replacement. They weren't comfortable doing the replacement until the aortic aneurysm was taken care of. Patient had that procedure was on 5/16/25. They wan to know who they need to contact to get that process started. They called for dr rodriguez guidance.       P:[phone]     gayatri

## 2025-06-06 ENCOUNTER — PREP FOR PROCEDURE (OUTPATIENT)
Dept: CARDIOLOGY | Age: 85
End: 2025-06-06

## 2025-06-06 DIAGNOSIS — Z95.2 HISTORY OF TRANSCATHETER AORTIC VALVE REPLACEMENT (TAVR): Primary | ICD-10-CM

## 2025-06-06 DIAGNOSIS — I35.0 NONRHEUMATIC AORTIC VALVE STENOSIS: ICD-10-CM

## 2025-06-06 DIAGNOSIS — I35.0 NODULAR CALCIFIC AORTIC VALVE STENOSIS: ICD-10-CM

## 2025-06-06 RX ORDER — SODIUM CHLORIDE 9 MG/ML
INJECTION, SOLUTION INTRAVENOUS CONTINUOUS
Status: CANCELLED | OUTPATIENT
Start: 2025-06-06

## 2025-06-06 RX ORDER — SODIUM CHLORIDE 9 MG/ML
INJECTION, SOLUTION INTRAVENOUS PRN
Status: CANCELLED | OUTPATIENT
Start: 2025-06-06

## 2025-06-06 RX ORDER — SODIUM CHLORIDE 0.9 % (FLUSH) 0.9 %
5-40 SYRINGE (ML) INJECTION PRN
Status: CANCELLED | OUTPATIENT
Start: 2025-06-06

## 2025-06-06 RX ORDER — MUPIROCIN 2 %
OINTMENT (GRAM) TOPICAL ONCE
Status: CANCELLED | OUTPATIENT
Start: 2025-06-06 | End: 2025-06-06

## 2025-06-06 RX ORDER — SODIUM CHLORIDE 0.9 % (FLUSH) 0.9 %
5-40 SYRINGE (ML) INJECTION EVERY 12 HOURS SCHEDULED
Status: CANCELLED | OUTPATIENT
Start: 2025-06-06

## 2025-06-09 ENCOUNTER — HOSPITAL ENCOUNTER (INPATIENT)
Age: 85
LOS: 5 days | Discharge: HOME HEALTH | DRG: 291 | End: 2025-06-14
Attending: STUDENT IN AN ORGANIZED HEALTH CARE EDUCATION/TRAINING PROGRAM | Admitting: INTERNAL MEDICINE
Payer: MEDICARE

## 2025-06-09 ENCOUNTER — APPOINTMENT (OUTPATIENT)
Dept: CT IMAGING | Age: 85
DRG: 291 | End: 2025-06-09
Attending: STUDENT IN AN ORGANIZED HEALTH CARE EDUCATION/TRAINING PROGRAM
Payer: MEDICARE

## 2025-06-09 ENCOUNTER — APPOINTMENT (OUTPATIENT)
Dept: GENERAL RADIOLOGY | Age: 85
DRG: 291 | End: 2025-06-09
Payer: MEDICARE

## 2025-06-09 ENCOUNTER — TELEPHONE (OUTPATIENT)
Dept: VASCULAR SURGERY | Age: 85
End: 2025-06-09

## 2025-06-09 ENCOUNTER — APPOINTMENT (OUTPATIENT)
Dept: ULTRASOUND IMAGING | Age: 85
DRG: 291 | End: 2025-06-09
Payer: MEDICARE

## 2025-06-09 DIAGNOSIS — I50.43 ACUTE ON CHRONIC COMBINED SYSTOLIC AND DIASTOLIC CHF (CONGESTIVE HEART FAILURE) (HCC): ICD-10-CM

## 2025-06-09 DIAGNOSIS — R79.1 SUPRATHERAPEUTIC INR: ICD-10-CM

## 2025-06-09 DIAGNOSIS — J18.9 PNEUMONIA DUE TO INFECTIOUS ORGANISM, UNSPECIFIED LATERALITY, UNSPECIFIED PART OF LUNG: ICD-10-CM

## 2025-06-09 DIAGNOSIS — I50.9 ACUTE ON CHRONIC CONGESTIVE HEART FAILURE, UNSPECIFIED HEART FAILURE TYPE (HCC): ICD-10-CM

## 2025-06-09 DIAGNOSIS — R09.89 DECREASED DORSALIS PEDIS PULSE: ICD-10-CM

## 2025-06-09 DIAGNOSIS — I70.209 FEMORAL-POPLITEAL ATHEROSCLEROSIS: ICD-10-CM

## 2025-06-09 DIAGNOSIS — R60.0 BILATERAL LOWER EXTREMITY EDEMA: ICD-10-CM

## 2025-06-09 DIAGNOSIS — J96.01 ACUTE RESPIRATORY FAILURE WITH HYPOXIA (HCC): Primary | ICD-10-CM

## 2025-06-09 LAB
ALBUMIN SERPL-MCNC: 2.8 G/DL (ref 3.5–5.2)
ALP SERPL-CCNC: 123 U/L (ref 40–129)
ALT SERPL-CCNC: 26 U/L (ref 0–50)
ANION GAP SERPL CALCULATED.3IONS-SCNC: 12 MMOL/L (ref 7–16)
AST SERPL-CCNC: 34 U/L (ref 0–50)
B PARAP IS1001 DNA NPH QL NAA+NON-PROBE: NOT DETECTED
B PERT DNA SPEC QL NAA+PROBE: NOT DETECTED
BASOPHILS # BLD: 0.07 K/UL (ref 0–0.2)
BASOPHILS NFR BLD: 1 % (ref 0–2)
BILIRUB SERPL-MCNC: 2 MG/DL (ref 0–1.2)
BNP SERPL-MCNC: ABNORMAL PG/ML (ref 0–450)
BUN SERPL-MCNC: 37 MG/DL (ref 8–23)
C PNEUM DNA NPH QL NAA+NON-PROBE: NOT DETECTED
CALCIUM SERPL-MCNC: 9 MG/DL (ref 8.8–10.2)
CHLORIDE SERPL-SCNC: 109 MMOL/L (ref 98–107)
CO2 SERPL-SCNC: 19 MMOL/L (ref 22–29)
CREAT SERPL-MCNC: 1.2 MG/DL (ref 0.7–1.2)
EOSINOPHIL # BLD: 0.11 K/UL (ref 0.05–0.5)
EOSINOPHILS RELATIVE PERCENT: 2 % (ref 0–6)
ERYTHROCYTE [DISTWIDTH] IN BLOOD BY AUTOMATED COUNT: 18.5 % (ref 11.5–15)
FLUAV RNA NPH QL NAA+NON-PROBE: NOT DETECTED
FLUBV RNA NPH QL NAA+NON-PROBE: NOT DETECTED
GFR, ESTIMATED: 57 ML/MIN/1.73M2
GLUCOSE SERPL-MCNC: 114 MG/DL (ref 74–99)
HADV DNA NPH QL NAA+NON-PROBE: NOT DETECTED
HCOV 229E RNA NPH QL NAA+NON-PROBE: NOT DETECTED
HCOV HKU1 RNA NPH QL NAA+NON-PROBE: NOT DETECTED
HCOV NL63 RNA NPH QL NAA+NON-PROBE: NOT DETECTED
HCOV OC43 RNA NPH QL NAA+NON-PROBE: NOT DETECTED
HCT VFR BLD AUTO: 29.3 % (ref 37–54)
HGB BLD-MCNC: 8.7 G/DL (ref 12.5–16.5)
HMPV RNA NPH QL NAA+NON-PROBE: NOT DETECTED
HPIV1 RNA NPH QL NAA+NON-PROBE: NOT DETECTED
HPIV2 RNA NPH QL NAA+NON-PROBE: NOT DETECTED
HPIV3 RNA NPH QL NAA+NON-PROBE: NOT DETECTED
HPIV4 RNA NPH QL NAA+NON-PROBE: NOT DETECTED
IMM GRANULOCYTES # BLD AUTO: 0.03 K/UL (ref 0–0.58)
IMM GRANULOCYTES NFR BLD: 1 % (ref 0–5)
INR PPP: 8.1
LYMPHOCYTES NFR BLD: 0.74 K/UL (ref 1.5–4)
LYMPHOCYTES RELATIVE PERCENT: 11 % (ref 20–42)
M PNEUMO DNA NPH QL NAA+NON-PROBE: NOT DETECTED
MAGNESIUM SERPL-MCNC: 1.9 MG/DL (ref 1.6–2.4)
MCH RBC QN AUTO: 27.6 PG (ref 26–35)
MCHC RBC AUTO-ENTMCNC: 29.7 G/DL (ref 32–34.5)
MCV RBC AUTO: 93 FL (ref 80–99.9)
MONOCYTES NFR BLD: 0.67 K/UL (ref 0.1–0.95)
MONOCYTES NFR BLD: 10 % (ref 2–12)
NEUTROPHILS NFR BLD: 75 % (ref 43–80)
NEUTS SEG NFR BLD: 4.9 K/UL (ref 1.8–7.3)
PLATELET # BLD AUTO: 312 K/UL (ref 130–450)
PMV BLD AUTO: 11.4 FL (ref 7–12)
POTASSIUM SERPL-SCNC: 4.6 MMOL/L (ref 3.5–5.1)
PROCALCITONIN SERPL-MCNC: 0.15 NG/ML (ref 0–0.08)
PROT SERPL-MCNC: 6.7 G/DL (ref 6.4–8.3)
PROTHROMBIN TIME: 90.6 SEC (ref 9.3–12.4)
RBC # BLD AUTO: 3.15 M/UL (ref 3.8–5.8)
RSV RNA NPH QL NAA+NON-PROBE: NOT DETECTED
RV+EV RNA NPH QL NAA+NON-PROBE: NOT DETECTED
SARS-COV-2 RNA NPH QL NAA+NON-PROBE: NOT DETECTED
SODIUM SERPL-SCNC: 140 MMOL/L (ref 136–145)
SPECIMEN DESCRIPTION: NORMAL
TROPONIN I SERPL HS-MCNC: 136 NG/L (ref 0–22)
TROPONIN I SERPL HS-MCNC: 136 NG/L (ref 0–22)
TROPONIN I SERPL HS-MCNC: 140 NG/L (ref 0–22)
WBC OTHER # BLD: 6.5 K/UL (ref 4.5–11.5)

## 2025-06-09 PROCEDURE — 83735 ASSAY OF MAGNESIUM: CPT

## 2025-06-09 PROCEDURE — 0202U NFCT DS 22 TRGT SARS-COV-2: CPT

## 2025-06-09 PROCEDURE — 84484 ASSAY OF TROPONIN QUANT: CPT

## 2025-06-09 PROCEDURE — 96365 THER/PROPH/DIAG IV INF INIT: CPT

## 2025-06-09 PROCEDURE — 93005 ELECTROCARDIOGRAM TRACING: CPT | Performed by: STUDENT IN AN ORGANIZED HEALTH CARE EDUCATION/TRAINING PROGRAM

## 2025-06-09 PROCEDURE — 93970 EXTREMITY STUDY: CPT

## 2025-06-09 PROCEDURE — 83880 ASSAY OF NATRIURETIC PEPTIDE: CPT

## 2025-06-09 PROCEDURE — 80053 COMPREHEN METABOLIC PANEL: CPT

## 2025-06-09 PROCEDURE — 85025 COMPLETE CBC W/AUTO DIFF WBC: CPT

## 2025-06-09 PROCEDURE — 96375 TX/PRO/DX INJ NEW DRUG ADDON: CPT

## 2025-06-09 PROCEDURE — 6360000004 HC RX CONTRAST MEDICATION: Performed by: RADIOLOGY

## 2025-06-09 PROCEDURE — 6370000000 HC RX 637 (ALT 250 FOR IP): Performed by: INTERNAL MEDICINE

## 2025-06-09 PROCEDURE — 2140000000 HC CCU INTERMEDIATE R&B

## 2025-06-09 PROCEDURE — 2500000003 HC RX 250 WO HCPCS: Performed by: INTERNAL MEDICINE

## 2025-06-09 PROCEDURE — 6360000002 HC RX W HCPCS: Performed by: STUDENT IN AN ORGANIZED HEALTH CARE EDUCATION/TRAINING PROGRAM

## 2025-06-09 PROCEDURE — 84145 PROCALCITONIN (PCT): CPT

## 2025-06-09 PROCEDURE — 2580000003 HC RX 258: Performed by: STUDENT IN AN ORGANIZED HEALTH CARE EDUCATION/TRAINING PROGRAM

## 2025-06-09 PROCEDURE — 71046 X-RAY EXAM CHEST 2 VIEWS: CPT

## 2025-06-09 PROCEDURE — 71275 CT ANGIOGRAPHY CHEST: CPT

## 2025-06-09 PROCEDURE — 85610 PROTHROMBIN TIME: CPT

## 2025-06-09 PROCEDURE — 99285 EMERGENCY DEPT VISIT HI MDM: CPT

## 2025-06-09 RX ORDER — SODIUM CHLORIDE 0.9 % (FLUSH) 0.9 %
5-40 SYRINGE (ML) INJECTION PRN
Status: DISCONTINUED | OUTPATIENT
Start: 2025-06-09 | End: 2025-06-14 | Stop reason: HOSPADM

## 2025-06-09 RX ORDER — PANTOPRAZOLE SODIUM 40 MG/1
40 TABLET, DELAYED RELEASE ORAL
Status: DISCONTINUED | OUTPATIENT
Start: 2025-06-10 | End: 2025-06-14 | Stop reason: HOSPADM

## 2025-06-09 RX ORDER — MAGNESIUM SULFATE IN WATER 40 MG/ML
2000 INJECTION, SOLUTION INTRAVENOUS PRN
Status: DISCONTINUED | OUTPATIENT
Start: 2025-06-09 | End: 2025-06-14 | Stop reason: HOSPADM

## 2025-06-09 RX ORDER — CLOPIDOGREL BISULFATE 75 MG/1
75 TABLET ORAL DAILY
Status: DISCONTINUED | OUTPATIENT
Start: 2025-06-10 | End: 2025-06-14 | Stop reason: HOSPADM

## 2025-06-09 RX ORDER — POTASSIUM CHLORIDE 1500 MG/1
40 TABLET, EXTENDED RELEASE ORAL PRN
Status: DISCONTINUED | OUTPATIENT
Start: 2025-06-09 | End: 2025-06-14 | Stop reason: HOSPADM

## 2025-06-09 RX ORDER — ATORVASTATIN CALCIUM 40 MG/1
40 TABLET, FILM COATED ORAL NIGHTLY
Status: DISCONTINUED | OUTPATIENT
Start: 2025-06-10 | End: 2025-06-14 | Stop reason: HOSPADM

## 2025-06-09 RX ORDER — ASPIRIN 81 MG/1
81 TABLET ORAL DAILY
Status: DISCONTINUED | OUTPATIENT
Start: 2025-06-10 | End: 2025-06-10

## 2025-06-09 RX ORDER — ONDANSETRON 4 MG/1
4 TABLET, ORALLY DISINTEGRATING ORAL EVERY 8 HOURS PRN
Status: DISCONTINUED | OUTPATIENT
Start: 2025-06-09 | End: 2025-06-14 | Stop reason: HOSPADM

## 2025-06-09 RX ORDER — SODIUM CHLORIDE 9 MG/ML
INJECTION, SOLUTION INTRAVENOUS PRN
Status: DISCONTINUED | OUTPATIENT
Start: 2025-06-09 | End: 2025-06-14 | Stop reason: HOSPADM

## 2025-06-09 RX ORDER — IOPAMIDOL 755 MG/ML
75 INJECTION, SOLUTION INTRAVASCULAR
Status: COMPLETED | OUTPATIENT
Start: 2025-06-09 | End: 2025-06-09

## 2025-06-09 RX ORDER — ACETAMINOPHEN 650 MG/1
650 SUPPOSITORY RECTAL EVERY 6 HOURS PRN
Status: DISCONTINUED | OUTPATIENT
Start: 2025-06-09 | End: 2025-06-14 | Stop reason: HOSPADM

## 2025-06-09 RX ORDER — ACETAMINOPHEN 325 MG/1
650 TABLET ORAL EVERY 6 HOURS PRN
Status: DISCONTINUED | OUTPATIENT
Start: 2025-06-09 | End: 2025-06-14 | Stop reason: HOSPADM

## 2025-06-09 RX ORDER — POTASSIUM CHLORIDE 7.45 MG/ML
10 INJECTION INTRAVENOUS PRN
Status: DISCONTINUED | OUTPATIENT
Start: 2025-06-09 | End: 2025-06-14 | Stop reason: HOSPADM

## 2025-06-09 RX ORDER — SODIUM CHLORIDE 0.9 % (FLUSH) 0.9 %
5-40 SYRINGE (ML) INJECTION EVERY 12 HOURS SCHEDULED
Status: DISCONTINUED | OUTPATIENT
Start: 2025-06-09 | End: 2025-06-14 | Stop reason: HOSPADM

## 2025-06-09 RX ORDER — POLYETHYLENE GLYCOL 3350 17 G/17G
17 POWDER, FOR SOLUTION ORAL DAILY PRN
Status: DISCONTINUED | OUTPATIENT
Start: 2025-06-09 | End: 2025-06-14 | Stop reason: HOSPADM

## 2025-06-09 RX ORDER — ONDANSETRON 2 MG/ML
4 INJECTION INTRAMUSCULAR; INTRAVENOUS EVERY 6 HOURS PRN
Status: DISCONTINUED | OUTPATIENT
Start: 2025-06-09 | End: 2025-06-14 | Stop reason: HOSPADM

## 2025-06-09 RX ORDER — FUROSEMIDE 10 MG/ML
40 INJECTION INTRAMUSCULAR; INTRAVENOUS ONCE
Status: COMPLETED | OUTPATIENT
Start: 2025-06-09 | End: 2025-06-09

## 2025-06-09 RX ADMIN — FUROSEMIDE 40 MG: 10 INJECTION, SOLUTION INTRAMUSCULAR; INTRAVENOUS at 18:28

## 2025-06-09 RX ADMIN — SODIUM CHLORIDE 1250 MG: 0.9 INJECTION, SOLUTION INTRAVENOUS at 17:43

## 2025-06-09 RX ADMIN — IOPAMIDOL 75 ML: 755 INJECTION, SOLUTION INTRAVENOUS at 16:47

## 2025-06-09 RX ADMIN — SODIUM CHLORIDE, PRESERVATIVE FREE 10 ML: 5 INJECTION INTRAVENOUS at 22:37

## 2025-06-09 RX ADMIN — APIXABAN 5 MG: 5 TABLET, FILM COATED ORAL at 22:37

## 2025-06-09 RX ADMIN — CEFEPIME 2000 MG: 2 INJECTION, POWDER, FOR SOLUTION INTRAVENOUS at 17:03

## 2025-06-09 ASSESSMENT — PAIN - FUNCTIONAL ASSESSMENT
PAIN_FUNCTIONAL_ASSESSMENT: NONE - DENIES PAIN

## 2025-06-09 ASSESSMENT — LIFESTYLE VARIABLES
HOW OFTEN DO YOU HAVE A DRINK CONTAINING ALCOHOL: NEVER
HOW MANY STANDARD DRINKS CONTAINING ALCOHOL DO YOU HAVE ON A TYPICAL DAY: PATIENT DOES NOT DRINK

## 2025-06-09 NOTE — TELEPHONE ENCOUNTER
Oskar with [facility] called to report swelling of both pt's feet since yesterday with a pulse ox of 87.    Dr. Luther's office also notified.

## 2025-06-09 NOTE — ED PROVIDER NOTES
[facility] EMERGENCY DEPARTMENT  EMERGENCY DEPARTMENT ENCOUNTER        Pt Name: Trace Nassar  MRN: [id number]  Birthdate 1940  Date of evaluation: 6/9/2025  Provider: Floridalma Lindesy MD  PCP: Silvino Garcia DO  Note Started: 12:09 PM EDT 6/9/25    CHIEF COMPLAINT       Chief Complaint   Patient presents with    Leg Swelling     Bilateral lower leg swelling beginning yesterday.        HISTORY OF PRESENT ILLNESS: 1 or more Elements     Limitations to history : None  Social Determinants : None    Trace Nassar is a 85 y.o. 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    Patient states he has severe pain on the back of his left calf the day prior. It resolved within 15 min and did not recur. Denies any associated CP, SOB, abdominal pain, nausea or vomiting. States the leg swelling began about 4 days ago but became much worse yesterday. He does not use oxygen at home. States he only becomes short of breath with exertion. Of note, patient had an AAA repair on 05/16/26 without post-op complications on his follow-up with vascular surgery.  He is scheduled for a TAVR on 06/18 with Dr. Luther. Per chart review patient is on warfarin 5 mg 3 days a week and 2.5 mg 4 days a week.  Patient states he last took warfarin 10 days ago. Denies any fever, chills, n/v, headache, dizziness, vision changes, neck tenderness or stiffness, weakness, cp, palpitations, sob, cough, abd pain, dysuria, hematuria, diarrhea, constipation, bloody stools.    Nursing Notes were all reviewed and agreed with or any disagreements were addressed in the HPI.    ROS: Pertinent positives and negatives are stated within HPI, all other systems reviewed and are negative.      --------------------------------------------- PAST HISTORY  pneumonia.    Please see ED course for more details:    ED Course as of 06/09/25 1839   Mon Jun 09, 2025   1620 CXR shows a right upper lobe infiltrate.  Vancomycin and cefepime ordered given patient's hypoxia and these findings and recent hospital admission. [VG]   1741 Consult to Dr. Nava for admission.  [VG]   1803 Spoke with Dr. Nava, discussed case, patient is accepted for admission.  [VG]      ED Course User Index  [VG] Ale Mccullough,        Medications   vancomycin (VANCOCIN) 1,250 mg in sodium chloride 0.9 % 250 mL IVPB ([id number]) (has no administration in time range)   ceFEPIme (MAXIPIME) 2,000 mg in sodium chloride 0.9 % 100 mL IVPB ([id number]) (0 mg IntraVENous Stopped 6/9/25 1733)   iopamidol (ISOVUE-370) 76 % injection 75 mL (75 mLs IntraVENous Given 6/9/25 1647)       New Prescriptions    No medications on file         Counseling:  The emergency provider has spoken with the patient and discussed today’s results, in addition to providing specific details for the plan of care and counseling regarding the diagnosis and prognosis.  Questions are answered at this time and they are agreeable with the plan.       --------------------------------- IMPRESSION AND DISPOSITION ---------------------------------    IMPRESSION  No diagnosis found.    DISPOSITION  Disposition: Admit to telemetry.  Patient condition is good.    Floridalma Lindsey MD, Family Medicine PGY-1

## 2025-06-10 ENCOUNTER — APPOINTMENT (OUTPATIENT)
Dept: GENERAL RADIOLOGY | Age: 85
DRG: 291 | End: 2025-06-10
Payer: MEDICARE

## 2025-06-10 PROBLEM — I50.43 ACUTE ON CHRONIC COMBINED SYSTOLIC AND DIASTOLIC CHF (CONGESTIVE HEART FAILURE) (HCC): Status: ACTIVE | Noted: 2022-11-24

## 2025-06-10 LAB
ANION GAP SERPL CALCULATED.3IONS-SCNC: 13 MMOL/L (ref 7–16)
BUN SERPL-MCNC: 34 MG/DL (ref 8–23)
CALCIUM SERPL-MCNC: 8.6 MG/DL (ref 8.8–10.2)
CHLORIDE SERPL-SCNC: 106 MMOL/L (ref 98–107)
CO2 SERPL-SCNC: 19 MMOL/L (ref 22–29)
CREAT SERPL-MCNC: 1.2 MG/DL (ref 0.7–1.2)
EKG ATRIAL RATE: 108 BPM
EKG Q-T INTERVAL: 428 MS
EKG QRS DURATION: 148 MS
EKG QTC CALCULATION (BAZETT): 497 MS
EKG R AXIS: 10 DEGREES
EKG T AXIS: -54 DEGREES
EKG VENTRICULAR RATE: 81 BPM
ERYTHROCYTE [DISTWIDTH] IN BLOOD BY AUTOMATED COUNT: 18 % (ref 11.5–15)
GFR, ESTIMATED: 62 ML/MIN/1.73M2
GLUCOSE SERPL-MCNC: 102 MG/DL (ref 74–99)
HCT VFR BLD AUTO: 29.3 % (ref 37–54)
HGB BLD-MCNC: 8.8 G/DL (ref 12.5–16.5)
INR PPP: 6
L PNEUMO1 AG UR QL IA.RAPID: NEGATIVE
MCH RBC QN AUTO: 27.5 PG (ref 26–35)
MCHC RBC AUTO-ENTMCNC: 30 G/DL (ref 32–34.5)
MCV RBC AUTO: 91.6 FL (ref 80–99.9)
MICROORGANISM/AGENT SPEC: ABNORMAL
PLATELET # BLD AUTO: 272 K/UL (ref 130–450)
PMV BLD AUTO: 11.1 FL (ref 7–12)
POTASSIUM SERPL-SCNC: 3.9 MMOL/L (ref 3.5–5.1)
PROTHROMBIN TIME: 66.5 SEC (ref 9.3–12.4)
RBC # BLD AUTO: 3.2 M/UL (ref 3.8–5.8)
S PNEUM AG SPEC QL: NEGATIVE
SODIUM SERPL-SCNC: 138 MMOL/L (ref 136–145)
SPECIMEN DESCRIPTION: ABNORMAL
SPECIMEN SOURCE: NORMAL
WBC OTHER # BLD: 7 K/UL (ref 4.5–11.5)

## 2025-06-10 PROCEDURE — 36415 COLL VENOUS BLD VENIPUNCTURE: CPT

## 2025-06-10 PROCEDURE — 92526 ORAL FUNCTION THERAPY: CPT

## 2025-06-10 PROCEDURE — 87081 CULTURE SCREEN ONLY: CPT

## 2025-06-10 PROCEDURE — 85610 PROTHROMBIN TIME: CPT

## 2025-06-10 PROCEDURE — 6370000000 HC RX 637 (ALT 250 FOR IP): Performed by: INTERNAL MEDICINE

## 2025-06-10 PROCEDURE — 87205 SMEAR GRAM STAIN: CPT

## 2025-06-10 PROCEDURE — 92611 MOTION FLUOROSCOPY/SWALLOW: CPT

## 2025-06-10 PROCEDURE — 94669 MECHANICAL CHEST WALL OSCILL: CPT

## 2025-06-10 PROCEDURE — 74230 X-RAY XM SWLNG FUNCJ C+: CPT

## 2025-06-10 PROCEDURE — 87181 SC STD AGAR DILUTION PER AGT: CPT

## 2025-06-10 PROCEDURE — 94664 DEMO&/EVAL PT USE INHALER: CPT

## 2025-06-10 PROCEDURE — 87899 AGENT NOS ASSAY W/OPTIC: CPT

## 2025-06-10 PROCEDURE — 80048 BASIC METABOLIC PNL TOTAL CA: CPT

## 2025-06-10 PROCEDURE — 85027 COMPLETE CBC AUTOMATED: CPT

## 2025-06-10 PROCEDURE — 6360000002 HC RX W HCPCS

## 2025-06-10 PROCEDURE — APPSS60 APP SPLIT SHARED TIME 46-60 MINUTES

## 2025-06-10 PROCEDURE — 87449 NOS EACH ORGANISM AG IA: CPT

## 2025-06-10 PROCEDURE — 87070 CULTURE OTHR SPECIMN AEROBIC: CPT

## 2025-06-10 PROCEDURE — 99223 1ST HOSP IP/OBS HIGH 75: CPT | Performed by: INTERNAL MEDICINE

## 2025-06-10 PROCEDURE — 87077 CULTURE AEROBIC IDENTIFY: CPT

## 2025-06-10 PROCEDURE — 2700000000 HC OXYGEN THERAPY PER DAY

## 2025-06-10 PROCEDURE — 2500000003 HC RX 250 WO HCPCS: Performed by: INTERNAL MEDICINE

## 2025-06-10 PROCEDURE — 93010 ELECTROCARDIOGRAM REPORT: CPT | Performed by: INTERNAL MEDICINE

## 2025-06-10 PROCEDURE — BD11YZZ FLUOROSCOPY OF ESOPHAGUS USING OTHER CONTRAST: ICD-10-PCS | Performed by: INTERNAL MEDICINE

## 2025-06-10 PROCEDURE — 2140000000 HC CCU INTERMEDIATE R&B

## 2025-06-10 PROCEDURE — 94640 AIRWAY INHALATION TREATMENT: CPT

## 2025-06-10 PROCEDURE — 6370000000 HC RX 637 (ALT 250 FOR IP)

## 2025-06-10 RX ORDER — IPRATROPIUM BROMIDE AND ALBUTEROL SULFATE 2.5; .5 MG/3ML; MG/3ML
1 SOLUTION RESPIRATORY (INHALATION)
Status: DISCONTINUED | OUTPATIENT
Start: 2025-06-10 | End: 2025-06-14 | Stop reason: HOSPADM

## 2025-06-10 RX ORDER — ALBUTEROL SULFATE 0.83 MG/ML
2.5 SOLUTION RESPIRATORY (INHALATION) EVERY 6 HOURS PRN
Status: DISCONTINUED | OUTPATIENT
Start: 2025-06-10 | End: 2025-06-14 | Stop reason: HOSPADM

## 2025-06-10 RX ORDER — ARFORMOTEROL TARTRATE 15 UG/2ML
15 SOLUTION RESPIRATORY (INHALATION)
Status: DISCONTINUED | OUTPATIENT
Start: 2025-06-10 | End: 2025-06-14 | Stop reason: HOSPADM

## 2025-06-10 RX ORDER — FUROSEMIDE 10 MG/ML
20 INJECTION INTRAMUSCULAR; INTRAVENOUS 2 TIMES DAILY
Status: DISCONTINUED | OUTPATIENT
Start: 2025-06-10 | End: 2025-06-13

## 2025-06-10 RX ORDER — BUDESONIDE 0.5 MG/2ML
0.5 INHALANT ORAL
Status: DISCONTINUED | OUTPATIENT
Start: 2025-06-10 | End: 2025-06-14 | Stop reason: HOSPADM

## 2025-06-10 RX ADMIN — BUDESONIDE 500 MCG: 0.5 SUSPENSION RESPIRATORY (INHALATION) at 21:41

## 2025-06-10 RX ADMIN — IPRATROPIUM BROMIDE AND ALBUTEROL SULFATE 1 DOSE: 2.5; .5 SOLUTION RESPIRATORY (INHALATION) at 12:51

## 2025-06-10 RX ADMIN — BUDESONIDE 500 MCG: 0.5 SUSPENSION RESPIRATORY (INHALATION) at 09:43

## 2025-06-10 RX ADMIN — APIXABAN 5 MG: 5 TABLET, FILM COATED ORAL at 09:07

## 2025-06-10 RX ADMIN — FUROSEMIDE 20 MG: 10 INJECTION, SOLUTION INTRAMUSCULAR; INTRAVENOUS at 11:46

## 2025-06-10 RX ADMIN — CLOPIDOGREL BISULFATE 75 MG: 75 TABLET, FILM COATED ORAL at 09:07

## 2025-06-10 RX ADMIN — IPRATROPIUM BROMIDE AND ALBUTEROL SULFATE 1 DOSE: 2.5; .5 SOLUTION RESPIRATORY (INHALATION) at 09:43

## 2025-06-10 RX ADMIN — ARFORMOTEROL TARTRATE 15 MCG: 15 SOLUTION RESPIRATORY (INHALATION) at 09:43

## 2025-06-10 RX ADMIN — APIXABAN 5 MG: 5 TABLET, FILM COATED ORAL at 20:31

## 2025-06-10 RX ADMIN — ARFORMOTEROL TARTRATE 15 MCG: 15 SOLUTION RESPIRATORY (INHALATION) at 21:41

## 2025-06-10 RX ADMIN — PANTOPRAZOLE SODIUM 40 MG: 40 TABLET, DELAYED RELEASE ORAL at 05:15

## 2025-06-10 RX ADMIN — SODIUM CHLORIDE, PRESERVATIVE FREE 10 ML: 5 INJECTION INTRAVENOUS at 20:31

## 2025-06-10 RX ADMIN — IPRATROPIUM BROMIDE AND ALBUTEROL SULFATE 1 DOSE: 2.5; .5 SOLUTION RESPIRATORY (INHALATION) at 21:41

## 2025-06-10 RX ADMIN — SACUBITRIL AND VALSARTAN 1 TABLET: 24; 26 TABLET, FILM COATED ORAL at 11:46

## 2025-06-10 RX ADMIN — ATORVASTATIN CALCIUM 40 MG: 40 TABLET, FILM COATED ORAL at 20:31

## 2025-06-10 RX ADMIN — IPRATROPIUM BROMIDE AND ALBUTEROL SULFATE 1 DOSE: 2.5; .5 SOLUTION RESPIRATORY (INHALATION) at 16:53

## 2025-06-10 RX ADMIN — ASPIRIN 81 MG: 81 TABLET, COATED ORAL at 09:07

## 2025-06-10 RX ADMIN — SACUBITRIL AND VALSARTAN 1 TABLET: 24; 26 TABLET, FILM COATED ORAL at 20:31

## 2025-06-10 RX ADMIN — FUROSEMIDE 20 MG: 10 INJECTION, SOLUTION INTRAMUSCULAR; INTRAVENOUS at 16:34

## 2025-06-10 RX ADMIN — SODIUM CHLORIDE, PRESERVATIVE FREE 10 ML: 5 INJECTION INTRAVENOUS at 09:08

## 2025-06-10 NOTE — FLOWSHEET NOTE
06/09/25 2205   Belongings   Dental Appliances Uppers;Lowers   Vision - Corrective Lenses None   Hearing Aid None   Clothing Footwear;Pajamas;Jacket/Coat   Jewelry Necklace;Watch;At bedside   Electronic Devices Cell Phone;   Weapons (Notify Protective Services/Security) None   Other Valuables Wallet;Buell   Home Medications None   Valuables Given To Patient   Provide Name(s) of Who Valuable(s) Were Given To self

## 2025-06-10 NOTE — ACP (ADVANCE CARE PLANNING)
Advance Care Planning   Healthcare Decision Maker:    Primary Decision Maker: NeshabridgetteJared Alecia Montelongo - [phone]    Primary Decision Maker: ALDO JOSUE - Jayda - [phone]    Click here to complete Healthcare Decision Makers including selection of the Healthcare Decision Maker Relationship (ie \"Primary\").

## 2025-06-10 NOTE — H&P
[facility]              [address] [place] [address]                           HISTORY & PHYSICAL      PATIENT NAME: ALYSHA JOSUE        : 1940  MED REC NO: [id number]                        ROOM: 6503  ACCOUNT NO: [id number]                       ADMIT DATE: 2025  PROVIDER: Steven Nava DO      PRIMARY CARE PHYSICIAN:  Silvino Garcia DO    CHIEF COMPLAINT:  Leg swelling.    HISTORY OF PRESENT ILLNESS:  The patient is an 85-year-old  male who presented to the emergency room with complaints of bilateral leg swelling.  Also, he states he had severe left leg pain, lasted 15 minutes and resolved.  He is status post recent abdominal aortic aneurysm repair.  He has a history of aortic stenosis.  He was seen in the emergency room.  Procalcitonin 0.15.  ProBNP 15,437, BUN 37, creatinine 1.2, INR 8.1.  The patient is on chronic Eliquis therapy.  Respiratory panel negative.  CTA pulmonary:  No PE; severe cardiomegaly; fusiform ascending thoracic aortic ectasia measuring 3.9 cm; multifocal pneumonia in both lungs, greater on the right side.  Chest x-ray:  Right upper lobe infiltrate, cardiomegaly.  Ultrasound lower extremities, no DVT.  The patient was admitted for further evaluation and treatment.  The patient is scheduled for TAVR on 2025.    MEDICATIONS:  Prior to admission:  Eliquis, aspirin, Lipitor, Protonix.    CARDIOLOGIST:  Ashkan.    PAST MEDICAL HISTORY:  Atrial fibrillation, chronic anticoagulation, elevated cholesterol, severe aortic stenosis, LV systolic dysfunction, arthritis, coronary artery disease, COPD, chronic diastolic congestive heart failure.    PAST SURGICAL HISTORY:  Abdominal aortic aneurysm repair, coronary angioplasty with stent placement, cardioversion, hernia repair, abdominal surgery, eye surgery, appendectomy, left carotid endarterectomy.    SOCIAL HISTORY:  The patient quit tobacco.  Occasional  alcohol.    REVIEW OF SYSTEMS:  Remarkable for above-stated chief complaint.    ALLERGIES:  PENICILLIN AND SULFA.      PHYSICAL EXAMINATION:  GENERAL APPEARANCE:  Physical exam reveals an 85-year-old  male who is alert, cooperative, and a fair historian.  VITAL SIGNS:  On admission, temperature 97.5, pulse 76, respirations 18, blood pressure 159/85.  HEAD:  Normocephalic, atraumatic.   EYES:  Pupils equal and reactive to light.  Extraocular muscles intact.  Fundi not well visualized.  NOSE:  No obstruction, polyp, or discharge noted.  MOUTH:  Mucosa without lesion.  PHARYNX:  Noninjected without exudate.  NECK:  Supple.  No JVD.  No thyromegaly.  No carotid bruits.  HEART:  Irregularly irregular with grade 2/6 systolic murmur.  LUNGS:  Clear to auscultation bilaterally.  ABDOMEN:  Positive bowel sounds.  Soft, nontender.  No rebound.  No guarding.  No hepatosplenomegaly.  No masses.  BACK:  With increased thoracic kyphosis.  EXTREMITIES:  With bilateral lower extremity pitting edema.  LYMPH NODES:  No adenopathy noted.  SKIN:  Without rash or lesion.    IMPRESSION:  Acute hypoxic respiratory failure, abnormal CT chest with infiltrates, severe aortic stenosis, atrial fibrillation, coronary artery disease, chronic anticoagulation, chronic obstructive pulmonary disease, hyperlipidemia, hypertension, status post recent abdominal aortic aneurysm repair.    PLAN:  Admit.  Cardiac telemetry.  Cardiology and Pulmonary to see.  Cautious diuresis.  Serial lab.  Discharge plan, home when stable.          SUAD HAY DO      D:  06/10/2025 09:11:52     T:  06/10/2025 10:11:04     KODY/CHELSEA  Job #:  [id number]     Doc#:  [id number]

## 2025-06-10 NOTE — PROGRESS NOTES
[facility] Quality Flow/Interdisciplinary Rounds Progress Note        Quality Flow Rounds held on Maru 10, 2025    Disciplines Attending:  Bedside Nurse, , , and Nursing Unit Leadership    Trace B Patris was admitted on 6/9/2025 11:53 AM    Anticipated Discharge Date:       Disposition:    Vidal Score:  Vidal Scale Score: 19    [facility] RISK OF UNPLANNED READMISSION 2.0             20.9 Total Score        Discussed patient goal for the day, patient clinical progression, and barriers to discharge.  The following Goal(s) of the Day/Commitment(s) have been identified:  Diagnostics - Report Results and Labs - Report Results      Betsey Benton RN  Maru 10, 2025

## 2025-06-10 NOTE — PROGRESS NOTES
SPEECH/LANGUAGE PATHOLOGY  VIDEOFLUOROSCOPIC STUDY OF SWALLOWING (MBS)   and PLAN OF CARE    PATIENT NAME:  Trace Nassar  (male)     MRN:  [id number]    :  1940  (85 y.o.)  STATUS:  Inpatient: Room 6503/6503-A    TODAY'S DATE:  6/10/2025  ORDER DATE, DESCRIPTION AND REFERRING PROVIDER: Marifer Painter, APRN - CNP  FL MODIFIED BARIUM SWALLOW W VIDEO :  6/10/25  REASON FOR REFERRAL: aspiration   EVALUATING THERAPIST: Aaliyah Suero, SLP      RESULTS:      DYSPHAGIA DIAGNOSIS:  Clinical indicators of moderate oropharyngeal phase dysphagia     DIET RECOMMENDATIONS:  Minced and moist consistency solids (IDDSI level 5) with  nectar consistency (mildly thick - IDDSI level 2) liquids--SMALL CUP SIPS     MEDICATION ADMINISTRATION, and Administer medication crushed, as able, with pudding/applesauce    FEEDING RECOMMENDATIONS:    Assistance level:  Supervision is needed during all oral intake  Verbal cueing for implementation of safe swallow strategies      Compensatory strategies recommended: Fully alert for all PO, Thorough oral care to prevent colonization of oral bacteria, Upright in bed/ chair as tolerated  Double swallow to clear pharyngeal residue   Encourage oral clearing of bolus before next bite/sip is taken  Slow rate of intake   SINGLE cup sips  SMALL bites  Liquid wash after thicker items to assist with clearing pharyngeal residue      Discussed recommendations with:  Patient  and charge nurse via phone     Laryngeal Penetration and Aspiration:  Penetration WITHOUT aspiration was observed in today's study with  thin liquid, mildly thick liquid (nectar)    SPEECH THERAPY  PLAN OF CARE   The dysphagia POC is established based on physician order and dysphagia diagnosis    Skilled SLP intervention for dysphagia management on acute care up to 5 x per week until goals met, pt plateaus in function and/or discharged from hospital      Conditions Requiring Skilled Therapeutic Intervention for

## 2025-06-10 NOTE — CONSULTS
Inpatient Cardiology Consultation      Reason for Consult: Acute CHF    Consulting Physician: Dr. Looney     Requesting Physician: Dr. Nava    Date of Consultation: 6/10/2025    HISTORY OF PRESENT ILLNESS:   Trace Nassar  is a 85 y.o.  male known to [facility] cardiology Dr. Layne last seen in office 4/17/2025 in routine follow-up.  Most recently seen as inpatient consultation by Dr. Luther 5/5/2025 for possible TAVR, underwent EVAR to fix abdominal aortic aneurysm 5/16 (Dr. Marmolejo).  Scheduled for TAVR 6/18.  Seen in office by vascular surgery on 6/3/2025 where he was noted to have weakness, limited mobility secondary to SMITH and squeezing sensation in left leg.     Recent encounters:   4/22/2025: Presented to [facility] for syncope while shopping at [employer], he became very weak and when he reached out to grab something fell.  Unknown if LOC.  EMS reported atrial flutter, confirmed by EKG in ED. See. By Cardiology, Dr. Arora TTE revealed severe aortic stenosis. Also noted to have AF with slow ventricular response (40-50bpm) -- metoprolol discontinued as well as hypotension --  Lasix held.  [place] [facility] [place] evaluation by CTS/structural heart specialist and EP to discuss pacemaker implantation.  Plan for heart catheterization 4/25 that was postponed due to elevated INR  3.5-1.9 s/p vitamin K, placed on Lovenox for anticoagulation. 4/26: Seen by CT surgery (Dr. Jama) but determined due to advanced age/frailty and limited mobility he would be best served with TAVR > reportedly declined. Awaiting cardiac catheterization.4/27: Seen by EP (Dr. Ash) who agreed with discontinuation of of beta-blocker and consideration for permanent pacemaker in future. 4/28: Losartan on hold. Underwent LHC with Dr. Cadena, showed severe disease of left main, heavily calcified ostial LAD stenosis, mild in-stent stenosis of mid LAD coronary artery, Posterior takeoff of small nondominant RCA that is totally occluded  with left-to-right collaterals.  Noted not to be a candidate for CABG, plan for complicated PCI placement. 4/29: Back to Cath Lab s/p PCI x 2 to left main and ostial LAD. 4/30: Vascular surgery (Dr. Marmolejo) consulted for 6.9 cm abdominal aortic aneurysm noted on preop CTA, reportedly declines intervention. Pan for TAVR work up as OP.     5/5/2025: seen in consultation by Dr. Luther for possible TAVR which was deferred secondary to need for abdominal aortic aneurysm repair. With plans to completed TAVR once EVAR access site is healed. Noted to be bradycardiac during admission, discharge on ZIO monitor.     5/16/2025: Underwent abdominal aortic aneurysm repair (Dr. Marmolejo)       ED report: Presented 6/9/2025 via EMS for complaints of severe calf pain x 1 day prior to arrival which lasted approximately 15 minutes and did not recur.  Notes leg swelling x 4 days which worsened yesterday.  Noted to have last taken warfarin 10 days ago.     Arrival vitals: /85 HR 76 afebrile O2 sat 98% 2 LNC  Labs: Troponin 136>140> 136 NT proBNP 15,437 (4/22 14,970) K4.6> 3.9 Mg 1.9 BUN 37> 34 CR 1.2 albumin 2.8 total bilirubin 2.0 all other LFTs WNL WBC 6.5 Hgb 8.7> 8.8 (baseline Hgb 12.5-9.0)  PT/INR 66.5/6.0  Respiratory panel -negative  RAD:   6/9 2 VW CXR: Right upper lobe infiltrate. Follow-up to resolution is recommended.  Cardiomegaly  6/9 CTA pulmonary: No PE.  Severe cardiomegaly. Fusiform ascending thoracic aortic ectasia measuring 3.9 cm. Multifocal pneumonia in both lungs, greater on the right side, could be aspiration in etiology.   6/9 LUE US: Negative for DVT  ED treatment: Lasix 40 Mg IV x 1, vancomycin, cefepime  Echo -pending    Pulmonology following -acute respiratory failure with hypoxia      Patient seen and examined.  Recent vitals: /69 HR 69 afebrile O2 sat 94% 3 LNC    6/10/2025:  Assessed resting in bed, no family at bedside. Patient reports yesterday he woke up feeling fine, ate breakfast    I directly participated in the medical-decision making, ordering pertinent tests and medication adjustment.  I am the primary author for the consult notes.  I spent > 51% of the total time involved with the encounter and 100% of assessment and recommendations.  The total time included the following:  Independently interviewing the patient (HPI, ROS, PMH, PSH, FMH, SH, allergies, and medications)  Reviewing available records, results of previously ordered testing/procedures, and current problem list  Independently performing a medically appropriate examination  Reviewing the above documentation completed by the TON  Ordering medications, tests, and/or procedures as required  Formulating the assessment/plan and reviewing the rationale for the above recommendations  Counseling/educating the patient  Coordinating care with other healthcare professionals  Communicating results to the patient's family/caregiver as available  Documenting clinical information in the patient's electronic health record      I independently interviewed and examined the patient. I have reviewed the above documentation completed by the TON. Please see my additional contributions to the HPI, physical exam, and assessment / medical decision making.  85-year-old male with coronary artery disease status post PCI and severe aortic stenosis awaiting TAVR.  He recently underwent abdominal aortic aneurysm surgical intervention and subsequently TAVR was postponed until June 18, 2025.  He now presented with acute on chronic heart failure with reduced EF and cardiology consulted.        Review of Systems:  Cardiac: As per HPI  General: No fever, chills  Pulmonary: As per HPI  GI: No nausea, vomiting  Musculoskeletal: ALMONTE x 4, no focal motor deficits  Skin: Intact, no rashes  Neuro/Psych: No headache or seizures    Physical Exam:  /69   Pulse 72   Temp 97 °F (36.1 °C) (Temporal)   Resp 16   Ht 1.651 m (5' 5\")   Wt 63 kg (139 lb)   SpO2 95%

## 2025-06-10 NOTE — PLAN OF CARE
Problem: Chronic Conditions and Co-morbidities  Goal: Patient's chronic conditions and co-morbidity symptoms are monitored and maintained or improved  6/10/2025 1945 by Susanna Mullen, RN  Outcome: Progressing     Problem: Discharge Planning  Goal: Discharge to home or other facility with appropriate resources  6/10/2025 1945 by Susanna Mullen, RN  Outcome: Progressing     Problem: Safety - Adult  Goal: Free from fall injury  6/10/2025 1945 by Susanna Mullen, RN  Outcome: Progressing

## 2025-06-10 NOTE — PROGRESS NOTES
4 Eyes Skin Assessment     NAME:  Trace Nassar  YOB: 1940  MEDICAL RECORD NUMBER:  [id number]    The patient is being assessed for  Admission    I agree that at least one RN has performed a thorough Head to Toe Skin Assessment on the patient. ALL assessment sites listed below have been assessed.      Areas assessed by both nurses:    Head, Face, Ears, Shoulders, Back, Chest, Arms, Elbows, Hands, Sacrum. Buttock, Coccyx, Ischium, Legs. Feet and Heels, and Under Medical Devices         Does the Patient have a Wound? Yes wound(s) were present on assessment. LDA wound assessment was Initiated and completed by RN       Vidal Prevention initiated by RN: No  Wound Care Orders initiated by RN: Yes    Pressure Injury (Stage 3,4, Unstageable, DTI, NWPT, and Complex wounds) if present, place Wound referral order by RN under : No    New Ostomies, if present place, Ostomy referral order under : No     Nurse 1 eSignature: Electronically signed by Kyle Barrientos RN on 6/10/25 at 5:16 AM EDT    **SHARE this note so that the co-signing nurse can place an eSignature**    Nurse 2 eSignature: Electronically signed by Miranda Montilla RN on 6/10/25 at 5:18 AM EDT

## 2025-06-10 NOTE — PROGRESS NOTES
Spiritual Health History and Assessment/Progress Note  [facility]    Initial Encounter,  ,  ,      Name: Trace Nassar MRN: [id number]    Age: 85 y.o.     Sex: male   Language: English   Sikh: German Latter-day   Acute respiratory failure with hypoxia (HCC)     Date: 6/10/2025                           [facility] [place] [facility]        Referral/Consult From: Rounding   Encounter Overview/Reason: Initial Encounter  Service Provided For: Patient    Jennifer, Belief, Meaning:   Patient identifies as spiritual and is connected with a jennifer tradition or spiritual practice  Family/Friends No family/friends present      Importance and Influence:  Patient has spiritual/personal beliefs that influence decisions regarding their health  Family/Friends No family/friends present    Community:  Patient is connected with a spiritual community and feels well-supported. Support system includes: Children and Extended family  Family/Friends No family/friends present    Assessment and Plan of Care:     Patient Interventions include: Facilitated expression of thoughts and feelings, Explored spiritual coping/struggle/distress, Engaged in theological reflection, Affirmed coping skills/support systems, and Provided sacramental/Quaker ritual  Family/Friends Interventions include: No family/friends present    Patient Plan of Care: Spiritual Care available upon further referral  Family/Friends Plan of Care: No family/friends present    Electronically signed by TRUDY QUEEN on 6/10/2025 at 3:04 PM

## 2025-06-10 NOTE — CARE COORDINATION
6/10/2025 Transition of care note:  Pt admitted 6/9 w/respiratory failure.  Wearing o2. O2 will need weaned or will need pulse ox testing prior to discharge.  For modified barium swallow.  Met w/patient and explained CM role.  Pt states he lives at home alone in a 2 story home. No step to enter.  States is independent at home and has no issues w/his steps.  Does not use any assistive devices at home.  Does not have oxygen at home. Drives and does his own shopping.  States he has 2 sons that assist him when needed.  Pt declined the need for hhc or assistance at home.  States one of his sons will transport him home upon discharge.  Confirmed his pcp is Silvino Garcia DO.  Pt plans to return home upon discharge.  Will follow for transition of care needs.  Electronically signed by Josephine Norton RN CM on 6/10/2025 at 11:52 AM    Case Management Assessment  Initial Evaluation    Date/Time of Evaluation: 6/10/2025 11:53 AM  Assessment Completed by: Josephine Norton RN    If patient is discharged prior to next notation, then this note serves as note for discharge by case management.    Patient Name: Trace Nassar                   YOB: 1940  Diagnosis: Acute respiratory failure with hypoxia (HCC) [J96.01]                   Date / Time: 6/9/2025 11:53 AM    Patient Admission Status: Inpatient   Readmission Risk (Low < 19, Mod (19-27), High > 27): Readmission Risk Score: 20.5    Current PCP: Silvino Garcia, DO  PCP verified by CM? Yes    Chart Reviewed: Yes      History Provided by: Patient  Patient Orientation: Alert and Oriented    Patient Cognition: Alert    Hospitalization in the last 30 days (Readmission):  Yes    If yes, Readmission Assessment in CM Navigator will be completed.    Advance Directives:      Code Status: Full Code   Patient's Primary Decision Maker is: Patient Declined (Legal Next of Kin Remains as Decision Maker)    Primary Decision Maker: Jared Nassar - Jayda - [phone]    Primary  [place] [id number]  Phone: [phone] Fax: [phone]      Notes:    Factors facilitating achievement of predicted outcomes: Family support    Barriers to discharge: Medical complications    Additional Case Management Notes: see notes    The Plan for Transition of Care is related to the following treatment goals of Acute respiratory failure with hypoxia (HCC) [J96.01]    IF APPLICABLE: The Patient and/or patient representative Trace and his family were provided with a choice of provider and agrees with the discharge plan. Freedom of choice list with basic dialogue that supports the patient's individualized plan of care/goals and shares the quality data associated with the providers was provided to:     Patient Representative Name:       The Patient and/or Patient Representative Agree with the Discharge Plan?      Josephine Norton RN  Case Management Department  Ph:[phone]

## 2025-06-10 NOTE — CONSULTS
Joe Cordero M.D.,[facility]  Daniel Szymanski D.O., QUEENIE., [facility]  Mario Alberto Fleming M.D.  Emelia Munson M.D.   Carlos Hammonds D.O.  Steven Barillas M.D.       Patient:  Trace Nassar 85 y.o. male MRN: [id number]           PULMONARY CONSULTATION    Reason for Consultation: acute resp failure with hypoxia  Referring Physician: Steven Nava DO    Communication with the referring physician will be sent via the electronic medical record.    Chief Complaint: left leg pain and swelling    CODE STATUS: FULL CODE    SUBJECTIVE:  HPI:  Trace Nassar is a 85 y.o. male we were asked to evaluate for acute hypoxic respiratory failure.  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    He presented to the hospital with left leg pain and swelling and shortness of breath with exertion, baseline oxygen requirements room air. [place] elevated at [address], troponin 136, 140, respiratory panel negative. Ultrasound of the lower extremities was negative for DVT. Procalcitonin 0.15, WBC 6.5, INR 8.1. Pulminary CTA was negative for pulmonary embolism, did show emphysema and multifocal pneumonia. He was started on Cefepime and Vancomycin.    Patient seen and examined lying in bed just completed a breathing treatment. I checked his saturation on room air and SpO2 was 87%, he came  up to 93% on 3 liters. He is not in any distress. He tells me he does have trouble swallowing a lot and started coughing just this morning with water.      Past Medical History:   Diagnosis Date    A-fib (HCC)     A-fib (HCC)     presently on coumadin    Arthritis     CAD (coronary artery disease)     CHF (congestive heart failure) ([facility])     COPD with acute exacerbation ([facility]) 11/26/2022    Diastolic heart failure ([facility]) 07/23/2014 7/23/14- echocardiogram revealed an LVEF of 55-60%, left atrium    Net 126.41 ml                      Physical Exam:  General: The patient is lying in bed comfortably without any distress.  Breathing is not labored  HEENT: Pupils are equal round and reactive to light, there are no oral lesions and no post-nasal drip   Neck: supple without adenopathy  Cardiovascular: irregular, murmur  Respiratory: bilateral crackles, L>R  Abdomen: soft, non-tender, non-distended, normal bowel sounds  Extremities: lower extremity edema  Skin: no rash or lesion  Neurologic: CN II-XII grossly intact, no focal deficits    Pulmonary Function Testing - 4/30/25  The spirometry shows minimal reduction in the FVC and mild reduction in the FEV1.  The FEV1/FVC is normal.  The maximum voluntary ventilation is 64% of the predicted value.     According to Z-score criteria, FVC is under the area of curve, within standard deviation of -1.64, whereas the FEV1 is outside the area of curve, more than standard deviation of -1.64.  The FEV1/FVC is under the area of curve, within standard deviation of -1.64.     The diffusion capacity is moderately decreased.     IMPRESSION:  The above study shows mild restrictive ventilatory impairment.    Imaging personally reviewed:  Pulmonary CTA 6/9/25:  FINDINGS:  Lower neck: Unremarkable.     Heart: Severe cardiomegaly with multichamber enlargement.  Dilated  intrahepatic IVC and hepatic veins, likely due to chronic right heart  failure.  Severe mitral annular calcification.     Coronary artery: Severe coronary artery calcifications.     Aorta: Severe aortic valve calcification.  Fusiform ascending thoracic aortic  ectasia measuring 3.9 cm.     Pulmonary artery: No pulmonary emboli.  Enlarged main pulmonary artery  measuring 3.3 cm, can be seen in setting of pulmonary hypertension.     Pericardium and mediastinal soft tissue: Unremarkable.     Lymph nodes: Unremarkable.     Esophagus: Patulous esophagus.  Small hiatal hernia.     Airways and lungs: Right ground moderate  6/10/2025 at 8:07 AM      Note: This report was completed utilizing computer voice recognition software. Every effort has been made to ensure accuracy, however; inadvertent computerized transcription errors may be present      I personally saw, examined, and cared for the patient. I performed the substantive portion of the visit. Labs, medications, radiographs reviewed. I agree with history exam and plans detailed in NP note.    Acute resp failure with multifocal pneumonia. Has chronic interstitial changes with emphysema.  Continue abx awaiting culture data. Check video swallow.    Carlos Hammonds, DO

## 2025-06-10 NOTE — ED NOTES
ED TO INPATIENT SBAR HANDOFF    Patient Name: Trace Nassar   Preferred Name: Trace  : 1940  85 y.o.   Code Status Order: Prior  Telemetry Order: Yes  C-SSRS: Risk of Suicide: No Risk  Sitter no   Restraints:       Situation  Chief Complaint   Patient presents with    Leg Swelling     Bilateral lower leg swelling beginning yesterday.      Brief Description of Patient's Condition: pneumonia  Mental Status: oriented and alert    Background  Allergies:   Allergies   Allergen Reactions    Penicillins     Sulfa Antibiotics        Assessment  Vitals/MEWS:    Level of Consciousness: Alert (0)   Vitals:    25 1135 25 1531 25 1719 25 1828   BP: (!) 159/85 (!) 149/73 (!) 155/91 (!) 146/77   Pulse: 76 76 81 73   Resp: 18 18 18 19   Temp: 97.5 °F (36.4 °C)      TempSrc: Oral      SpO2: 98% 94% 95% 97%   Weight: 63 kg (139 lb)      Height: 1.651 m (5' 5\")        Cardiac Rhythm: Cardiac Rhythm: Normal sinus rhythm  Deterioration Index (DI): Deterioration Index: 25.28  Deterioration Index (DI) Interventions Performed:    O2 Flow Rate: O2 Flow Rate (L/min): 2 L/min  O2 Device: O2 Device: None (Room air)    Active Central Lines:                          Active Wounds:    Active Hills's:      Recommendation  Patient Belongings:    Additional Comments:   If any further questions, please call Sending RN at [phone]  Opportunity for questions and clarification were provided to (name of person notified and time):  Rick PEREZ at       Electronically signed by: Electronically signed by Maddie Flores RN on 2025 at 8:58 PM

## 2025-06-11 ENCOUNTER — APPOINTMENT (OUTPATIENT)
Dept: INTERVENTIONAL RADIOLOGY/VASCULAR | Age: 85
DRG: 291 | End: 2025-06-11
Payer: MEDICARE

## 2025-06-11 PROBLEM — I71.40 AAA (ABDOMINAL AORTIC ANEURYSM) WITHOUT RUPTURE: Status: RESOLVED | Noted: 2025-05-16 | Resolved: 2025-06-11

## 2025-06-11 PROBLEM — Z86.79 STATUS POST ENDOVASCULAR ANEURYSM REPAIR (EVAR): Status: ACTIVE | Noted: 2025-06-11

## 2025-06-11 PROBLEM — R09.89 DECREASED DORSALIS PEDIS PULSE: Status: ACTIVE | Noted: 2025-06-11

## 2025-06-11 PROBLEM — I71.43 INFRARENAL ABDOMINAL AORTIC ANEURYSM (AAA) WITHOUT RUPTURE: Chronic | Status: RESOLVED | Noted: 2025-04-30 | Resolved: 2025-06-11

## 2025-06-11 PROBLEM — R55 SYNCOPE: Status: RESOLVED | Noted: 2025-04-22 | Resolved: 2025-06-11

## 2025-06-11 PROBLEM — Z98.890 STATUS POST ENDOVASCULAR ANEURYSM REPAIR (EVAR): Status: ACTIVE | Noted: 2025-06-11

## 2025-06-11 PROBLEM — I70.209: Status: ACTIVE | Noted: 2025-06-11

## 2025-06-11 PROBLEM — M79.605 PAIN IN LEFT LEG: Status: ACTIVE | Noted: 2025-06-11

## 2025-06-11 LAB
ABO + RH BLD: NORMAL
ANION GAP SERPL CALCULATED.3IONS-SCNC: 11 MMOL/L (ref 7–16)
ARM BAND NUMBER: NORMAL
BLOOD BANK SAMPLE EXPIRATION: NORMAL
BLOOD GROUP ANTIBODIES SERPL: NEGATIVE
BUN SERPL-MCNC: 20 MG/DL (ref 8–23)
CALCIUM SERPL-MCNC: 9.2 MG/DL (ref 8.8–10.2)
CHLORIDE SERPL-SCNC: 103 MMOL/L (ref 98–107)
CO2 SERPL-SCNC: 25 MMOL/L (ref 22–29)
CREAT SERPL-MCNC: 1 MG/DL (ref 0.7–1.2)
ECHO BSA: 1.7 M2
ERYTHROCYTE [DISTWIDTH] IN BLOOD BY AUTOMATED COUNT: 17.7 % (ref 11.5–15)
GFR, ESTIMATED: 72 ML/MIN/1.73M2
GLUCOSE SERPL-MCNC: 128 MG/DL (ref 74–99)
HCT VFR BLD AUTO: 28 % (ref 37–54)
HGB BLD-MCNC: 8.5 G/DL (ref 12.5–16.5)
INR PPP: 3.4
MCH RBC QN AUTO: 27.4 PG (ref 26–35)
MCHC RBC AUTO-ENTMCNC: 30.4 G/DL (ref 32–34.5)
MCV RBC AUTO: 90.3 FL (ref 80–99.9)
MICROORGANISM SPEC CULT: NORMAL
PLATELET # BLD AUTO: 255 K/UL (ref 130–450)
PMV BLD AUTO: 10.6 FL (ref 7–12)
POTASSIUM SERPL-SCNC: 2.9 MMOL/L (ref 3.5–5.1)
PROTHROMBIN TIME: 38.2 SEC (ref 9.3–12.4)
RBC # BLD AUTO: 3.1 M/UL (ref 3.8–5.8)
SODIUM SERPL-SCNC: 138 MMOL/L (ref 136–145)
SPECIMEN DESCRIPTION: NORMAL
WBC OTHER # BLD: 5.5 K/UL (ref 4.5–11.5)

## 2025-06-11 PROCEDURE — 6360000002 HC RX W HCPCS

## 2025-06-11 PROCEDURE — 6370000000 HC RX 637 (ALT 250 FOR IP)

## 2025-06-11 PROCEDURE — 86850 RBC ANTIBODY SCREEN: CPT

## 2025-06-11 PROCEDURE — 86900 BLOOD TYPING SEROLOGIC ABO: CPT

## 2025-06-11 PROCEDURE — 93923 UPR/LXTR ART STDY 3+ LVLS: CPT

## 2025-06-11 PROCEDURE — 99233 SBSQ HOSP IP/OBS HIGH 50: CPT | Performed by: INTERNAL MEDICINE

## 2025-06-11 PROCEDURE — 86901 BLOOD TYPING SEROLOGIC RH(D): CPT

## 2025-06-11 PROCEDURE — 94640 AIRWAY INHALATION TREATMENT: CPT

## 2025-06-11 PROCEDURE — 93923 UPR/LXTR ART STDY 3+ LVLS: CPT | Performed by: SURGERY

## 2025-06-11 PROCEDURE — 80048 BASIC METABOLIC PNL TOTAL CA: CPT

## 2025-06-11 PROCEDURE — 36415 COLL VENOUS BLD VENIPUNCTURE: CPT

## 2025-06-11 PROCEDURE — 85027 COMPLETE CBC AUTOMATED: CPT

## 2025-06-11 PROCEDURE — 2140000000 HC CCU INTERMEDIATE R&B

## 2025-06-11 PROCEDURE — 94669 MECHANICAL CHEST WALL OSCILL: CPT

## 2025-06-11 PROCEDURE — 92526 ORAL FUNCTION THERAPY: CPT

## 2025-06-11 PROCEDURE — 6360000002 HC RX W HCPCS: Performed by: INTERNAL MEDICINE

## 2025-06-11 PROCEDURE — 2500000003 HC RX 250 WO HCPCS: Performed by: INTERNAL MEDICINE

## 2025-06-11 PROCEDURE — 2580000003 HC RX 258: Performed by: INTERNAL MEDICINE

## 2025-06-11 PROCEDURE — 6370000000 HC RX 637 (ALT 250 FOR IP): Performed by: INTERNAL MEDICINE

## 2025-06-11 PROCEDURE — 2700000000 HC OXYGEN THERAPY PER DAY

## 2025-06-11 PROCEDURE — 85610 PROTHROMBIN TIME: CPT

## 2025-06-11 PROCEDURE — 99223 1ST HOSP IP/OBS HIGH 75: CPT | Performed by: SURGERY

## 2025-06-11 RX ADMIN — IPRATROPIUM BROMIDE AND ALBUTEROL SULFATE 1 DOSE: 2.5; .5 SOLUTION RESPIRATORY (INHALATION) at 10:53

## 2025-06-11 RX ADMIN — SACUBITRIL AND VALSARTAN 1 TABLET: 24; 26 TABLET, FILM COATED ORAL at 19:48

## 2025-06-11 RX ADMIN — PETROLATUM: 420 OINTMENT TOPICAL at 19:49

## 2025-06-11 RX ADMIN — SODIUM CHLORIDE, PRESERVATIVE FREE 10 ML: 5 INJECTION INTRAVENOUS at 19:48

## 2025-06-11 RX ADMIN — IPRATROPIUM BROMIDE AND ALBUTEROL SULFATE 1 DOSE: 2.5; .5 SOLUTION RESPIRATORY (INHALATION) at 21:20

## 2025-06-11 RX ADMIN — ATORVASTATIN CALCIUM 40 MG: 40 TABLET, FILM COATED ORAL at 19:48

## 2025-06-11 RX ADMIN — ARFORMOTEROL TARTRATE 15 MCG: 15 SOLUTION RESPIRATORY (INHALATION) at 07:47

## 2025-06-11 RX ADMIN — FUROSEMIDE 20 MG: 10 INJECTION, SOLUTION INTRAMUSCULAR; INTRAVENOUS at 17:26

## 2025-06-11 RX ADMIN — ACETAMINOPHEN 650 MG: 325 TABLET ORAL at 12:47

## 2025-06-11 RX ADMIN — BUDESONIDE 500 MCG: 0.5 SUSPENSION RESPIRATORY (INHALATION) at 07:47

## 2025-06-11 RX ADMIN — IPRATROPIUM BROMIDE AND ALBUTEROL SULFATE 1 DOSE: 2.5; .5 SOLUTION RESPIRATORY (INHALATION) at 07:48

## 2025-06-11 RX ADMIN — SACUBITRIL AND VALSARTAN 1 TABLET: 24; 26 TABLET, FILM COATED ORAL at 08:10

## 2025-06-11 RX ADMIN — APIXABAN 5 MG: 5 TABLET, FILM COATED ORAL at 08:11

## 2025-06-11 RX ADMIN — CEFEPIME 2000 MG: 2 INJECTION, POWDER, FOR SOLUTION INTRAVENOUS at 23:39

## 2025-06-11 RX ADMIN — ARFORMOTEROL TARTRATE 15 MCG: 15 SOLUTION RESPIRATORY (INHALATION) at 21:20

## 2025-06-11 RX ADMIN — FUROSEMIDE 20 MG: 10 INJECTION, SOLUTION INTRAMUSCULAR; INTRAVENOUS at 08:11

## 2025-06-11 RX ADMIN — CEFEPIME 2000 MG: 2 INJECTION, POWDER, FOR SOLUTION INTRAVENOUS at 10:23

## 2025-06-11 RX ADMIN — CLOPIDOGREL BISULFATE 75 MG: 75 TABLET, FILM COATED ORAL at 08:11

## 2025-06-11 RX ADMIN — PETROLATUM: 420 OINTMENT TOPICAL at 02:15

## 2025-06-11 RX ADMIN — PETROLATUM: 420 OINTMENT TOPICAL at 08:11

## 2025-06-11 RX ADMIN — BUDESONIDE 500 MCG: 0.5 SUSPENSION RESPIRATORY (INHALATION) at 21:20

## 2025-06-11 RX ADMIN — IPRATROPIUM BROMIDE AND ALBUTEROL SULFATE 1 DOSE: 2.5; .5 SOLUTION RESPIRATORY (INHALATION) at 15:13

## 2025-06-11 RX ADMIN — PANTOPRAZOLE SODIUM 40 MG: 40 TABLET, DELAYED RELEASE ORAL at 05:08

## 2025-06-11 RX ADMIN — APIXABAN 5 MG: 5 TABLET, FILM COATED ORAL at 19:48

## 2025-06-11 RX ADMIN — SODIUM CHLORIDE, PRESERVATIVE FREE 10 ML: 5 INJECTION INTRAVENOUS at 08:10

## 2025-06-11 ASSESSMENT — PAIN SCALES - GENERAL
PAINLEVEL_OUTOF10: 3
PAINLEVEL_OUTOF10: 0

## 2025-06-11 ASSESSMENT — PAIN SCALES - WONG BAKER: WONGBAKER_NUMERICALRESPONSE: NO HURT

## 2025-06-11 ASSESSMENT — PAIN DESCRIPTION - DESCRIPTORS: DESCRIPTORS: ACHING;DISCOMFORT;SORE

## 2025-06-11 ASSESSMENT — PAIN - FUNCTIONAL ASSESSMENT: PAIN_FUNCTIONAL_ASSESSMENT: ACTIVITIES ARE NOT PREVENTED

## 2025-06-11 ASSESSMENT — PAIN DESCRIPTION - LOCATION: LOCATION: FOOT

## 2025-06-11 ASSESSMENT — PAIN DESCRIPTION - ORIENTATION: ORIENTATION: LEFT

## 2025-06-11 NOTE — PROGRESS NOTES
Inpatient Cardiology Consultation      Reason for Consult: Acute CHF    Consulting Physician: Dr. Looney     Requesting Physician: Dr. Nava    Date of Consultation: 6/11/2025    HISTORY OF PRESENT ILLNESS:   Trace Nassar  is a 85 y.o.  male known to [facility] cardiology Dr. Layne last seen in office 4/17/2025 in routine follow-up.  Most recently seen as inpatient consultation by Dr. Luther 5/5/2025 for possible TAVR, underwent EVAR to fix abdominal aortic aneurysm 5/16 (Dr. Marmolejo).  Scheduled for TAVR 6/18.  Seen in office by vascular surgery on 6/3/2025 where he was noted to have weakness, limited mobility secondary to SMITH and squeezing sensation in left leg.     Interim:  Denies chest pain by report legs are cold and complaining of leg pain as well    Past Medical History:     Past Medical History:      CAD/STEMI (7/2014)  PCI to LAD 2001  7/22/2014 nuclear stress test (Dr. Porter): Normal examination.  In particular, there is no evidence of left ventricular myocardium at risk for stress-induced ischemia.   7/29/2014 cardiac catheterization (Dr. Galvez): PCI/NANCY to proximal Lcx  4/28/2025 [facility] (Dr. Cadena):  Full report below. Noted not to be a candidate for CABG, plans for complicated PCI placement.   4/29/2025 [facility] for complicated PCI placement (Dr. Cadena): PCI to LAD  Ischemic cardiomyopathy/VHD  TTE 4/25/2016: EF 50%.  Moderate MR.  Mild concentric LVH. Probable posterobasal akinesis.  LA severely dilated.   TTE 11/25/2022: EF 40-45%.  Moderate asymmetric septal hypertrophy.  Mildly dilated LV.  Mild-moderately reduced LV function.  Indeterminate diastolic function.  No regional wall motion abnormalities.  LA severely dilated.  RA moderately enlarged.  Mild-moderate MR.  Mild mitral annular calcification.  Mild thickening of the mitral valve leaflets.  Mild TR. mild pulmonary hypertension RVSP 38 mmHg.  AV moderately sclerotic. Mean transaortic gradient (Doppler) 10 mmHg.  Mild ID.   TTE  4/23/2025: EF 40-45%.Moderate basal septal thickening. Severe hypokinesis of the following segments: basal inferior, apical septal and apical inferior. Abnormal diastolic function.  RV mildly dilated, moderately reduced function. Aortic Valve: Classic low flow/low gradient severe aortic stenosis with low EF. AV mean gradient is 21 mmHg. AV peak velocity is 3.5 m/s. LVOT:AV VTI Index is 0.21. AV area by continuity VTI is 0.8 cm2. AV Stroke Volume index is 33.2 mL/m2.  Moderate MR.  Moderate TR.  LA dilated.  TAVR postponed secondary to abdominal aortic aneurysm repair 5/16/2025.  Permanent A-fib with slow ventricular response initially diagnosed 11/2014  GARRY cardioversion 11/20/2014  Recurrence 12/2014 -decision made to rate control  On Coumadin 5mg   Syncope with collapse 4/22/2025  Bilateral carotid artery atherosclerosis  11/2021 carotid US: The right /left internal carotid artery demonstrates 0-50% stenosis. Moderate bilateral carotid plaque.   6.9 cm Abdominal aortic aneurysm s/p repair 5/16/2025  Hypertension -on lisinopril  Hyperlipidemia -on statin   ? T2 DM new onset  Chronic mild anemia (baseline Hgb 13-11.9)   CKD (baseline Cr 1.6-1.4)   History of CVA 7/2014   COPD  GERD -on Protonix  Diverticulosis and diverticulitis  Arthritis  Erectile dysfunction  Full code     [facility] 4/28/2025 (Dr. Cadena):   Left Main  The vessel is moderate in size.  Mid LM to Dist LM lesion, 50% stenosed. The lesion is moderately calcified.  Dist LM lesion, 60% stenosed. The lesion is moderately calcified.    Left Anterior Descending  The vessel is large. The vessel exhibits minimal luminal irregularities.  Ost LAD lesion, 90% stenosed. The lesion is severely calcified. The lesion was not previously treated. The stenosis was measured by a visual reading. The pre-interventional distal flow is normal (KENNEDY 3).. Pressure wire was not required. The plaque feature of this lesion is calcified. Second pressure wire was not required. Third  reviewing the rationale for the above recommendations and reviewing the patient's current medication list, problem list and results of all previously ordered testing.       Dung Looney MD  [facility]

## 2025-06-11 NOTE — PROGRESS NOTES
[facility] Quality Flow/Interdisciplinary Rounds Progress Note        Quality Flow Rounds held on June 11, 2025    Disciplines Attending:  Bedside Nurse, , , and Nursing Unit Leadership    Trace B Patris was admitted on 6/9/2025 11:53 AM    Anticipated Discharge Date:       Disposition:    Vidal Score:  Vidal Scale Score: 20    [facility] RISK OF UNPLANNED READMISSION 2.0             21.5 Total Score        Discussed patient goal for the day, patient clinical progression, and barriers to discharge.  The following Goal(s) of the Day/Commitment(s) have been identified:  Diagnostics - Report Results and Labs - Report Results      Betsey Benton RN  June 11, 2025

## 2025-06-11 NOTE — PROGRESS NOTES
Joe Cordero M.D.,[facility]  Daniel Szymanski D.O., QUEENIE., [facility]  Janis Fleming M.D.  Emelia Munson M.D.   Carlos Hammonds D.O.  Steven Barillas M.D.         Daily Pulmonary Progress Note    Patient:  Trace Nassar 85 y.o. male MRN: [id number]            Synopsis     We are following patient for pneumonia    \"CC\" left leg pain and swelling    Code status: FULL CODE      Subjective   6/10/25: Patient seen and examined lying in bed just completed a breathing treatment. I checked his saturation on room air and SpO2 was 87%, he came up to 93% on 3 liters. He is not in any distress. He tells me he does have trouble swallowing a lot and started coughing just this morning with water.     6/11/25: Patient was seen and examined lying in bed on 4 liters NC, SpO2 99%, I weaned him down to 2 liters. He reports feeling better. Passed video swallow with modifications of nectar thick liquids.      Review of Systems:  Constitutional: Denies fever, weight loss, night sweats, and fatigue  Skin: Denies pigmentation, dark lesions, and rashes   HEENT: difficulty swallowing at times.  Cardiovascular: history severe aortic stenosis, history heart failure  Respiratory: shortness of breath with exertion  Gastrointestinal: Denies nausea, vomiting, poor appetite, diarrhea, heartburn or reflux  Genitourinary: Denies dysuria, frequency, urgency or hematuria  Musculoskeletal: left leg pain and swelling  Neurological: Denies dizziness, vertigo, headache, and focal weakness  Psychological: Denies anxiety and depression  Endocrine: Denies heat intolerance and cold intolerance  Hematopoietic/Lymphatic: Denies bleeding problems and blood transfusions    24-hour events:  MBSS    Objective   OBJECTIVE:   /78   Pulse 80   Temp 97.1 °F (36.2 °C) (Temporal)   Resp 18   Ht 1.651 m (5' 5\")   Wt 63 kg (139 lb)   SpO2 98%   BMI 23.13 kg/m²   SpO2 Readings from Last 1 Encounters:   06/11/25 98%        I/O:    Intake/Output  valve  Supratherapeutic INR INR 8.1 on admission, warfarin on hold  Cardiology following for heart failure. On lasix 20 mg IV BID  Will need repeat chest ct 6-8 weeks      This plan of care was reviewed in collaboration with Dr. Hammonds    Electronically signed by KOSTA Hilliard CNP on 6/11/2025 at 11:31 AM      I personally saw, examined, and cared for the patient. I performed the substantive portion of the visit. Labs, medications, radiographs reviewed. I agree with history exam and plans detailed in NP note.    Continue Cefepime. Dysphagia diet per speech. Aspiration precautions.    Carlos Hammonds, DO

## 2025-06-11 NOTE — CARE COORDINATION
6/11/2025 Update CM note:  Chart reviewed and case discussed in IDR.  Pt remains on iv lasix bid, inr trending down but still elevated at 6.0. Pt has been placed on a special diet w/nectar consistency.  Remains on o2. Pt does not wear o2 at home.  Will need weaned and home o2 eval if unable to wean.  Pt is active w/[facility].  Referral placed in [facility].  Resume HHC orders placed.  Pt plans to return home upon discharge.  Son will transport home.  Will follow for transition of care needs.  Electronically signed by Josephine Norton RN CM on 6/11/2025 at 12:02 PM

## 2025-06-11 NOTE — PROGRESS NOTES
Attending notified patient experiencing cold/numbness/pain in L foot radiating up his leg. Pulses palpable but soft.

## 2025-06-11 NOTE — PROGRESS NOTES
[facility] Medicine    Subjective:  pt alert conversive pt now on mod texture diet      Current Facility-Administered Medications:     white petrolatum ointment, , Topical, BID, Steven Nava DO, Given at 06/11/25 0811    arformoterol tartrate (BROVANA) nebulizer solution 15 mcg, 15 mcg, Nebulization, BID RT, Marifer Painter APRN - CNP, 15 mcg at 06/11/25 0747    budesonide (PULMICORT) nebulizer suspension 500 mcg, 0.5 mg, Nebulization, BID RT, Marifer Painter APRN - CNP, 500 mcg at 06/11/25 0747    ipratropium 0.5 mg-albuterol 2.5 mg (DUONEB) nebulizer solution 1 Dose, 1 Dose, Inhalation, 4x Daily RT, Marifer Painter APRN - CNP, 1 Dose at 06/11/25 0748    albuterol (PROVENTIL) (2.5 MG/3ML) 0.083% nebulizer solution 2.5 mg, 2.5 mg, Nebulization, Q6H PRN, Marifer Painter APRN - CNP    furosemide (LASIX) injection 20 mg, 20 mg, IntraVENous, BID, Hattie Dee APRN - CNP, 20 mg at 06/11/25 0811    sacubitril-valsartan (ENTRESTO) 24-26 MG per tablet 1 tablet, 1 tablet, Oral, BID, Hattie Dee APRN - CNP, 1 tablet at 06/11/25 0810    clopidogrel (PLAVIX) tablet 75 mg, 75 mg, Oral, Daily, Steven Nava DO, 75 mg at 06/11/25 0811    apixaban (ELIQUIS) tablet 5 mg, 5 mg, Oral, BID, Steven Nava DO, 5 mg at 06/11/25 0811    atorvastatin (LIPITOR) tablet 40 mg, 40 mg, Oral, Nightly, Steven Nava DO, 40 mg at 06/10/25 2031    pantoprazole (PROTONIX) tablet 40 mg, 40 mg, Oral, QAM AC, Steven Nava DO, 40 mg at 06/11/25 0508    sodium chloride flush 0.9 % injection 5-40 mL, 5-40 mL, IntraVENous, 2 times per day, Steven Nava, , 10 mL at 06/11/25 0810    sodium chloride flush 0.9 % injection 5-40 mL, 5-40 mL, IntraVENous, PRN, Steven Nava,     0.9 % sodium chloride infusion, , IntraVENous, PRN, Steven Nava,     potassium chloride (KLOR-CON M) extended release tablet 40 mEq, 40 mEq, Oral, PRN **OR** potassium bicarb-citric acid (EFFER-K) effervescent tablet 40 mEq, 40 mEq,  06/10/2025 07:20 AM    BILITOT 2.0 06/09/2025 12:24 PM    ALKPHOS 123 06/09/2025 12:24 PM    AST 34 06/09/2025 12:24 PM    ALT 26 06/09/2025 12:24 PM     Warfarin PT/INR:    Lab Results   Component Value Date    INR 6.0 () 06/10/2025    INR 8.1 (HH) 06/09/2025    INR 2.0 05/13/2025    PROTIME 66.5 (H) 06/10/2025    PROTIME 90.6 (H) 06/09/2025    PROTIME 21.7 (H) 05/13/2025       Assessment:    Principal Problem:    Acute respiratory failure with hypoxia (HCC)  Active Problems:    Acute on chronic combined systolic and diastolic CHF (congestive heart failure) (HCC)  Resolved Problems:    * No resolved hospital problems. *      Plan:  Mod texture diet atb per pulm serial lab cont cardiac meds        Steven Nava DO  8:20 AM  6/11/2025

## 2025-06-11 NOTE — CONSULTS
Chief Complaint: Patient seen for evaluation of vascular status of the left leg          HPI: I was called today earlier in the afternoon, stat vascular consult for cold left leg with pain and tingling from the foot to the hip    When I came to see the patient, patient at the time was resting comfortably, tells me had some pain yesterday and also sudden pain this afternoon, they gave him some Tylenol and the pains resolved, at this time, he has no pain no tingling or numbness    Patient is on supplemental oxygen, denies any shortness of breath or chest pain    Patient has undergone endovascular repair of the abdominal aortic aneurysm by Dr. Marmolejo on 6 May    Patient can undergoing evaluation for a TAVR for severe aortic valve stenosis      Patient denies any focal lateralizing neurological symptoms like loss of speech, vision or loss of function of extremity    Patient can walk only short distance because of his shortness of breath and general frailty and denies any symptoms of rest pain    Patient has multiple comorbid risk factors, coronary artery disease with history of myocardial infarction coronary artery angioplasty stent placement, left ventricle dysfunction ejection fraction 40% plus, elevated right ventricular systolic pressure, severe aortic valve stenosis, cardiac arrhythmia including atrial fibrillation bilateral mild to moderate carotid artery disease but less than 50%, hypertension, hyperlipidemia, diabetes mellitus, chronic obstructive lung disease, GERD etc.    Allergies   Allergen Reactions    Penicillins     Sulfa Antibiotics        Current Facility-Administered Medications   Medication Dose Route Frequency Provider Last Rate Last Admin    ceFEPIme (MAXIPIME) 2,000 mg in sodium chloride 0.9 % 100 mL IVPB ([id number])  2,000 mg IntraVENous Q12H Carlos Hammonds DO        white petrolatum ointment   Topical BID Steven Nava DO   Given at 06/11/25 0811    arformoterol tartrate (BROVANA)     ondansetron (ZOFRAN-ODT) disintegrating tablet 4 mg  4 mg Oral Q8H PRN Steven Nava DO        Or    ondansetron (ZOFRAN) injection 4 mg  4 mg IntraVENous Q6H PRN Steven Nava DO        polyethylene glycol (GLYCOLAX) packet 17 g  17 g Oral Daily PRN Steven Nava DO        acetaminophen (TYLENOL) tablet 650 mg  650 mg Oral Q6H PRN Steven Nava DO   650 mg at 06/11/25 1247    Or    acetaminophen (TYLENOL) suppository 650 mg  650 mg Rectal Q6H PRN Steven Nava DO           Past Medical History:   Diagnosis Date    A-fib ([facility])     A-fib ([facility])     presently on coumadin    Arthritis     CAD (coronary artery disease)     CHF (congestive heart failure) ([facility])     COPD with acute exacerbation ([facility]) 11/26/2022    Diastolic heart failure ([facility]) 07/23/2014 7/23/14- echocardiogram revealed an LVEF of 55-60%, left atrium severely dilated, mild mitral and mild tricuspid regurgitation    Hyperlipidemia     Hypertension     Infrarenal abdominal aortic aneurysm (AAA) without rupture 04/30/2025    Pain in left leg 06/11/2025    On and off for the last 3 days, suddenly got worse today noontime, 11th June, mainly involving the left leg      Status post endovascular aneurysm repair (EVAR) 06/11/2025    May 6, 2025: Dr. Marmolejo      Unspecified cerebral artery occlusion with cerebral infarction     Son States no residual       Past Surgical History:   Procedure Laterality Date    ABDOMEN SURGERY      PERFORATED BOWEL    ABDOMINAL AORTIC ANEURYSM REPAIR, ENDOVASCULAR N/A 5/16/2025    ENDOVASCULAR REPAIR ABDOMINAL AORTIC ANEURYSM performed by Farrukh Marmolejo MD at [facility] OR    APPENDECTOMY      CARDIAC PROCEDURE N/A 4/28/2025    Left heart cath / coronary angiography performed by Raulito Cadena MD at [facility] CARDIAC CATH LAB    CARDIAC PROCEDURE N/A 4/30/2025    Percutaneous coronary intervention performed by Raulito Cadena MD at [facility] CARDIAC CATH LAB    CARDIAC PROCEDURE N/A 4/30/2025    Insert stent awilda

## 2025-06-11 NOTE — PLAN OF CARE
Problem: Chronic Conditions and Co-morbidities  Goal: Patient's chronic conditions and co-morbidity symptoms are monitored and maintained or improved  6/11/2025 1929 by Susanna Mullen RN  Outcome: Progressing  6/11/2025 1003 by Cynthia Guerrier RN  Outcome: Progressing     Problem: Discharge Planning  Goal: Discharge to home or other facility with appropriate resources  6/11/2025 1929 by Susanna Mullen RN  Outcome: Progressing  6/11/2025 1003 by Cynthia Guerrier RN  Outcome: Progressing     Problem: Safety - Adult  Goal: Free from fall injury  6/11/2025 1929 by Susanna Mullen RN  Outcome: Progressing  6/11/2025 1003 by Cynthia Guerrier RN  Outcome: Progressing     Problem: ABCDS Injury Assessment  Goal: Absence of physical injury  6/11/2025 1929 by Susanna Mullen RN  Outcome: Progressing  6/11/2025 1003 by Cynthia Guerrier RN  Outcome: Progressing      none

## 2025-06-12 ENCOUNTER — APPOINTMENT (OUTPATIENT)
Dept: CT IMAGING | Age: 85
DRG: 291 | End: 2025-06-12
Attending: SURGERY
Payer: MEDICARE

## 2025-06-12 LAB
ANION GAP SERPL CALCULATED.3IONS-SCNC: 10 MMOL/L (ref 7–16)
BUN SERPL-MCNC: 19 MG/DL (ref 8–23)
CALCIUM SERPL-MCNC: 7.8 MG/DL (ref 8.8–10.2)
CHLORIDE SERPL-SCNC: 104 MMOL/L (ref 98–107)
CK SERPL-CCNC: 294 U/L (ref 0–190)
CO2 SERPL-SCNC: 27 MMOL/L (ref 22–29)
CREAT SERPL-MCNC: 1.1 MG/DL (ref 0.7–1.2)
ERYTHROCYTE [DISTWIDTH] IN BLOOD BY AUTOMATED COUNT: 17.7 % (ref 11.5–15)
GFR, ESTIMATED: 68 ML/MIN/1.73M2
GLUCOSE SERPL-MCNC: 117 MG/DL (ref 74–99)
HCT VFR BLD AUTO: 28.5 % (ref 37–54)
HGB BLD-MCNC: 8.5 G/DL (ref 12.5–16.5)
INR PPP: 3.3
MCH RBC QN AUTO: 27.3 PG (ref 26–35)
MCHC RBC AUTO-ENTMCNC: 29.8 G/DL (ref 32–34.5)
MCV RBC AUTO: 91.6 FL (ref 80–99.9)
PARTIAL THROMBOPLASTIN TIME: 45.3 SEC (ref 24.5–35.1)
PLATELET # BLD AUTO: 267 K/UL (ref 130–450)
PMV BLD AUTO: 11.2 FL (ref 7–12)
POTASSIUM SERPL-SCNC: 3.1 MMOL/L (ref 3.5–5.1)
PROTHROMBIN TIME: 36.3 SEC (ref 9.3–12.4)
RBC # BLD AUTO: 3.11 M/UL (ref 3.8–5.8)
SODIUM SERPL-SCNC: 142 MMOL/L (ref 136–145)
WBC OTHER # BLD: 5.2 K/UL (ref 4.5–11.5)

## 2025-06-12 PROCEDURE — 97535 SELF CARE MNGMENT TRAINING: CPT

## 2025-06-12 PROCEDURE — 85027 COMPLETE CBC AUTOMATED: CPT

## 2025-06-12 PROCEDURE — 80048 BASIC METABOLIC PNL TOTAL CA: CPT

## 2025-06-12 PROCEDURE — 85730 THROMBOPLASTIN TIME PARTIAL: CPT

## 2025-06-12 PROCEDURE — 6370000000 HC RX 637 (ALT 250 FOR IP): Performed by: INTERNAL MEDICINE

## 2025-06-12 PROCEDURE — 2140000000 HC CCU INTERMEDIATE R&B

## 2025-06-12 PROCEDURE — 82550 ASSAY OF CK (CPK): CPT

## 2025-06-12 PROCEDURE — 6360000004 HC RX CONTRAST MEDICATION: Performed by: RADIOLOGY

## 2025-06-12 PROCEDURE — 2700000000 HC OXYGEN THERAPY PER DAY

## 2025-06-12 PROCEDURE — 75635 CT ANGIO ABDOMINAL ARTERIES: CPT

## 2025-06-12 PROCEDURE — 6370000000 HC RX 637 (ALT 250 FOR IP)

## 2025-06-12 PROCEDURE — 94669 MECHANICAL CHEST WALL OSCILL: CPT

## 2025-06-12 PROCEDURE — 6360000002 HC RX W HCPCS

## 2025-06-12 PROCEDURE — 36415 COLL VENOUS BLD VENIPUNCTURE: CPT

## 2025-06-12 PROCEDURE — 2500000003 HC RX 250 WO HCPCS: Performed by: INTERNAL MEDICINE

## 2025-06-12 PROCEDURE — 92526 ORAL FUNCTION THERAPY: CPT

## 2025-06-12 PROCEDURE — 6360000002 HC RX W HCPCS: Performed by: INTERNAL MEDICINE

## 2025-06-12 PROCEDURE — 97165 OT EVAL LOW COMPLEX 30 MIN: CPT

## 2025-06-12 PROCEDURE — 97530 THERAPEUTIC ACTIVITIES: CPT

## 2025-06-12 PROCEDURE — 94640 AIRWAY INHALATION TREATMENT: CPT

## 2025-06-12 PROCEDURE — 99233 SBSQ HOSP IP/OBS HIGH 50: CPT | Performed by: INTERNAL MEDICINE

## 2025-06-12 PROCEDURE — 85610 PROTHROMBIN TIME: CPT

## 2025-06-12 PROCEDURE — 2580000003 HC RX 258: Performed by: INTERNAL MEDICINE

## 2025-06-12 RX ORDER — IOPAMIDOL 755 MG/ML
105 INJECTION, SOLUTION INTRAVASCULAR
Status: COMPLETED | OUTPATIENT
Start: 2025-06-12 | End: 2025-06-12

## 2025-06-12 RX ADMIN — SACUBITRIL AND VALSARTAN 1 TABLET: 24; 26 TABLET, FILM COATED ORAL at 19:56

## 2025-06-12 RX ADMIN — CLOPIDOGREL BISULFATE 75 MG: 75 TABLET, FILM COATED ORAL at 08:31

## 2025-06-12 RX ADMIN — IOPAMIDOL 105 ML: 755 INJECTION, SOLUTION INTRAVENOUS at 08:12

## 2025-06-12 RX ADMIN — ACETAMINOPHEN 650 MG: 325 TABLET ORAL at 10:23

## 2025-06-12 RX ADMIN — ARFORMOTEROL TARTRATE 15 MCG: 15 SOLUTION RESPIRATORY (INHALATION) at 19:18

## 2025-06-12 RX ADMIN — IPRATROPIUM BROMIDE AND ALBUTEROL SULFATE 1 DOSE: 2.5; .5 SOLUTION RESPIRATORY (INHALATION) at 08:58

## 2025-06-12 RX ADMIN — BUDESONIDE 500 MCG: 0.5 SUSPENSION RESPIRATORY (INHALATION) at 08:58

## 2025-06-12 RX ADMIN — IPRATROPIUM BROMIDE AND ALBUTEROL SULFATE 1 DOSE: 2.5; .5 SOLUTION RESPIRATORY (INHALATION) at 15:44

## 2025-06-12 RX ADMIN — SODIUM CHLORIDE, PRESERVATIVE FREE 10 ML: 5 INJECTION INTRAVENOUS at 08:34

## 2025-06-12 RX ADMIN — PETROLATUM: 420 OINTMENT TOPICAL at 19:56

## 2025-06-12 RX ADMIN — ACETAMINOPHEN 650 MG: 325 TABLET ORAL at 01:21

## 2025-06-12 RX ADMIN — ARFORMOTEROL TARTRATE 15 MCG: 15 SOLUTION RESPIRATORY (INHALATION) at 08:58

## 2025-06-12 RX ADMIN — ATORVASTATIN CALCIUM 40 MG: 40 TABLET, FILM COATED ORAL at 19:56

## 2025-06-12 RX ADMIN — SACUBITRIL AND VALSARTAN 1 TABLET: 24; 26 TABLET, FILM COATED ORAL at 08:31

## 2025-06-12 RX ADMIN — POTASSIUM BICARBONATE 40 MEQ: 782 TABLET, EFFERVESCENT ORAL at 10:23

## 2025-06-12 RX ADMIN — CEFEPIME 2000 MG: 2 INJECTION, POWDER, FOR SOLUTION INTRAVENOUS at 22:52

## 2025-06-12 RX ADMIN — CEFEPIME 2000 MG: 2 INJECTION, POWDER, FOR SOLUTION INTRAVENOUS at 10:30

## 2025-06-12 RX ADMIN — SODIUM CHLORIDE, PRESERVATIVE FREE 10 ML: 5 INJECTION INTRAVENOUS at 19:56

## 2025-06-12 RX ADMIN — PETROLATUM: 420 OINTMENT TOPICAL at 08:36

## 2025-06-12 RX ADMIN — BUDESONIDE 500 MCG: 0.5 SUSPENSION RESPIRATORY (INHALATION) at 19:18

## 2025-06-12 RX ADMIN — IPRATROPIUM BROMIDE AND ALBUTEROL SULFATE 1 DOSE: 2.5; .5 SOLUTION RESPIRATORY (INHALATION) at 12:28

## 2025-06-12 RX ADMIN — APIXABAN 5 MG: 5 TABLET, FILM COATED ORAL at 19:55

## 2025-06-12 RX ADMIN — IPRATROPIUM BROMIDE AND ALBUTEROL SULFATE 1 DOSE: 2.5; .5 SOLUTION RESPIRATORY (INHALATION) at 19:18

## 2025-06-12 RX ADMIN — APIXABAN 5 MG: 5 TABLET, FILM COATED ORAL at 08:31

## 2025-06-12 RX ADMIN — FUROSEMIDE 20 MG: 10 INJECTION, SOLUTION INTRAMUSCULAR; INTRAVENOUS at 18:20

## 2025-06-12 RX ADMIN — FUROSEMIDE 20 MG: 10 INJECTION, SOLUTION INTRAMUSCULAR; INTRAVENOUS at 08:31

## 2025-06-12 ASSESSMENT — PAIN DESCRIPTION - ORIENTATION
ORIENTATION: LEFT
ORIENTATION: LEFT

## 2025-06-12 ASSESSMENT — PAIN DESCRIPTION - LOCATION
LOCATION: LEG
LOCATION: LEG;FOOT

## 2025-06-12 ASSESSMENT — PAIN SCALES - GENERAL
PAINLEVEL_OUTOF10: 3
PAINLEVEL_OUTOF10: 0
PAINLEVEL_OUTOF10: 6

## 2025-06-12 ASSESSMENT — PAIN DESCRIPTION - DESCRIPTORS
DESCRIPTORS: DISCOMFORT;PRESSURE;SORE
DESCRIPTORS: ACHING;DISCOMFORT;DULL

## 2025-06-12 NOTE — NURSE NAVIGATOR
Patient's chart updated to reflect:      .    - HF care plan, HF education points and HF discharge instructions.  -Orders: 2 gram sodium diet, daily weights, I/O.  -PCP or cardiology follow up appointments to be scheduled within 7 days of hospital discharge.  -CHF education session will be provided to the patient prior to hospital discharge.     Irina Singh, RN, CHFN   Heart Failure Navigator      Future Appointments   Date Time Provider Department Center   6/16/2025  7:45 AM [facility] CHF ROOM 3 [facility]F [facility]   6/30/2025  8:30 AM [facility] CHF ROOM 2 YZ CHF [facility]   7/3/2025  1:30 PM [facility] US RM 1 SEYZ US [facility] Rad/Car   7/7/2025 11:15 AM Onelia Wallace PA [facility]   8/5/2025  1:00 PM Farrukh Marmolejo MD [facility]/MED [facility]

## 2025-06-12 NOTE — PROGRESS NOTES
Vascular Surgery Progress Note    CC: LLE pain    HISTORY:  The patient is awake, alert, and oriented.  Resting comfortably in bed.  Has not had the pain in the left leg again he experienced prior to coming to the hospital.  Still has a squeezing type feeling in the left leg but not painful.      IMPRESSION:       PLAN:  Pts pain in the left leg resolved with Tylenol.  His symptoms of left leg pain are not consistent with arterial disease.  CTA runoff reviewed he does have significant LLE arterial disease but not likely the cause of his pain.  He does have leg swelling and was advised to elevate his legs as much as possible.  Continue to wean O2 as able.  No interventions planned at this time.     Will discuss with Dr. Marmolejo    Patient Active Problem List   Diagnosis Code    Atrial fibrillation with slow ventricular response ([facility]) I48.91    Hyperlipidemia with target LDL less than 100 E78.5    History of CVA (cerebrovascular accident) Z86.73    CAD (coronary artery disease), native coronary artery I25.10    Mitral insufficiency I34.0    Carotid stenosis, asymptomatic, bilateral I65.23    Acute on chronic combined systolic and diastolic CHF (congestive heart failure) ([facility]) I50.43    Anemia D64.9    Nonrheumatic aortic valve stenosis I35.0    Heart failure with mid-range ejection fraction (HFmEF) ([facility]) I50.22    Bradycardia R00.1    Severe aortic stenosis I35.0    Permanent atrial fibrillation ([facility]) I48.21    Symptoms involving cardiovascular system R09.89    Nodular calcific aortic valve stenosis I35.0    Acute respiratory failure with hypoxia ([facility]) J96.01    Decreased dorsalis pedis pulse R09.89    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

## 2025-06-12 NOTE — PLAN OF CARE
Problem: Chronic Conditions and Co-morbidities  Goal: Patient's chronic conditions and co-morbidity symptoms are monitored and maintained or improved  6/12/2025 0843 by Nikia Kyle RN  Outcome: Progressing     Problem: Discharge Planning  Goal: Discharge to home or other facility with appropriate resources  6/12/2025 0843 by Nikia Kyle RN  Outcome: Progressing     Problem: Safety - Adult  Goal: Free from fall injury  6/12/2025 0843 by Nikia Kyle RN  Outcome: Progressing     Problem: ABCDS Injury Assessment  Goal: Absence of physical injury  6/12/2025 0843 by Nikia Kyle RN  Outcome: Progressing

## 2025-06-12 NOTE — PROGRESS NOTES
Occupational Therapy  OCCUPATIONAL THERAPY INITIAL EVALUATION    [facility]  [address] [place], [place]      Date:2025                                                Patient Name: Trace Nassar  MRN: [id number]  : 1940  Room: [address] [place] [address]-A    Evaluating OT: Bryan Marti OTR/L #[id number]     Referring Provider: Steven Nava DO   Specific Provider Orders/Date: OT eval and treat 25    Diagnosis: Acute respiratory failure with hypoxia (HCC) [J96.01]   Pt admitted to hospital with leg swelling and leg pain     Pertinent Medical History:  has a past medical history of A-fib (HCC), A-fib (HCC), Arthritis, CAD (coronary artery disease), CHF (congestive heart failure) (HCC), COPD with acute exacerbation (HCC), Diastolic heart failure (HCC), Hyperlipidemia, Hypertension, Infrarenal abdominal aortic aneurysm (AAA) without rupture, Pain in left leg, Status post endovascular aneurysm repair (EVAR), and Unspecified cerebral artery occlusion with cerebral infarction.       Precautions:  Fall Risk, O2, continuous pulse ox, monitor BP (hx orthostatic hypotension)    Assessment of current deficits    [x] Functional mobility  [x]ADLs  [x] Strength               []Cognition    [x] Functional transfers   [x] IADLs         [x] Safety Awareness   [x]Endurance    [] Fine Coordination              [x] Balance      [] Vision/perception   []Sensation     []Gross Motor Coordination  [] ROM  [] Delirium                   [] Motor Control     OT PLAN OF CARE   OT POC based on physician orders, patient diagnosis and results of clinical assessment    Frequency/Duration 1-5 days/wk for 2 weeks PRN   Specific OT Treatment Interventions to include:   * Instruction/training on adapted ADL techniques and AE recommendations to increase functional independence within precautions       * Training on energy conservation strategies, correct breathing pattern and  techniques to improve independence/tolerance for self-care routine  * Functional transfer/mobility training/DME recommendations for increased independence, safety, and fall prevention  * Patient/Family education to increase follow through with safety techniques and functional independence  * Recommendation of environmental modifications for increased safety with functional transfers/mobility and ADLs  * Therapeutic exercise to improve motor endurance, ROM, and functional strength for ADLs/functional transfers  * Therapeutic activities to facilitate/challenge dynamic balance, stand tolerance for increased safety and independence with ADLs    Recommended Adaptive Equipment:  TBD     Home Living: Pt lives alone in a bi-level home with 1 AMADOR ; 5 steps with B hand rails to bath and 5 steps and 1 hand rail to bed room    Bathroom setup: walk-in shower   Equipment owned: shower chair     Prior Level of Function: Independent  with ADLs , Independent  with IADLs; ambulated without AD   Driving: yes   Occupation: na    Pain Level: Pt denies pain this session    Cognition: A&O: 4/4; Follows 2 step directions   Memory: good   Sequencing: good   Problem solving: good   Judgement/safety: fair      Functional Assessment:  AM-PAC Daily Activity Raw Score: 19/24   Initial Eval Status  Date: 6/12/2025   Treatment Status  Date: STGs = LTGs  Time frame: 10-14 days   Feeding Independent      Grooming Stand by Assist     Standing   Modified [place]    UB Dressing Stand by Assist     Modified [place]    LB Dressing Minimal Assist     Modified [place]    Bathing Minimal Assist  Modified [place]    Toileting Minimal Assist     Modified [place]    Bed Mobility  Supine to sit: Stand by Assist   Sit to supine: Stand by Assist   Supine to sit: Modified [place]   Sit to supine: Modified [place]    Functional Transfers Stand by Assist       Modified [place]    Functional Mobility Minimal Assist

## 2025-06-12 NOTE — PROGRESS NOTES
Joe Cordero M.D.,[facility]  Daniel Szymanski D.O., QUEENIE., [facility]  Janis Fleming M.D.  Emelia Munson M.D.   Carlos Hammonds D.O.  Steven Barillas M.D.         Daily Pulmonary Progress Note    Patient:  Trace Nassar 85 y.o. male MRN: [id number]            Synopsis     We are following patient for pneumonia    \"CC\" left leg pain and swelling    Code status: FULL CODE      Subjective   6/10/25: Patient seen and examined lying in bed just completed a breathing treatment. I checked his saturation on room air and SpO2 was 87%, he came up to 93% on 3 liters. He is not in any distress. He tells me he does have trouble swallowing a lot and started coughing just this morning with water.     6/11/25: Patient was seen and examined lying in bed on 4 liters NC, SpO2 99%, I weaned him down to 2 liters. He reports feeling better. Passed video swallow with modifications of nectar thick liquids.    6/12/25: patient seen and examined lying in bed on 3 liters NC, SpO2 95%, I weaned him down to 2 L. He is doing ok and says his breathing is better. Has minimal coughing. He was reporting left foot numbness and nursing discovered the foot was cold so Vascular Surgery was consulted. Vascular studies were done showing left iliac and femoral popliteal artery disease. A CTA aorta with run off done today showing severe left femoropopliteal artery disease with occluded distal left superficial femoral artery and a severely disease above knee popliteal artery.  Respiratory culture now growing pseudomonas.      Review of Systems:  Constitutional: Denies fever, weight loss, night sweats, and fatigue  Skin: Denies pigmentation, dark lesions, and rashes   HEENT: difficulty swallowing at times.  Cardiovascular: history severe aortic stenosis, history heart failure  Respiratory: shortness of breath with exertion  Gastrointestinal: Denies nausea, vomiting, poor appetite, diarrhea, heartburn or reflux  Genitourinary: Denies dysuria,

## 2025-06-12 NOTE — PROGRESS NOTES
Hospital Medicine    Subjective:  pt with l leg pain yesterday seen by vascular currently pt seen in ct scan dept this am      Current Facility-Administered Medications:     iopamidol (ISOVUE-370) 76 % injection 105 mL, 105 mL, IntraVENous, ONCE PRN, Jay, Lovely Guerra MD    [COMPLETED] ceFEPIme (MAXIPIME) 2,000 mg in sodium chloride 0.9 % 100 mL IVPB ([id number]), 2,000 mg, IntraVENous, Once, Stopped at 06/11/25 1136 **FOLLOWED BY** ceFEPIme (MAXIPIME) 2,000 mg in sodium chloride 0.9 % 100 mL IVPB ([id number]), 2,000 mg, IntraVENous, Q12H, Carlos Hammonds, DO, Stopped at 06/12/25 0521    white petrolatum ointment, , Topical, BID, Steven Nava, DO, Given at 06/11/25 1949    arformoterol tartrate (BROVANA) nebulizer solution 15 mcg, 15 mcg, Nebulization, BID RT, Marifer Painter APRN - CNP, 15 mcg at 06/11/25 2120    budesonide (PULMICORT) nebulizer suspension 500 mcg, 0.5 mg, Nebulization, BID RT, Marifer Painter APRN - CNP, 500 mcg at 06/11/25 2120    ipratropium 0.5 mg-albuterol 2.5 mg (DUONEB) nebulizer solution 1 Dose, 1 Dose, Inhalation, 4x Daily RT, Marifer Painter APRN - CNP, 1 Dose at 06/11/25 2120    albuterol (PROVENTIL) (2.5 MG/3ML) 0.083% nebulizer solution 2.5 mg, 2.5 mg, Nebulization, Q6H PRN, Marifer Painter APRN - CNP    furosemide (LASIX) injection 20 mg, 20 mg, IntraVENous, BID, Hattie Dee APRN - CNP, 20 mg at 06/11/25 1726    sacubitril-valsartan (ENTRESTO) 24-26 MG per tablet 1 tablet, 1 tablet, Oral, BID, Hattie Dee APRN - CNP, 1 tablet at 06/11/25 1948    clopidogrel (PLAVIX) tablet 75 mg, 75 mg, Oral, Daily, Steven Nava, , 75 mg at 06/11/25 0811    apixaban (ELIQUIS) tablet 5 mg, 5 mg, Oral, BID, Steven Nava, , 5 mg at 06/11/25 1948    atorvastatin (LIPITOR) tablet 40 mg, 40 mg, Oral, Nightly, Steven Nava, , 40 mg at 06/11/25 1948    pantoprazole (PROTONIX) tablet 40 mg, 40 mg, Oral, QAM AC, Steven Nava, , 40 mg at 06/11/25 0508    sodium chloride  flush 0.9 % injection 5-40 mL, 5-40 mL, IntraVENous, 2 times per day, Steven Nava, , 10 mL at 06/11/25 1948    sodium chloride flush 0.9 % injection 5-40 mL, 5-40 mL, IntraVENous, PRN, Steven Nava,     0.9 % sodium chloride infusion, , IntraVENous, PRN, Steven Nava,     potassium chloride (KLOR-CON M) extended release tablet 40 mEq, 40 mEq, Oral, PRN **OR** potassium bicarb-citric acid (EFFER-K) effervescent tablet 40 mEq, 40 mEq, Oral, PRN **OR** potassium chloride 10 mEq/100 mL IVPB (Peripheral Line), 10 mEq, IntraVENous, PRN, Steven Nava DO    magnesium sulfate 2000 mg in 50 mL IVPB premix, 2,000 mg, IntraVENous, PRN, Steven Nava,     ondansetron (ZOFRAN-ODT) disintegrating tablet 4 mg, 4 mg, Oral, Q8H PRN **OR** ondansetron (ZOFRAN) injection 4 mg, 4 mg, IntraVENous, Q6H PRN, Steven Nava,     polyethylene glycol (GLYCOLAX) packet 17 g, 17 g, Oral, Daily PRN, Steven Nava DO    acetaminophen (TYLENOL) tablet 650 mg, 650 mg, Oral, Q6H PRN, 650 mg at 06/12/25 0121 **OR** acetaminophen (TYLENOL) suppository 650 mg, 650 mg, Rectal, Q6H PRN, Steven Nava,     Objective:    /70   Pulse 81   Temp 97.2 °F (36.2 °C) (Temporal)   Resp 18   Ht 1.651 m (5' 5\")   Wt 63 kg (139 lb)   SpO2 95%   BMI 23.13 kg/m²     Heart:  irreg  Lungs:  ctab  Abd: + bs soft non tender  Extrem:  l foot min cool to touch compared to r    CBC with Differential:    Lab Results   Component Value Date/Time    WBC 5.2 06/12/2025 05:06 AM    RBC 3.11 06/12/2025 05:06 AM    HGB 8.5 06/12/2025 05:06 AM    HCT 28.5 06/12/2025 05:06 AM     06/12/2025 05:06 AM    MCV 91.6 06/12/2025 05:06 AM    MCH 27.3 06/12/2025 05:06 AM    MCHC 29.8 06/12/2025 05:06 AM    RDW 17.7 06/12/2025 05:06 AM    LYMPHOPCT 11 06/09/2025 12:24 PM    MONOPCT 10 06/09/2025 12:24 PM    EOSPCT 2 06/09/2025 12:24 PM    BASOPCT 1 06/09/2025 12:24 PM    MONOSABS 0.67 06/09/2025 12:24 PM    LYMPHSABS 0.74  06/09/2025 12:24 PM    EOSABS 0.11 06/09/2025 12:24 PM    BASOSABS 0.07 06/09/2025 12:24 PM     CMP:    Lab Results   Component Value Date/Time     06/12/2025 05:06 AM    K 3.1 06/12/2025 05:06 AM    K 4.0 11/25/2022 10:10 AM     06/12/2025 05:06 AM    CO2 27 06/12/2025 05:06 AM    BUN 19 06/12/2025 05:06 AM    CREATININE 1.1 06/12/2025 05:06 AM    GFRAA 59 05/26/2019 09:24 AM    LABGLOM 68 06/12/2025 05:06 AM    LABGLOM 55 11/26/2022 01:17 PM    GLUCOSE 117 06/12/2025 05:06 AM    CALCIUM 7.8 06/12/2025 05:06 AM    BILITOT 2.0 06/09/2025 12:24 PM    ALKPHOS 123 06/09/2025 12:24 PM    AST 34 06/09/2025 12:24 PM    ALT 26 06/09/2025 12:24 PM     Warfarin PT/INR:    Lab Results   Component Value Date    INR 3.3 06/12/2025    INR 3.4 06/11/2025    INR 6.0 (HH) 06/10/2025    PROTIME 36.3 (H) 06/12/2025    PROTIME 38.2 (H) 06/11/2025    PROTIME 66.5 (H) 06/10/2025       Assessment:    Principal Problem:    Acute respiratory failure with hypoxia (HCC)  Active Problems:    Acute on chronic combined systolic and diastolic CHF (congestive heart failure) (HCC)    Decreased dorsalis pedis pulse    Femoral-popliteal atherosclerosis    Status post endovascular aneurysm repair (EVAR)    Pain in left leg  Resolved Problems:    * No resolved hospital problems. *      Plan:  Replace k cont as per vascular cta in process        Steven Nava DO  8:04 AM  6/12/2025

## 2025-06-12 NOTE — CONSULTS
[facility]   Inpatient CHF Nurse Navigator Consult        Cardiologist: Dr. Layne  Primary Care Physician: Silvino Garcia DO Konstantinos B Maday is a 85 y.o. (1940) male with a history of HFmrEF, most recent EF:  Lab Results   Component Value Date    LVEF 40 04/23/2025    LVEFMODE Echo 04/23/2025       Patient was awake and alert, laying in bed during the consultation and is agreeable to heart failure education. He was engaged and asked appropriate questions throughout the education session.     Barriers identified during consult contributing to HF Hospitalization: overwhelmed by complexity of regimen.     [] Limited medication adherence   [] Poor health literacy, education regarding HF medications provided   [] Pill box provided to patient  [] Difficulty affording medications  [] Difficulty obtaining/ managing medications  [] Prescription assistance information given     [] Not weighing themselves daily  [x] Weight log provided for easy monitoring  [] Scale provided     [] Not following low sodium diet  [] Food insecurity   [x] 2 gram sodium diet education provided   [] Low sodium recipes provided  [] Sodium free seasoning provided   [] Low sodium meal delivery options given to patient  [] Dietician consulted     [] Lack of transportation to appointments     [] Depression, given chronic illness  [] Primary team notified     [] Goals of care need addressed  [] Palliative care consulted     [] CHF community health worker Cindy Maldonado consulted [phone], to assist with         Chart Reviewed:  Diet: ADULT DIET; Dysphagia - Minced and Moist; Low Fat/Low Chol/High Fiber/RAINER; Mildly Thick (Nectar)   Daily Weights: Patient Vitals for the past 96 hrs (Last 3 readings):   Weight   06/09/25 1135 63 kg (139 lb)     I/O:   Intake/Output Summary (Last 24 hours) at 6/12/2025 1916  Last data filed at 6/12/2025 1916  Gross per 24 hour   Intake 560 ml   Output 825 ml   Net -265

## 2025-06-12 NOTE — PROGRESS NOTES
Physical Therapy Initial Evaluation    Name: Trace Nassar  : 1940  MRN: [id number]      Date of Service: 2025    Evaluating PT:  Steven Mejia, PT [id number]    Referring provider/PT Order:  PT Eval and Treat   25  PT eval and treat  Start:  25,   End:  25,   ONE TIME,   Standing Count:  1 Occurrences,   R         Stevne Nava, DO     Room #:  6503/6503-A  Diagnosis:  Acute respiratory failure with hypoxia (HCC) [J96.01]  PMHx/PSHx:     has a past medical history of A-fib (HCC), A-fib (HCC), Arthritis, CAD (coronary artery disease), CHF (congestive heart failure) (HCC), COPD with acute exacerbation (HCC), Diastolic heart failure (HCC), Hyperlipidemia, Hypertension, Infrarenal abdominal aortic aneurysm (AAA) without rupture, Pain in left leg, Status post endovascular aneurysm repair (EVAR), and Unspecified cerebral artery occlusion with cerebral infarction.    has a past surgical history that includes Coronary angioplasty with stent (2014); Coronary angioplasty with stent (2014); Cardioversion (2014); Colonoscopy; hernia repair; Abdomen surgery; eye surgery; Appendectomy; Carotid endarterectomy (Left, 2015); Cardiac procedure (N/A, 2025); Cardiac procedure (N/A, 2025); Cardiac procedure (N/A, 2025); and AAA repair, endovascular (N/A, 2025).     Procedure/Surgery:  none  Precautions:  Falls, O2, FWB (full weight bearing) Bilateral LE, hx hypotension  Equipment Needs: Patient has needed equipment ,    SUBJECTIVE:    Patient lives alone  in a bilevel home 5 steps   to living level with No steps to enter.  Bed is on 1 floor and bath is on 1 floor.  Patient ambulated independently  occasionally with fww PTA. Equipment owned: Wheeled Walker,      OBJECTIVE:   Initial Evaluation  Date: 25 Treatment Short Term/ Long Term   Goals   AM-PAC 6 Clicks       Was pt agreeable to Eval/treatment? Yes      Does pt have pain? no    evaluation. Patient was agreeable to intervention.  Results of the functional assessment are noted above.  Upon entering the room patient was found supine in bed. Sat EOB x 10 minutes to increase dynamic sitting balance and activity tolerance.  STS and gait completed no device HHA. No LOB noted. Distance limited by endurance.    At end of session, patient in bed with HOB elevated patient wanting to rest  call light and phone within reach,  all lines and tubes intact, nursing notified.   This patient can benefit from the continuation of skilled PT home with home health PT to maximize functional level and return to PLOF.       Treatment:  Patient completed and was instructed in the following treatment:      *STS and transfer training - educated on hand/foot placement, safety, and sequencing during STS and pivot transfers without AD  *Gait training - verbal cues for upright posture, and safety during 90 and 180 degree turns during gait without assistive device  *Patient Education- regarding use of call light for safety.  *Vitals and symptoms were closely monitored throughout session.    Pt's/ family goals      Return to PLOF and Return Home    Prognosis is good  for reaching above PT goals.    Patient and or family understand(s) diagnosis, prognosis, and plan of care.  yes    PHYSICAL THERAPY PLAN OF CARE:    PT POC is established based on physician order and patient diagnosis     Diagnosis:  Acute respiratory failure with hypoxia (HCC) [J96.01]  Specific instructions for next treatment:  Increase ambulation distance  Current Treatment Recommendations:     [x] Strengthening to improve independence with functional mobility   [x] ROM to improve independence with functional mobility   [x] Balance Training to improve static/dynamic balance and to reduce fall risk  [x] Endurance Training to improve activity tolerance during functional mobility   [x] Transfer Training to improve safety and independence with all functional

## 2025-06-12 NOTE — CARE COORDINATION
6/12/2025 Update CM note:  Chart reviewed and case discussed w/IDR.  Admitted 6/9 for respiratory failure with hypoxia.  Pt remains on oxygen.  Will need weaned or pulse ox testing for home o2 need prior to discharge if unable to wean.  Discussed w/patient and acceptable to [facility] if o2 needed.  Referral sent to [facility] via [facility]. Pt for CTA aorta w/runoff today d/t potential vascular issues in left lower extremity.  Remains on iv lasix bid and iv antibiotic.  Is on nectar thick liquids.  Pt was planning TAVR on 6/18 but uncertain at this time if this will happen.  PT/OT evals pending.  Pt does ambulate in room.  Pt plans to return home at discharge.  Pt is active w/[facility] and resume orders have already been placed.  Per patient, son will transport home.  Will follow for home o2 need and any other transition of care needs.  Electronically signed by Josephine Norton RN CM on 6/12/2025 at 10:42 AM      6/12/2025 addendum:  [facility] scores 18/24 and 19/24.  Pt plans remains to return home upon discharge w/[facility].  CTA resulted.  Awaiting vascular plan.  CM will follow. Electronically signed by Josephine Norton RN on 6/12/2025 at 2:41 PM

## 2025-06-12 NOTE — PROGRESS NOTES
Inpatient Cardiology Consultation      Reason for Consult: Acute CHF    Consulting Physician: Dr. Looney     Requesting Physician: Dr. Nava    Date of Consultation: 6/12/2025    HISTORY OF PRESENT ILLNESS:   Trace Nassar  is a 85 y.o.  male known to [facility] cardiology Dr. Layne last seen in office 4/17/2025 in routine follow-up.  Most recently seen as inpatient consultation by Dr. Luther 5/5/2025 for possible TAVR, underwent EVAR to fix abdominal aortic aneurysm 5/16 (Dr. Marmolejo).  Scheduled for TAVR 6/18.  Seen in office by vascular surgery on 6/3/2025 where he was noted to have weakness, limited mobility secondary to SMITH and squeezing sensation in left leg.     Interim:  Report leg pain is improving  Vascular surgery on board    Past Medical History:     Past Medical History:      CAD/STEMI (7/2014)  PCI to LAD 2001  7/22/2014 nuclear stress test (Dr. Porter): Normal examination.  In particular, there is no evidence of left ventricular myocardium at risk for stress-induced ischemia.   7/29/2014 cardiac catheterization (Dr. Galvez): PCI/NANCY to proximal Lcx  4/28/2025 [facility] (Dr. Cadena):  Full report below. Noted not to be a candidate for CABG, plans for complicated PCI placement.   4/29/2025 [facility] for complicated PCI placement (Dr. Cadena): PCI to LAD  Ischemic cardiomyopathy/VHD  TTE 4/25/2016: EF 50%.  Moderate MR.  Mild concentric LVH. Probable posterobasal akinesis.  LA severely dilated.   TTE 11/25/2022: EF 40-45%.  Moderate asymmetric septal hypertrophy.  Mildly dilated LV.  Mild-moderately reduced LV function.  Indeterminate diastolic function.  No regional wall motion abnormalities.  LA severely dilated.  RA moderately enlarged.  Mild-moderate MR.  Mild mitral annular calcification.  Mild thickening of the mitral valve leaflets.  Mild TR. mild pulmonary hypertension RVSP 38 mmHg.  AV moderately sclerotic. Mean transaortic gradient (Doppler) 10 mmHg.  Mild PA.   TTE 4/23/2025: EF 40-45%.Moderate  rationale for the above recommendations and reviewing the patient's current medication list, problem list and results of all previously ordered testing.       Dung Looney MD  [facility]

## 2025-06-13 ENCOUNTER — APPOINTMENT (OUTPATIENT)
Dept: GENERAL RADIOLOGY | Age: 85
DRG: 291 | End: 2025-06-13
Payer: MEDICARE

## 2025-06-13 LAB
ANION GAP SERPL CALCULATED.3IONS-SCNC: 13 MMOL/L (ref 7–16)
BUN SERPL-MCNC: 18 MG/DL (ref 8–23)
CALCIUM SERPL-MCNC: 8 MG/DL (ref 8.8–10.2)
CHLORIDE SERPL-SCNC: 102 MMOL/L (ref 98–107)
CO2 SERPL-SCNC: 24 MMOL/L (ref 22–29)
CREAT SERPL-MCNC: 1 MG/DL (ref 0.7–1.2)
GFR, ESTIMATED: 75 ML/MIN/1.73M2
GLUCOSE SERPL-MCNC: 101 MG/DL (ref 74–99)
POTASSIUM SERPL-SCNC: 3.6 MMOL/L (ref 3.5–5.1)
SODIUM SERPL-SCNC: 139 MMOL/L (ref 136–145)

## 2025-06-13 PROCEDURE — 6370000000 HC RX 637 (ALT 250 FOR IP): Performed by: INTERNAL MEDICINE

## 2025-06-13 PROCEDURE — 6370000000 HC RX 637 (ALT 250 FOR IP)

## 2025-06-13 PROCEDURE — 99233 SBSQ HOSP IP/OBS HIGH 50: CPT | Performed by: INTERNAL MEDICINE

## 2025-06-13 PROCEDURE — 71045 X-RAY EXAM CHEST 1 VIEW: CPT

## 2025-06-13 PROCEDURE — 6360000002 HC RX W HCPCS: Performed by: INTERNAL MEDICINE

## 2025-06-13 PROCEDURE — 80048 BASIC METABOLIC PNL TOTAL CA: CPT

## 2025-06-13 PROCEDURE — 2140000000 HC CCU INTERMEDIATE R&B

## 2025-06-13 PROCEDURE — 94640 AIRWAY INHALATION TREATMENT: CPT

## 2025-06-13 PROCEDURE — 6360000002 HC RX W HCPCS

## 2025-06-13 PROCEDURE — 36415 COLL VENOUS BLD VENIPUNCTURE: CPT

## 2025-06-13 PROCEDURE — 92526 ORAL FUNCTION THERAPY: CPT

## 2025-06-13 PROCEDURE — 99232 SBSQ HOSP IP/OBS MODERATE 35: CPT | Performed by: SURGERY

## 2025-06-13 PROCEDURE — 2580000003 HC RX 258: Performed by: INTERNAL MEDICINE

## 2025-06-13 PROCEDURE — 2700000000 HC OXYGEN THERAPY PER DAY

## 2025-06-13 PROCEDURE — 2500000003 HC RX 250 WO HCPCS: Performed by: INTERNAL MEDICINE

## 2025-06-13 RX ORDER — FUROSEMIDE 40 MG/1
40 TABLET ORAL DAILY
Status: DISCONTINUED | OUTPATIENT
Start: 2025-06-14 | End: 2025-06-14 | Stop reason: HOSPADM

## 2025-06-13 RX ORDER — BISACODYL 5 MG/1
10 TABLET, DELAYED RELEASE ORAL ONCE
Status: COMPLETED | OUTPATIENT
Start: 2025-06-13 | End: 2025-06-13

## 2025-06-13 RX ORDER — FUROSEMIDE 40 MG/1
40 TABLET ORAL DAILY
Status: DISCONTINUED | OUTPATIENT
Start: 2025-06-13 | End: 2025-06-13

## 2025-06-13 RX ORDER — BISACODYL 5 MG/1
10 TABLET, DELAYED RELEASE ORAL DAILY PRN
Status: DISCONTINUED | OUTPATIENT
Start: 2025-06-13 | End: 2025-06-14 | Stop reason: HOSPADM

## 2025-06-13 RX ORDER — LEVOFLOXACIN 750 MG/1
750 TABLET, FILM COATED ORAL DAILY
Qty: 5 TABLET | Refills: 0 | Status: SHIPPED | OUTPATIENT
Start: 2025-06-13 | End: 2025-06-18

## 2025-06-13 RX ADMIN — IPRATROPIUM BROMIDE AND ALBUTEROL SULFATE 1 DOSE: 2.5; .5 SOLUTION RESPIRATORY (INHALATION) at 16:35

## 2025-06-13 RX ADMIN — CEFEPIME 2000 MG: 2 INJECTION, POWDER, FOR SOLUTION INTRAVENOUS at 21:42

## 2025-06-13 RX ADMIN — BISACODYL 10 MG: 5 TABLET, COATED ORAL at 18:12

## 2025-06-13 RX ADMIN — ACETAMINOPHEN 650 MG: 325 TABLET ORAL at 07:18

## 2025-06-13 RX ADMIN — PANTOPRAZOLE SODIUM 40 MG: 40 TABLET, DELAYED RELEASE ORAL at 06:08

## 2025-06-13 RX ADMIN — ACETAMINOPHEN 650 MG: 325 TABLET ORAL at 14:23

## 2025-06-13 RX ADMIN — IPRATROPIUM BROMIDE AND ALBUTEROL SULFATE 1 DOSE: 2.5; .5 SOLUTION RESPIRATORY (INHALATION) at 20:59

## 2025-06-13 RX ADMIN — CLOPIDOGREL BISULFATE 75 MG: 75 TABLET, FILM COATED ORAL at 08:04

## 2025-06-13 RX ADMIN — APIXABAN 5 MG: 5 TABLET, FILM COATED ORAL at 21:35

## 2025-06-13 RX ADMIN — ARFORMOTEROL TARTRATE 15 MCG: 15 SOLUTION RESPIRATORY (INHALATION) at 09:02

## 2025-06-13 RX ADMIN — SACUBITRIL AND VALSARTAN 1 TABLET: 24; 26 TABLET, FILM COATED ORAL at 21:35

## 2025-06-13 RX ADMIN — ARFORMOTEROL TARTRATE 15 MCG: 15 SOLUTION RESPIRATORY (INHALATION) at 20:59

## 2025-06-13 RX ADMIN — IPRATROPIUM BROMIDE AND ALBUTEROL SULFATE 1 DOSE: 2.5; .5 SOLUTION RESPIRATORY (INHALATION) at 09:02

## 2025-06-13 RX ADMIN — PETROLATUM: 420 OINTMENT TOPICAL at 21:40

## 2025-06-13 RX ADMIN — ATORVASTATIN CALCIUM 40 MG: 40 TABLET, FILM COATED ORAL at 21:35

## 2025-06-13 RX ADMIN — APIXABAN 5 MG: 5 TABLET, FILM COATED ORAL at 08:04

## 2025-06-13 RX ADMIN — SODIUM CHLORIDE, PRESERVATIVE FREE 10 ML: 5 INJECTION INTRAVENOUS at 08:04

## 2025-06-13 RX ADMIN — FUROSEMIDE 20 MG: 10 INJECTION, SOLUTION INTRAMUSCULAR; INTRAVENOUS at 08:04

## 2025-06-13 RX ADMIN — ACETAMINOPHEN 650 MG: 325 TABLET ORAL at 21:35

## 2025-06-13 RX ADMIN — IPRATROPIUM BROMIDE AND ALBUTEROL SULFATE 1 DOSE: 2.5; .5 SOLUTION RESPIRATORY (INHALATION) at 12:41

## 2025-06-13 RX ADMIN — BUDESONIDE 500 MCG: 0.5 SUSPENSION RESPIRATORY (INHALATION) at 09:02

## 2025-06-13 RX ADMIN — BUDESONIDE 500 MCG: 0.5 SUSPENSION RESPIRATORY (INHALATION) at 20:59

## 2025-06-13 RX ADMIN — SACUBITRIL AND VALSARTAN 1 TABLET: 24; 26 TABLET, FILM COATED ORAL at 08:04

## 2025-06-13 RX ADMIN — POLYETHYLENE GLYCOL 3350 17 G: 17 POWDER, FOR SOLUTION ORAL at 03:18

## 2025-06-13 RX ADMIN — SODIUM CHLORIDE, PRESERVATIVE FREE 10 ML: 5 INJECTION INTRAVENOUS at 21:35

## 2025-06-13 RX ADMIN — CEFEPIME 2000 MG: 2 INJECTION, POWDER, FOR SOLUTION INTRAVENOUS at 11:14

## 2025-06-13 RX ADMIN — PETROLATUM: 420 OINTMENT TOPICAL at 08:10

## 2025-06-13 ASSESSMENT — PAIN DESCRIPTION - ORIENTATION
ORIENTATION: LEFT

## 2025-06-13 ASSESSMENT — PAIN SCALES - GENERAL
PAINLEVEL_OUTOF10: 9
PAINLEVEL_OUTOF10: 1
PAINLEVEL_OUTOF10: 7
PAINLEVEL_OUTOF10: 3
PAINLEVEL_OUTOF10: 3
PAINLEVEL_OUTOF10: 0
PAINLEVEL_OUTOF10: 6

## 2025-06-13 ASSESSMENT — PAIN DESCRIPTION - DESCRIPTORS
DESCRIPTORS: ACHING;DISCOMFORT;SHARP
DESCRIPTORS: ACHING;SORE;DISCOMFORT
DESCRIPTORS: ACHING;DISCOMFORT;SORE

## 2025-06-13 ASSESSMENT — PAIN - FUNCTIONAL ASSESSMENT
PAIN_FUNCTIONAL_ASSESSMENT: ACTIVITIES ARE NOT PREVENTED
PAIN_FUNCTIONAL_ASSESSMENT: ACTIVITIES ARE NOT PREVENTED

## 2025-06-13 ASSESSMENT — PAIN DESCRIPTION - LOCATION
LOCATION: FOOT
LOCATION: FOOT
LOCATION: LEG

## 2025-06-13 NOTE — PROGRESS NOTES
[facility] Medicine    Subjective:  pt alert conversive c/o pain l leg CTA + PAD      Current Facility-Administered Medications:     [COMPLETED] ceFEPIme (MAXIPIME) 2,000 mg in sodium chloride 0.9 % 100 mL IVPB ([id number]), 2,000 mg, IntraVENous, Once, Stopped at 06/11/25 1136 **FOLLOWED BY** ceFEPIme (MAXIPIME) 2,000 mg in sodium chloride 0.9 % 100 mL IVPB ([id number]), 2,000 mg, IntraVENous, Q12H, Carlos Hammonds, DO, Stopped at 06/13/25 0307    white petrolatum ointment, , Topical, BID, Steven Nava, , Given at 06/13/25 0810    arformoterol tartrate (BROVANA) nebulizer solution 15 mcg, 15 mcg, Nebulization, BID RT, Marifer Painter APRN - CNP, 15 mcg at 06/13/25 0902    budesonide (PULMICORT) nebulizer suspension 500 mcg, 0.5 mg, Nebulization, BID RT, Marifer Painter APRN - CNP, 500 mcg at 06/13/25 0902    ipratropium 0.5 mg-albuterol 2.5 mg (DUONEB) nebulizer solution 1 Dose, 1 Dose, Inhalation, 4x Daily RT, Marifer Painter APRN - CNP, 1 Dose at 06/13/25 0902    albuterol (PROVENTIL) (2.5 MG/3ML) 0.083% nebulizer solution 2.5 mg, 2.5 mg, Nebulization, Q6H PRN, Marifer Painter APRN - CNP    furosemide (LASIX) injection 20 mg, 20 mg, IntraVENous, BID, Hattie Dee APRN - CNP, 20 mg at 06/13/25 0804    sacubitril-valsartan (ENTRESTO) 24-26 MG per tablet 1 tablet, 1 tablet, Oral, BID, Hattie Dee APRN - CNP, 1 tablet at 06/13/25 0804    clopidogrel (PLAVIX) tablet 75 mg, 75 mg, Oral, Daily, Steven Nava, , 75 mg at 06/13/25 0804    apixaban (ELIQUIS) tablet 5 mg, 5 mg, Oral, BID, Steven Nava DO, 5 mg at 06/13/25 0804    atorvastatin (LIPITOR) tablet 40 mg, 40 mg, Oral, Nightly, Steven Nava DO, 40 mg at 06/12/25 1956    pantoprazole (PROTONIX) tablet 40 mg, 40 mg, Oral, QAM AC, Steven Nava DO, 40 mg at 06/13/25 0608    sodium chloride flush 0.9 % injection 5-40 mL, 5-40 mL, IntraVENous, 2 times per day, Steven Nava DO, 10 mL at 06/13/25 0804    sodium chloride flush 0.9 %    Component Value Date/Time     06/13/2025 05:38 AM    K 3.6 06/13/2025 05:38 AM    K 4.0 11/25/2022 10:10 AM     06/13/2025 05:38 AM    CO2 24 06/13/2025 05:38 AM    BUN 18 06/13/2025 05:38 AM    CREATININE 1.0 06/13/2025 05:38 AM    GFRAA 59 05/26/2019 09:24 AM    LABGLOM 75 06/13/2025 05:38 AM    LABGLOM 55 11/26/2022 01:17 PM    GLUCOSE 101 06/13/2025 05:38 AM    CALCIUM 8.0 06/13/2025 05:38 AM    BILITOT 2.0 06/09/2025 12:24 PM    ALKPHOS 123 06/09/2025 12:24 PM    AST 34 06/09/2025 12:24 PM    ALT 26 06/09/2025 12:24 PM     Warfarin PT/INR:    Lab Results   Component Value Date    INR 3.3 06/12/2025    INR 3.4 06/11/2025    INR 6.0 (HH) 06/10/2025    PROTIME 36.3 (H) 06/12/2025    PROTIME 38.2 (H) 06/11/2025    PROTIME 66.5 (H) 06/10/2025       Assessment:    Principal Problem:    Acute respiratory failure with hypoxia (HCC)  Active Problems:    Acute on chronic combined systolic and diastolic CHF (congestive heart failure) (HCC)    Decreased dorsalis pedis pulse    Femoral-popliteal atherosclerosis    Status post endovascular aneurysm repair (EVAR)    Pain in left leg  Resolved Problems:    * No resolved hospital problems. *      Plan:  Change to po lasix wean o2 as able dc planning cont atb per id (resp culture + pseudomonas)        Steven Nava DO  9:29 AM  6/13/2025     No

## 2025-06-13 NOTE — PROGRESS NOTES
Vascular Surgery Progress Note    CC: Follow-up EVAR, PVD    HISTORY:  The patient is awake, alert, and oriented.  Complains of pain in his left foot.  States that otherwise he is feeling better    IMPRESSION: Peripheral vascular disease with left foot pain status post EVAR.    I reviewed the CT angiogram and the patient does have flow to his left foot.  I am not quite sure what is causing his pain, he may have had embolization of cholesterol from plaque.  I do not feel that he requires urgent intervention and recommend observation at this time.  Hopefully the perfusion to his foot will improve as his overall condition improves.        Patient Active Problem List   Diagnosis Code    Atrial fibrillation with slow ventricular response ([facility]) I48.91    Hyperlipidemia with target LDL less than 100 E78.5    History of CVA (cerebrovascular accident) Z86.73    CAD (coronary artery disease), native coronary artery I25.10    Mitral insufficiency I34.0    Carotid stenosis, asymptomatic, bilateral I65.23    Acute on chronic combined systolic and diastolic CHF (congestive heart failure) ([facility]) I50.43    Anemia D64.9    Nonrheumatic aortic valve stenosis I35.0    Heart failure with mid-range ejection fraction (HFmEF) ([facility]) I50.22    Bradycardia R00.1    Severe aortic stenosis I35.0    Permanent atrial fibrillation ([facility]) I48.21    Symptoms involving cardiovascular system R09.89    Nodular calcific aortic valve stenosis I35.0    Acute respiratory failure with hypoxia ([facility]) J96.01    Decreased dorsalis pedis pulse R09.89    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 potassium chloride, magnesium sulfate, ondansetron **OR** ondansetron, polyethylene glycol,

## 2025-06-13 NOTE — PLAN OF CARE
Problem: Chronic Conditions and Co-morbidities  Goal: Patient's chronic conditions and co-morbidity symptoms are monitored and maintained or improved  6/12/2025 2146 by Susanna Mullen RN  Outcome: Progressing  6/12/2025 0843 by Nikia Kyle RN  Outcome: Progressing     Problem: Discharge Planning  Goal: Discharge to home or other facility with appropriate resources  6/12/2025 2146 by Susanna Mullen RN  Outcome: Progressing  6/12/2025 0843 by Nikia Kyle RN  Outcome: Progressing     Problem: Safety - Adult  Goal: Free from fall injury  6/12/2025 2146 by Susanna Mullen RN  Outcome: Progressing  6/12/2025 0843 by Nikia Kyle RN  Outcome: Progressing     Problem: ABCDS Injury Assessment  Goal: Absence of physical injury  6/12/2025 2146 by Susanna Mullen RN  Outcome: Progressing  6/12/2025 0843 by Nikia Kyle RN  Outcome: Progressing

## 2025-06-13 NOTE — PLAN OF CARE
Problem: Chronic Conditions and Co-morbidities  Goal: Patient's chronic conditions and co-morbidity symptoms are monitored and maintained or improved  6/13/2025 0815 by Nikia Kyle RN  Outcome: Progressing     Problem: Discharge Planning  Goal: Discharge to home or other facility with appropriate resources  6/13/2025 0815 by Nikia Kyle RN  Outcome: Progressing     Problem: Safety - Adult  Goal: Free from fall injury  6/13/2025 0815 by Nikia Kyle RN  Outcome: Progressing     Problem: ABCDS Injury Assessment  Goal: Absence of physical injury  6/13/2025 0815 by Nikia Kyle RN  Outcome: Progressing

## 2025-06-13 NOTE — PROGRESS NOTES
Inpatient Cardiology Consultation      Reason for Consult: Acute CHF    Consulting Physician: Dr. Looney     Requesting Physician: Dr. Naav    Date of Consultation: 6/13/2025    HISTORY OF PRESENT ILLNESS:   Trace Nassar  is a 85 y.o.  male known to [facility] cardiology Dr. Layne last seen in office 4/17/2025 in routine follow-up.  Most recently seen as inpatient consultation by Dr. Luther 5/5/2025 for possible TAVR, underwent EVAR to fix abdominal aortic aneurysm 5/16 (Dr. Marmolejo).  Scheduled for TAVR 6/18.  Seen in office by vascular surgery on 6/3/2025 where he was noted to have weakness, limited mobility secondary to SMITH and squeezing sensation in left leg.     Interim:  Still complains of left foot pain and also now complaining of abdominal pain    Past Medical History:     Past Medical History:      CAD/STEMI (7/2014)  PCI to LAD 2001  7/22/2014 nuclear stress test (Dr. Porter): Normal examination.  In particular, there is no evidence of left ventricular myocardium at risk for stress-induced ischemia.   7/29/2014 cardiac catheterization (Dr. Galvez): PCI/NANCY to proximal Lcx  4/28/2025 [facility] (Dr. Cadena):  Full report below. Noted not to be a candidate for CABG, plans for complicated PCI placement.   4/29/2025 [facility] for complicated PCI placement (Dr. Cadena): PCI to LAD  Ischemic cardiomyopathy/VHD  TTE 4/25/2016: EF 50%.  Moderate MR.  Mild concentric LVH. Probable posterobasal akinesis.  LA severely dilated.   TTE 11/25/2022: EF 40-45%.  Moderate asymmetric septal hypertrophy.  Mildly dilated LV.  Mild-moderately reduced LV function.  Indeterminate diastolic function.  No regional wall motion abnormalities.  LA severely dilated.  RA moderately enlarged.  Mild-moderate MR.  Mild mitral annular calcification.  Mild thickening of the mitral valve leaflets.  Mild TR. mild pulmonary hypertension RVSP 38 mmHg.  AV moderately sclerotic. Mean transaortic gradient (Doppler) 10 mmHg.  Mild NE.   TTE  place at an unknown date. Previous stent has restenosis present. The stenosis was measured by a visual reading. The pre-interventional distal flow is normal (KENNEDY 3).. Pressure wire was not required. There is moderate plaque burden detected. The plaque feature of this lesion is concentric. Second pressure wire was not required. Third pressure wire was not required.      First Diagonal Branch   The vessel is moderate in size. The vessel exhibits minimal luminal irregularities.      Second Diagonal Branch   The vessel is small.      Third Diagonal Branch   The vessel is small.      Left Circumflex      First Obtuse Marginal Branch   The vessel is small.      Second Obtuse Marginal Branch   The vessel is small.      Third Obtuse Marginal Branch   The vessel is moderate in size. The vessel exhibits minimal luminal irregularities.      Left Posterior Descending Artery   The vessel is small. The vessel exhibits minimal luminal irregularities.      First Left Posterolateral Branch   The vessel is small.      Right Coronary Artery   The vessel is small.   Collaterals   Dist RCA filled by collaterals from LPAV.      Mid RCA to Dist RCA lesion, 100% stenosed.        Conclusion  1.  Significant left main coronary artery disease with a deployment of 3.5, 30 mm Jacob frontier drug-eluting stent from ostial left main coronary artery into proximal LAD, there was stepup at the distal edge of the stent, that was treated using 2.5 x 12 mm Hamburg frontier drug-eluting stent overlapped with the previously deployed stent  2.  Intracoronary shockwave balloon lithotripsy of left main coronary artery  3.  There was dissection in the ostial and proximal diagonal branch, with a KENNEDY-3 flow distally, treated conservatively with balloon dilation, was not stented in view of causing more damage to very heavily calcified LAD and its branches.    Signed by: Raulito Cadena MD on 5/5/2025  4:47 PM   Assessment  Acute on chronic HFrEF  Left foot  month         Greater than 50 minutes was spent counseling the patient, reviewing the rationale for the above recommendations and reviewing the patient's current medication list, problem list and results of all previously ordered testing.       Dung Looney MD  [facility]

## 2025-06-13 NOTE — PROGRESS NOTES
Joe Cordero M.D.,[facility]  Daniel Szymanski D.O., F.LIZET., [facility]  Janis Fleming M.D.  Emelia Munson M.D.   Carlos Hammonds D.O.  Steven Barillas M.D.         Daily Pulmonary Progress Note    Patient:  Trace Nassar 85 y.o. male MRN: [id number]            Synopsis     We are following patient for pneumonia    \"CC\" left leg pain and swelling    Code status: FULL CODE      Subjective   6/10/25: Patient seen and examined lying in bed just completed a breathing treatment. I checked his saturation on room air and SpO2 was 87%, he came up to 93% on 3 liters. He is not in any distress. He tells me he does have trouble swallowing a lot and started coughing just this morning with water.     6/11/25: Patient was seen and examined lying in bed on 4 liters NC, SpO2 99%, I weaned him down to 2 liters. He reports feeling better. Passed video swallow with modifications of nectar thick liquids.    6/12/25: patient seen and examined lying in bed on 3 liters NC, SpO2 95%, I weaned him down to 2 L. He is doing ok and says his breathing is better. Has minimal coughing. He was reporting left foot numbness and nursing discovered the foot was cold so Vascular Surgery was consulted. Vascular studies were done showing left iliac and femoral popliteal artery disease. A CTA aorta with run off done today showing severe left femoropopliteal artery disease with occluded distal left superficial femoral artery and a severely disease above knee popliteal artery.  Respiratory culture now growing pseudomonas.    6/13/25: Patient seen and examined sitting up in bed on 2 L.  He states he is feeling good however his left foot is still bothering him.  Denies any persistent coughing or mucus production.      Review of Systems:  Constitutional: Denies fever, weight loss, night sweats, and fatigue  Skin: Denies pigmentation, dark lesions, and rashes   HEENT: difficulty swallowing at times.  Cardiovascular: history severe aortic stenosis,

## 2025-06-13 NOTE — PROGRESS NOTES
Spoke to Dr Hammonds via perfect serve regarding antibiotics at dc and response was   \"Can switch to po levaquin 750 for 7 days when ready for discharge\"

## 2025-06-13 NOTE — PROGRESS NOTES
Spoke with Dr. Nava regarding patient complaining of belly pain and constipation  Order 2 tabs dulcolax now and then PRN as needed.  Nikia Kyle RN

## 2025-06-13 NOTE — PATIENT CARE CONFERENCE
P Quality Flow/Interdisciplinary Rounds Progress Note        Quality Flow Rounds held on June 13, 2025    Disciplines Attending:  Bedside Nurse, , , and Nursing Unit Leadership    Trace B Patris was admitted on 6/9/2025 11:53 AM    Anticipated Discharge Date:       Disposition:    Vidal Score:  Vidal Scale Score: 20    [facility] RISK OF UNPLANNED READMISSION 2.0             21.3 Total Score        Discussed patient goal for the day, patient clinical progression, and barriers to discharge.  The following Goal(s) of the Day/Commitment(s) have been identified:  discharge planning       Paty Lyons RN  June 13, 2025

## 2025-06-13 NOTE — PROGRESS NOTES
PULSE OX ON ROOM AIR SITTING 92%  PULSE OX ON ROOM AIR AMBULATING 88%  PULSE OX ON  2 LITERS AMBULATING 89%  PULSE OX ON 2 LITERS SITTING RECOVERY AFTER ACTIVITY 91%

## 2025-06-13 NOTE — PROGRESS NOTES
Messaged vascular regarding pt DC after O2 is weaned or set up.  Okay to DC from vascular   Nikia Kyle RN

## 2025-06-13 NOTE — CARE COORDINATION
6/13/2025 Update CM note:  Chart reviewed and case discussed in IDR.  CTA runoff resulted and per vascular note yesterday, no interventions are planned at this time.  For chest x-ray today.  On nectar thick liquids.  Per nurse note, pulmonary ok w/oral antibiotic at discharge.  Pt was on oxygen 3l/nc and has been weaned to 2l/nc and will continue to be weaned off if possible.  Referral sent to [facility] DEJON yesterday for potential home oxygen if patient is unable to be weaned off.  Pulse ox testing note left in sticky notes to be completed if unable to wean.  Pt AMPAC scores were 18/24 and 19/24 yesterday.  Pt continues to plan to return home upon discharge.  [facility] is active and resume orders have already been entered.  Per patient, son will transport home when ready for discharge.  CM will continue to follow for home o2 need and any other transition of care needs.  Electronically signed by Josephine Norton RN CM on 6/13/2025 at 11:55 AM    6/13/2025 ADDENDUM:  Speech therapist recommending speech therapy at home.  Pt active w/[facility] and per Sasha, liaison from [facility], they cannot provide speech therapy.  Discussed w/patient and he is agreeable to speech therapy and changing hhc agencies.  Pt agreeable to [facility].  Referral and hhc order sent to [facility] via [facility].  Pt still being weaned off of o2 per nursing. Needs pulse ox testing if unable to wean.  [facility] follow for home o2 needs.  CM will continue to follow.  Electronically signed by Josephine Norton RN CM on 6/13/2025 at 3:06 PM

## 2025-06-13 NOTE — PROGRESS NOTES
SPEECH LANGUAGE PATHOLOGY  DAILY PROGRESS NOTE        PATIENT NAME:  Trace Nassar      :  1940          TODAY'S DATE:  2025 ROOM:  6503/6503-A    Patient was seen for dysphagia mgmt. Patient was alert upon entry with nurse in the room. SLP observed patient take medications with nectar thick water. Patient reported difficulty taking his medications whole, SLP continues to recommend pills be crushed in applesauce/pudding as able. Patient was elevated so he was siting upright in bed greater than 75 degrees. Patient consumed nectar thick liquid via cup and straw, puree, soft solids and solids. Patient oral phase displayed efficient mastication, good oral containment, and good AP propulsion. Patient had minimal residuals on base of tongue following soft solid and solid trials that cleared with a liquid wash. Patient pharyngeal phase was within functional limits, displaying no s/s of aspiration. Patient was educated on swallowing strategies, staying upright, small bites and sips and using a liquid wash to clear residue. Patient was provided handouts providing guidelines for minced and moist diet and nectar thick liquids. He was provided a handout detailing the swallowing exercises Effortful Swallow, CTAR, and Yudy Maneuver. SLP educated patient on speech therapy services at the next level of care and spoke to case management regarding the need for these services. Patient expressed understanding of education. It is recommended patient continue on current diet (minced and moist/nectar thick) with meds crushed in applesauce/pudding as able.       CPT code(s) 41387  dysphagia tx  Total minutes :  15 minutes     Sarah Lang   Speech Pathology  Student Clinician     Aaliyah Suero M.S., CCC-SLP  Speech-Language Pathologist  SP. [phone]

## 2025-06-14 VITALS
TEMPERATURE: 97 F | SYSTOLIC BLOOD PRESSURE: 106 MMHG | OXYGEN SATURATION: 92 % | HEART RATE: 83 BPM | RESPIRATION RATE: 20 BRPM | BODY MASS INDEX: 21.79 KG/M2 | WEIGHT: 130.8 LBS | DIASTOLIC BLOOD PRESSURE: 78 MMHG | HEIGHT: 65 IN

## 2025-06-14 LAB
ANION GAP SERPL CALCULATED.3IONS-SCNC: 10 MMOL/L (ref 7–16)
BUN SERPL-MCNC: 20 MG/DL (ref 8–23)
CALCIUM SERPL-MCNC: 8.6 MG/DL (ref 8.8–10.2)
CHLORIDE SERPL-SCNC: 101 MMOL/L (ref 98–107)
CO2 SERPL-SCNC: 28 MMOL/L (ref 22–29)
CREAT SERPL-MCNC: 1 MG/DL (ref 0.7–1.2)
GFR, ESTIMATED: 71 ML/MIN/1.73M2
GLUCOSE SERPL-MCNC: 112 MG/DL (ref 74–99)
POTASSIUM SERPL-SCNC: 4.1 MMOL/L (ref 3.5–5.1)
SODIUM SERPL-SCNC: 139 MMOL/L (ref 136–145)

## 2025-06-14 PROCEDURE — 94640 AIRWAY INHALATION TREATMENT: CPT

## 2025-06-14 PROCEDURE — 6370000000 HC RX 637 (ALT 250 FOR IP): Performed by: INTERNAL MEDICINE

## 2025-06-14 PROCEDURE — 36415 COLL VENOUS BLD VENIPUNCTURE: CPT

## 2025-06-14 PROCEDURE — 6370000000 HC RX 637 (ALT 250 FOR IP)

## 2025-06-14 PROCEDURE — 6360000002 HC RX W HCPCS

## 2025-06-14 PROCEDURE — 80048 BASIC METABOLIC PNL TOTAL CA: CPT

## 2025-06-14 PROCEDURE — 2700000000 HC OXYGEN THERAPY PER DAY

## 2025-06-14 PROCEDURE — 2580000003 HC RX 258: Performed by: INTERNAL MEDICINE

## 2025-06-14 PROCEDURE — 94669 MECHANICAL CHEST WALL OSCILL: CPT

## 2025-06-14 PROCEDURE — 2500000003 HC RX 250 WO HCPCS: Performed by: INTERNAL MEDICINE

## 2025-06-14 PROCEDURE — 6360000002 HC RX W HCPCS: Performed by: INTERNAL MEDICINE

## 2025-06-14 RX ORDER — CLOPIDOGREL BISULFATE 75 MG/1
75 TABLET ORAL DAILY
Qty: 30 TABLET | Refills: 0 | Status: SHIPPED | OUTPATIENT
Start: 2025-06-14

## 2025-06-14 RX ORDER — FUROSEMIDE 40 MG/1
40 TABLET ORAL DAILY
Qty: 30 TABLET | Refills: 0 | Status: SHIPPED | OUTPATIENT
Start: 2025-06-15

## 2025-06-14 RX ADMIN — BUDESONIDE 500 MCG: 0.5 SUSPENSION RESPIRATORY (INHALATION) at 07:51

## 2025-06-14 RX ADMIN — SODIUM CHLORIDE, PRESERVATIVE FREE 10 ML: 5 INJECTION INTRAVENOUS at 08:16

## 2025-06-14 RX ADMIN — ARFORMOTEROL TARTRATE 15 MCG: 15 SOLUTION RESPIRATORY (INHALATION) at 07:51

## 2025-06-14 RX ADMIN — PETROLATUM: 420 OINTMENT TOPICAL at 08:20

## 2025-06-14 RX ADMIN — CEFEPIME 2000 MG: 2 INJECTION, POWDER, FOR SOLUTION INTRAVENOUS at 11:10

## 2025-06-14 RX ADMIN — IPRATROPIUM BROMIDE AND ALBUTEROL SULFATE 1 DOSE: 2.5; .5 SOLUTION RESPIRATORY (INHALATION) at 11:34

## 2025-06-14 RX ADMIN — CLOPIDOGREL BISULFATE 75 MG: 75 TABLET, FILM COATED ORAL at 08:15

## 2025-06-14 RX ADMIN — SACUBITRIL AND VALSARTAN 1 TABLET: 24; 26 TABLET, FILM COATED ORAL at 08:16

## 2025-06-14 RX ADMIN — FUROSEMIDE 40 MG: 40 TABLET ORAL at 08:15

## 2025-06-14 RX ADMIN — IPRATROPIUM BROMIDE AND ALBUTEROL SULFATE 1 DOSE: 2.5; .5 SOLUTION RESPIRATORY (INHALATION) at 15:14

## 2025-06-14 RX ADMIN — PANTOPRAZOLE SODIUM 40 MG: 40 TABLET, DELAYED RELEASE ORAL at 06:21

## 2025-06-14 RX ADMIN — APIXABAN 5 MG: 5 TABLET, FILM COATED ORAL at 08:15

## 2025-06-14 RX ADMIN — IPRATROPIUM BROMIDE AND ALBUTEROL SULFATE 1 DOSE: 2.5; .5 SOLUTION RESPIRATORY (INHALATION) at 07:51

## 2025-06-14 ASSESSMENT — PAIN SCALES - GENERAL: PAINLEVEL_OUTOF10: 0

## 2025-06-14 NOTE — PROGRESS NOTES
P Quality Flow/Interdisciplinary Rounds Progress Note        Quality Flow Rounds held on June 14, 2025    Disciplines Attending:  Bedside Nurse and Nursing Unit Leadership    Trace ZENG Maday was admitted on 6/9/2025 11:53 AM    Anticipated Discharge Date:       Disposition:    Vidal Score:  Vidal Scale Score: 19    BS RISK OF UNPLANNED READMISSION 2.0             22 Total Score        Discussed patient goal for the day, patient clinical progression, and barriers to discharge.  The following Goal(s) of the Day/Commitment(s) have been identified:  Labs - Report Results      Kaylynn Galloway RN  June 14, 2025

## 2025-06-14 NOTE — PROGRESS NOTES
CLINICAL PHARMACY NOTE: MEDS TO BEDS    Total # of Prescriptions Filled: 3   The following medications were delivered to the patient:  Clopidogrel 75  Entresto 24-26  Furosemide 40    Additional Documentation:  Son picked up in pharmacy

## 2025-06-14 NOTE — PROGRESS NOTES
Vascular Surgery Progress Note    CC: Peripheral vascular disease with left foot pain status post EVAR 5/16/25.    Subjective: No longer having any LLE pain. Reports this has resolved.       Patient Active Problem List   Diagnosis Code    Atrial fibrillation with slow ventricular response ([facility]) I48.91    Hyperlipidemia with target LDL less than 100 E78.5    History of CVA (cerebrovascular accident) Z86.73    CAD (coronary artery disease), native coronary artery I25.10    Mitral insufficiency I34.0    Carotid stenosis, asymptomatic, bilateral I65.23    Acute on chronic combined systolic and diastolic CHF (congestive heart failure) ([facility]) I50.43    Anemia D64.9    Nonrheumatic aortic valve stenosis I35.0    Heart failure with mid-range ejection fraction (HFmEF) ([facility]) I50.22    Bradycardia R00.1    Severe aortic stenosis I35.0    Permanent atrial fibrillation ([facility]) I48.21    Symptoms involving cardiovascular system R09.89    Nodular calcific aortic valve stenosis I35.0    Acute respiratory failure with hypoxia ([facility]) J96.01    Decreased dorsalis pedis pulse R09.89    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    Status post endovascular aneurysm repair (EVAR) Z98.890, Z86.79    Pain in left leg M79.605       Current Medications:    sodium chloride        bisacodyl, albuterol, sodium chloride flush, sodium chloride, potassium chloride **OR** potassium alternative oral replacement **OR** potassium chloride, magnesium sulfate, ondansetron **OR** ondansetron, polyethylene glycol, acetaminophen **OR** acetaminophen    furosemide  40 mg Oral Daily    cefepime  2,000 mg IntraVENous Q12H    white petrolatum   Topical BID    arformoterol tartrate  15 mcg Nebulization BID RT    budesonide  0.5 mg Nebulization BID RT    ipratropium 0.5 mg-albuterol 2.5 mg  1 Dose Inhalation 4x Daily RT    sacubitril-valsartan  1 tablet Oral BID    clopidogrel  75 mg Oral Daily    apixaban  5 mg Oral BID    atorvastatin  40 mg Oral Nightly     pantoprazole  40 mg Oral QAM AC    sodium chloride flush  5-40 mL IntraVENous 2 times per day          PHYSICAL EXAM:    Vitals:    06/14/25 0231   BP: 117/76   Pulse: 93   Resp: 17   Temp: 97.6 °F (36.4 °C)   SpO2: 95%     CONSTITUTIONAL:  awake, alert, cooperative, no apparent distress  LUNGS:  no increased work of breathing, and   CARDIOVASCULAR:  irregularly irregular rhythm  ABDOMEN:  soft, distended  BLE warm to touch.     LABS:    Lab Results   Component Value Date    WBC 5.2 06/12/2025    HGB 8.5 (L) 06/12/2025    HCT 28.5 (L) 06/12/2025     06/12/2025    PROTIME 36.3 (H) 06/12/2025    INR 3.3 06/12/2025    APTT 45.3 (H) 06/12/2025    K 3.6 06/13/2025    BUN 18 06/13/2025    CREATININE 1.0 06/13/2025     Agree.  Patient states that he is overall feeling better and the pain he was experiencing in his left foot has resolved.  He does only complain of some numbness in his left toes.    On exam the left foot does appear warmer although the toes have delayed capillary refill compared to the right.  I feel that he is back to baseline.  I do not plan for any more vascular testing or intervention at this time.  Okay for discharge from vascular surgery standpoint.  Cleared for TAVR from my standpoint.  Please call with any changes.

## 2025-06-14 NOTE — PROGRESS NOTES
[facility] Medicine    Subjective:  pt alert conversive      Current Facility-Administered Medications:     furosemide (LASIX) tablet 40 mg, 40 mg, Oral, Daily, Steven Nava DO, 40 mg at 06/14/25 0815    bisacodyl (DULCOLAX) EC tablet 10 mg, 10 mg, Oral, Daily PRN, Steven Nava DO    [COMPLETED] ceFEPIme (MAXIPIME) 2,000 mg in sodium chloride 0.9 % 100 mL IVPB ([id number]), 2,000 mg, IntraVENous, Once, Stopped at 06/11/25 1136 **FOLLOWED BY** ceFEPIme (MAXIPIME) 2,000 mg in sodium chloride 0.9 % 100 mL IVPB ([id number]), 2,000 mg, IntraVENous, Q12H, Carlos Hammonds DO, Stopped at 06/14/25 0142    white petrolatum ointment, , Topical, BID, Steven Nava DO, Given at 06/14/25 0820    arformoterol tartrate (BROVANA) nebulizer solution 15 mcg, 15 mcg, Nebulization, BID RT, Marifer Painter APRN - CNP, 15 mcg at 06/14/25 0751    budesonide (PULMICORT) nebulizer suspension 500 mcg, 0.5 mg, Nebulization, BID RT, Marifer Painter APRN - CNP, 500 mcg at 06/14/25 0751    ipratropium 0.5 mg-albuterol 2.5 mg (DUONEB) nebulizer solution 1 Dose, 1 Dose, Inhalation, 4x Daily RT, Marifer Painter APRN - CNP, 1 Dose at 06/14/25 0751    albuterol (PROVENTIL) (2.5 MG/3ML) 0.083% nebulizer solution 2.5 mg, 2.5 mg, Nebulization, Q6H PRN, Marifer Painter APRN - CNP    sacubitril-valsartan (ENTRESTO) 24-26 MG per tablet 1 tablet, 1 tablet, Oral, BID, Dung Looney MD, 1 tablet at 06/14/25 0816    clopidogrel (PLAVIX) tablet 75 mg, 75 mg, Oral, Daily, Steven Nava DO, 75 mg at 06/14/25 0815    apixaban (ELIQUIS) tablet 5 mg, 5 mg, Oral, BID, Steven Nava, , 5 mg at 06/14/25 0815    atorvastatin (LIPITOR) tablet 40 mg, 40 mg, Oral, Nightly, Steven Nava, , 40 mg at 06/13/25 2135    pantoprazole (PROTONIX) tablet 40 mg, 40 mg, Oral, QAM AC, Steven Nava, , 40 mg at 06/14/25 0621    sodium chloride flush 0.9 % injection 5-40 mL, 5-40 mL, IntraVENous, 2 times per day, Steven Nava DO, 10 mL at  06/09/2025 12:24 PM     CMP:    Lab Results   Component Value Date/Time     06/14/2025 04:43 AM    K 4.1 06/14/2025 04:43 AM    K 4.0 11/25/2022 10:10 AM     06/14/2025 04:43 AM    CO2 28 06/14/2025 04:43 AM    BUN 20 06/14/2025 04:43 AM    CREATININE 1.0 06/14/2025 04:43 AM    GFRAA 59 05/26/2019 09:24 AM    LABGLOM 71 06/14/2025 04:43 AM    LABGLOM 55 11/26/2022 01:17 PM    GLUCOSE 112 06/14/2025 04:43 AM    CALCIUM 8.6 06/14/2025 04:43 AM    BILITOT 2.0 06/09/2025 12:24 PM    ALKPHOS 123 06/09/2025 12:24 PM    AST 34 06/09/2025 12:24 PM    ALT 26 06/09/2025 12:24 PM     Warfarin PT/INR:    Lab Results   Component Value Date    INR 3.3 06/12/2025    INR 3.4 06/11/2025    INR 6.0 (HH) 06/10/2025    PROTIME 36.3 (H) 06/12/2025    PROTIME 38.2 (H) 06/11/2025    PROTIME 66.5 (H) 06/10/2025       Assessment:    Principal Problem:    Acute respiratory failure with hypoxia (HCC)  Active Problems:    Acute on chronic combined systolic and diastolic CHF (congestive heart failure) (HCC)    Decreased dorsalis pedis pulse    Femoral-popliteal atherosclerosis    Status post endovascular aneurysm repair (EVAR)    Pain in left leg  Resolved Problems:    * No resolved hospital problems. *  pneumonia    Plan:  Dc planning check pulsox cont atb outpt f/u        Steven Nava DO  9:29 AM  6/14/2025

## 2025-06-14 NOTE — CARE COORDINATION
Care coordination  discharge order noted .  Spoke to RN.  Patient on 1L 92 %.  She will do a walking test and call if below 88. [facility] is following. .  SW will follow if needed.  Patient accepted for home care with [facility].  Orders have been sent and case placed.  Son will transport home.   1100  Pulse ox testing complete.  Will require 2 L oxygen for discharge.  Pulmonary notified to place order. On call Niurka ASHFORD notified.   Will follow.   1130:  [facility] not in network.  Referral to [facility].  Case accepted.  Information placed  [facility] and call to [facility] who states will deliver in the hour.  RN informed.  [facility] also aware of discharge.

## 2025-06-14 NOTE — PROGRESS NOTES
Pulse ox was 90% on room air at rest.   Ambulated patient on room air.     Oxygen saturation was 87% on room air while ambulating. (lowest saturation reached)   Oxygen applied.     Recovery pulse ox was 90% on 2 liters of oxygen while ambulating.     Pulm and social work notified of home O2 needs.    Jada Genao RN

## 2025-06-15 LAB
MICROORGANISM SPEC CULT: ABNORMAL
MICROORGANISM SPEC CULT: ABNORMAL
MICROORGANISM/AGENT SPEC: ABNORMAL
SPECIMEN DESCRIPTION: ABNORMAL

## 2025-06-16 ENCOUNTER — HOSPITAL ENCOUNTER (OUTPATIENT)
Age: 85
Setting detail: OBSERVATION
Discharge: NURSING, REHAB OR LONG-TERM CARE | End: 2025-06-19
Attending: STUDENT IN AN ORGANIZED HEALTH CARE EDUCATION/TRAINING PROGRAM | Admitting: INTERNAL MEDICINE
Payer: MEDICARE

## 2025-06-16 ENCOUNTER — APPOINTMENT (OUTPATIENT)
Dept: CT IMAGING | Age: 85
End: 2025-06-16
Payer: MEDICARE

## 2025-06-16 ENCOUNTER — TELEPHONE (OUTPATIENT)
Dept: OTHER | Age: 85
End: 2025-06-16

## 2025-06-16 ENCOUNTER — APPOINTMENT (OUTPATIENT)
Dept: GENERAL RADIOLOGY | Age: 85
End: 2025-06-16
Payer: MEDICARE

## 2025-06-16 DIAGNOSIS — J96.01 ACUTE HYPOXIC RESPIRATORY FAILURE (HCC): Primary | ICD-10-CM

## 2025-06-16 LAB
ALBUMIN SERPL-MCNC: 2.8 G/DL (ref 3.5–5.2)
ALP SERPL-CCNC: 78 U/L (ref 40–129)
ALT SERPL-CCNC: 18 U/L (ref 0–50)
ANION GAP SERPL CALCULATED.3IONS-SCNC: 11 MMOL/L (ref 7–16)
AST SERPL-CCNC: 44 U/L (ref 0–50)
BACTERIA URNS QL MICRO: ABNORMAL
BASOPHILS # BLD: 0.08 K/UL (ref 0–0.2)
BASOPHILS NFR BLD: 1 % (ref 0–2)
BILIRUB SERPL-MCNC: 1.5 MG/DL (ref 0–1.2)
BILIRUB UR QL STRIP: NEGATIVE
BNP SERPL-MCNC: ABNORMAL PG/ML (ref 0–450)
BUN SERPL-MCNC: 29 MG/DL (ref 8–23)
CALCIUM SERPL-MCNC: 8.8 MG/DL (ref 8.8–10.2)
CHLORIDE SERPL-SCNC: 102 MMOL/L (ref 98–107)
CLARITY UR: CLEAR
CO2 SERPL-SCNC: 27 MMOL/L (ref 22–29)
COLOR UR: YELLOW
CREAT SERPL-MCNC: 1.4 MG/DL (ref 0.7–1.2)
EOSINOPHIL # BLD: 0.19 K/UL (ref 0.05–0.5)
EOSINOPHILS RELATIVE PERCENT: 3 % (ref 0–6)
EPI CELLS #/AREA URNS HPF: ABNORMAL /HPF
ERYTHROCYTE [DISTWIDTH] IN BLOOD BY AUTOMATED COUNT: 17.9 % (ref 11.5–15)
FLUAV RNA RESP QL NAA+PROBE: NOT DETECTED
FLUBV RNA RESP QL NAA+PROBE: NOT DETECTED
GFR, ESTIMATED: 48 ML/MIN/1.73M2
GLUCOSE BLD-MCNC: 108 MG/DL (ref 74–99)
GLUCOSE SERPL-MCNC: 91 MG/DL (ref 74–99)
GLUCOSE UR STRIP-MCNC: NEGATIVE MG/DL
HCT VFR BLD AUTO: 32.1 % (ref 37–54)
HGB BLD-MCNC: 9.4 G/DL (ref 12.5–16.5)
HGB UR QL STRIP.AUTO: ABNORMAL
IMM GRANULOCYTES # BLD AUTO: 0.03 K/UL (ref 0–0.58)
IMM GRANULOCYTES NFR BLD: 1 % (ref 0–5)
KETONES UR STRIP-MCNC: ABNORMAL MG/DL
LEUKOCYTE ESTERASE UR QL STRIP: ABNORMAL
LYMPHOCYTES NFR BLD: 1.07 K/UL (ref 1.5–4)
LYMPHOCYTES RELATIVE PERCENT: 18 % (ref 20–42)
MCH RBC QN AUTO: 26.8 PG (ref 26–35)
MCHC RBC AUTO-ENTMCNC: 29.3 G/DL (ref 32–34.5)
MCV RBC AUTO: 91.5 FL (ref 80–99.9)
MONOCYTES NFR BLD: 0.73 K/UL (ref 0.1–0.95)
MONOCYTES NFR BLD: 12 % (ref 2–12)
NEUTROPHILS NFR BLD: 66 % (ref 43–80)
NEUTS SEG NFR BLD: 4 K/UL (ref 1.8–7.3)
NITRITE UR QL STRIP: NEGATIVE
PH UR STRIP: 6.5 [PH] (ref 5–8)
PLATELET # BLD AUTO: 294 K/UL (ref 130–450)
PMV BLD AUTO: 11.5 FL (ref 7–12)
POTASSIUM SERPL-SCNC: 4.1 MMOL/L (ref 3.5–5.1)
PROCALCITONIN SERPL-MCNC: 0.73 NG/ML (ref 0–0.08)
PROT SERPL-MCNC: 6.1 G/DL (ref 6.4–8.3)
PROT UR STRIP-MCNC: 30 MG/DL
RBC # BLD AUTO: 3.51 M/UL (ref 3.8–5.8)
RBC #/AREA URNS HPF: ABNORMAL /HPF
SARS-COV-2 RNA RESP QL NAA+PROBE: NOT DETECTED
SODIUM SERPL-SCNC: 139 MMOL/L (ref 136–145)
SOURCE: NORMAL
SP GR UR STRIP: 1.01 (ref 1–1.03)
SPECIMEN DESCRIPTION: NORMAL
TROPONIN I SERPL HS-MCNC: 100 NG/L (ref 0–22)
TROPONIN I SERPL HS-MCNC: 101 NG/L (ref 0–22)
UROBILINOGEN UR STRIP-ACNC: 1 EU/DL (ref 0–1)
WBC #/AREA URNS HPF: ABNORMAL /HPF
WBC OTHER # BLD: 6.1 K/UL (ref 4.5–11.5)

## 2025-06-16 PROCEDURE — 6370000000 HC RX 637 (ALT 250 FOR IP): Performed by: INTERNAL MEDICINE

## 2025-06-16 PROCEDURE — 71250 CT THORAX DX C-: CPT

## 2025-06-16 PROCEDURE — 87636 SARSCOV2 & INF A&B AMP PRB: CPT

## 2025-06-16 PROCEDURE — 83880 ASSAY OF NATRIURETIC PEPTIDE: CPT

## 2025-06-16 PROCEDURE — 71045 X-RAY EXAM CHEST 1 VIEW: CPT

## 2025-06-16 PROCEDURE — 99285 EMERGENCY DEPT VISIT HI MDM: CPT

## 2025-06-16 PROCEDURE — 87086 URINE CULTURE/COLONY COUNT: CPT

## 2025-06-16 PROCEDURE — 85025 COMPLETE CBC W/AUTO DIFF WBC: CPT

## 2025-06-16 PROCEDURE — 70450 CT HEAD/BRAIN W/O DYE: CPT

## 2025-06-16 PROCEDURE — 84484 ASSAY OF TROPONIN QUANT: CPT

## 2025-06-16 PROCEDURE — 80053 COMPREHEN METABOLIC PANEL: CPT

## 2025-06-16 PROCEDURE — G0378 HOSPITAL OBSERVATION PER HR: HCPCS

## 2025-06-16 PROCEDURE — 82962 GLUCOSE BLOOD TEST: CPT

## 2025-06-16 PROCEDURE — 81001 URINALYSIS AUTO W/SCOPE: CPT

## 2025-06-16 PROCEDURE — 93005 ELECTROCARDIOGRAM TRACING: CPT

## 2025-06-16 PROCEDURE — 2500000003 HC RX 250 WO HCPCS: Performed by: INTERNAL MEDICINE

## 2025-06-16 PROCEDURE — 84145 PROCALCITONIN (PCT): CPT

## 2025-06-16 RX ORDER — SODIUM CHLORIDE 9 MG/ML
INJECTION, SOLUTION INTRAVENOUS PRN
Status: DISCONTINUED | OUTPATIENT
Start: 2025-06-16 | End: 2025-06-19 | Stop reason: HOSPADM

## 2025-06-16 RX ORDER — PROCHLORPERAZINE EDISYLATE 5 MG/ML
10 INJECTION INTRAMUSCULAR; INTRAVENOUS EVERY 6 HOURS PRN
Status: DISCONTINUED | OUTPATIENT
Start: 2025-06-16 | End: 2025-06-19 | Stop reason: HOSPADM

## 2025-06-16 RX ORDER — MAGNESIUM SULFATE IN WATER 40 MG/ML
2000 INJECTION, SOLUTION INTRAVENOUS PRN
Status: DISCONTINUED | OUTPATIENT
Start: 2025-06-16 | End: 2025-06-19 | Stop reason: HOSPADM

## 2025-06-16 RX ORDER — ONDANSETRON 4 MG/1
4 TABLET, ORALLY DISINTEGRATING ORAL EVERY 8 HOURS PRN
Status: DISCONTINUED | OUTPATIENT
Start: 2025-06-16 | End: 2025-06-16

## 2025-06-16 RX ORDER — FUROSEMIDE 20 MG/1
40 TABLET ORAL DAILY
Status: DISCONTINUED | OUTPATIENT
Start: 2025-06-16 | End: 2025-06-19 | Stop reason: HOSPADM

## 2025-06-16 RX ORDER — FUROSEMIDE 40 MG/1
40 TABLET ORAL DAILY
Status: ON HOLD | COMMUNITY
End: 2025-06-18

## 2025-06-16 RX ORDER — PROCHLORPERAZINE MALEATE 10 MG
10 TABLET ORAL EVERY 8 HOURS PRN
Status: DISCONTINUED | OUTPATIENT
Start: 2025-06-16 | End: 2025-06-19 | Stop reason: HOSPADM

## 2025-06-16 RX ORDER — ASPIRIN 81 MG/1
81 TABLET, COATED ORAL DAILY
COMMUNITY

## 2025-06-16 RX ORDER — ATORVASTATIN CALCIUM 40 MG/1
40 TABLET, FILM COATED ORAL DAILY
Status: DISCONTINUED | OUTPATIENT
Start: 2025-06-16 | End: 2025-06-19 | Stop reason: HOSPADM

## 2025-06-16 RX ORDER — ATORVASTATIN CALCIUM 20 MG/1
20 TABLET, FILM COATED ORAL DAILY
COMMUNITY

## 2025-06-16 RX ORDER — LOSARTAN POTASSIUM 25 MG/1
25 TABLET ORAL DAILY
Status: ON HOLD | COMMUNITY
End: 2025-06-18 | Stop reason: HOSPADM

## 2025-06-16 RX ORDER — PANTOPRAZOLE SODIUM 40 MG/1
40 TABLET, DELAYED RELEASE ORAL DAILY
Status: DISCONTINUED | OUTPATIENT
Start: 2025-06-16 | End: 2025-06-19 | Stop reason: HOSPADM

## 2025-06-16 RX ORDER — PANTOPRAZOLE SODIUM 40 MG/1
40 TABLET, DELAYED RELEASE ORAL DAILY
COMMUNITY

## 2025-06-16 RX ORDER — ACETAMINOPHEN 325 MG/1
650 TABLET ORAL EVERY 6 HOURS PRN
Status: DISCONTINUED | OUTPATIENT
Start: 2025-06-16 | End: 2025-06-19 | Stop reason: HOSPADM

## 2025-06-16 RX ORDER — POTASSIUM CHLORIDE 7.45 MG/ML
10 INJECTION INTRAVENOUS PRN
Status: DISCONTINUED | OUTPATIENT
Start: 2025-06-16 | End: 2025-06-19 | Stop reason: HOSPADM

## 2025-06-16 RX ORDER — CHOLECALCIFEROL (VITAMIN D3) 25 MCG
1000 TABLET ORAL DAILY
COMMUNITY

## 2025-06-16 RX ORDER — ACETAMINOPHEN 650 MG/1
650 SUPPOSITORY RECTAL EVERY 6 HOURS PRN
Status: DISCONTINUED | OUTPATIENT
Start: 2025-06-16 | End: 2025-06-19 | Stop reason: HOSPADM

## 2025-06-16 RX ORDER — POLYETHYLENE GLYCOL 3350 17 G/17G
17 POWDER, FOR SOLUTION ORAL DAILY PRN
Status: DISCONTINUED | OUTPATIENT
Start: 2025-06-16 | End: 2025-06-19 | Stop reason: HOSPADM

## 2025-06-16 RX ORDER — APIXABAN 5 MG/1
5 TABLET, FILM COATED ORAL 2 TIMES DAILY
Status: ON HOLD | COMMUNITY
End: 2025-06-18 | Stop reason: HOSPADM

## 2025-06-16 RX ORDER — METOPROLOL SUCCINATE 50 MG/1
50 TABLET, EXTENDED RELEASE ORAL 2 TIMES DAILY
Status: ON HOLD | COMMUNITY
End: 2025-06-18 | Stop reason: HOSPADM

## 2025-06-16 RX ORDER — POTASSIUM CHLORIDE 1500 MG/1
40 TABLET, EXTENDED RELEASE ORAL PRN
Status: DISCONTINUED | OUTPATIENT
Start: 2025-06-16 | End: 2025-06-19 | Stop reason: HOSPADM

## 2025-06-16 RX ORDER — UMECLIDINIUM BROMIDE AND VILANTEROL TRIFENATATE 62.5; 25 UG/1; UG/1
1 POWDER RESPIRATORY (INHALATION) DAILY
Qty: 1 EACH | Refills: 3 | Status: SHIPPED | OUTPATIENT
Start: 2025-06-16

## 2025-06-16 RX ORDER — SODIUM CHLORIDE 0.9 % (FLUSH) 0.9 %
5-40 SYRINGE (ML) INJECTION EVERY 12 HOURS SCHEDULED
Status: DISCONTINUED | OUTPATIENT
Start: 2025-06-16 | End: 2025-06-19 | Stop reason: HOSPADM

## 2025-06-16 RX ORDER — SODIUM CHLORIDE 0.9 % (FLUSH) 0.9 %
5-40 SYRINGE (ML) INJECTION PRN
Status: DISCONTINUED | OUTPATIENT
Start: 2025-06-16 | End: 2025-06-19 | Stop reason: HOSPADM

## 2025-06-16 RX ORDER — LEVOFLOXACIN 750 MG/1
750 TABLET, FILM COATED ORAL DAILY
Status: ON HOLD | COMMUNITY
Start: 2025-06-13 | End: 2025-06-18 | Stop reason: HOSPADM

## 2025-06-16 RX ORDER — POTASSIUM CHLORIDE 1500 MG/1
20 TABLET, EXTENDED RELEASE ORAL DAILY
Status: ON HOLD | COMMUNITY
End: 2025-06-18 | Stop reason: HOSPADM

## 2025-06-16 RX ORDER — ONDANSETRON 2 MG/ML
4 INJECTION INTRAMUSCULAR; INTRAVENOUS EVERY 6 HOURS PRN
Status: DISCONTINUED | OUTPATIENT
Start: 2025-06-16 | End: 2025-06-16

## 2025-06-16 RX ORDER — ALBUTEROL SULFATE 0.83 MG/ML
2.5 SOLUTION RESPIRATORY (INHALATION) EVERY 6 HOURS PRN
Qty: 120 EACH | Refills: 3 | Status: SHIPPED | OUTPATIENT
Start: 2025-06-16

## 2025-06-16 RX ADMIN — APIXABAN 2.5 MG: 2.5 TABLET, FILM COATED ORAL at 12:38

## 2025-06-16 RX ADMIN — SODIUM CHLORIDE, PRESERVATIVE FREE 10 ML: 5 INJECTION INTRAVENOUS at 21:55

## 2025-06-16 RX ADMIN — APIXABAN 2.5 MG: 2.5 TABLET, FILM COATED ORAL at 21:50

## 2025-06-16 RX ADMIN — PANTOPRAZOLE SODIUM 40 MG: 40 TABLET, DELAYED RELEASE ORAL at 12:38

## 2025-06-16 RX ADMIN — FUROSEMIDE 40 MG: 20 TABLET ORAL at 12:38

## 2025-06-16 RX ADMIN — ATORVASTATIN CALCIUM 40 MG: 40 TABLET, FILM COATED ORAL at 12:38

## 2025-06-16 RX ADMIN — SACUBITRIL AND VALSARTAN 1 TABLET: 24; 26 TABLET, FILM COATED ORAL at 21:50

## 2025-06-16 RX ADMIN — ACETAMINOPHEN 650 MG: 325 TABLET ORAL at 21:54

## 2025-06-16 RX ADMIN — SACUBITRIL AND VALSARTAN 1 TABLET: 24; 26 TABLET, FILM COATED ORAL at 12:38

## 2025-06-16 ASSESSMENT — LIFESTYLE VARIABLES
HOW OFTEN DO YOU HAVE A DRINK CONTAINING ALCOHOL: NEVER
HOW MANY STANDARD DRINKS CONTAINING ALCOHOL DO YOU HAVE ON A TYPICAL DAY: PATIENT DOES NOT DRINK
HOW OFTEN DO YOU HAVE A DRINK CONTAINING ALCOHOL: NEVER
HOW MANY STANDARD DRINKS CONTAINING ALCOHOL DO YOU HAVE ON A TYPICAL DAY: PATIENT DOES NOT DRINK

## 2025-06-16 ASSESSMENT — PAIN DESCRIPTION - DESCRIPTORS: DESCRIPTORS: DISCOMFORT;SORE;THROBBING

## 2025-06-16 ASSESSMENT — PAIN DESCRIPTION - ORIENTATION: ORIENTATION: LEFT

## 2025-06-16 ASSESSMENT — PAIN DESCRIPTION - PAIN TYPE: TYPE: CHRONIC PAIN

## 2025-06-16 ASSESSMENT — PAIN - FUNCTIONAL ASSESSMENT
PAIN_FUNCTIONAL_ASSESSMENT: NONE - DENIES PAIN
PAIN_FUNCTIONAL_ASSESSMENT: PREVENTS OR INTERFERES SOME ACTIVE ACTIVITIES AND ADLS

## 2025-06-16 ASSESSMENT — PAIN DESCRIPTION - LOCATION: LOCATION: LEG

## 2025-06-16 ASSESSMENT — PAIN DESCRIPTION - ONSET: ONSET: ON-GOING

## 2025-06-16 ASSESSMENT — PAIN DESCRIPTION - FREQUENCY: FREQUENCY: CONTINUOUS

## 2025-06-16 ASSESSMENT — PAIN SCALES - GENERAL: PAINLEVEL_OUTOF10: 8

## 2025-06-16 NOTE — PROGRESS NOTES
4 Eyes Skin Assessment     NAME:  Trace Nassar  YOB: 1940  MEDICAL RECORD NUMBER:  [id number]    The patient is being assessed for  Admission    I agree that at least one RN has performed a thorough Head to Toe Skin Assessment on the patient. ALL assessment sites listed below have been assessed.      Areas assessed by both nurses:    Head, Face, Ears, Shoulders, Back, Chest, Arms, Elbows, Hands, Sacrum. Buttock, Coccyx, Ischium, Legs. Feet and Heels, and Under Medical Devices         Does the Patient have a Wound? No noted wound(s)       Vidal Prevention initiated by RN: No  Wound Care Orders initiated by RN: No    Pressure Injury (Stage 3,4, Unstageable, DTI, NWPT, and Complex wounds) if present, place Wound referral order by RN under : No    New Ostomies, if present place, Ostomy referral order under : No     Nurse 1 eSignature: Electronically signed by Bryan Frederick RN on 6/16/25 at 1:48 PM EDT    **SHARE this note so that the co-signing nurse can place an eSignature**    Nurse 2 eSignature: Electronically signed by BHARTI URIAS RN on 6/16/25 at 2:05 PM EDT

## 2025-06-16 NOTE — H&P
[facility]              [address] [place] [address]                           HISTORY & PHYSICAL      PATIENT NAME: ALYSHA JOSUE        : 1940  MED REC NO: [id number]                        ROOM: 8211  ACCOUNT NO: [id number]                       ADMIT DATE: 2025  PROVIDER: Steven Nava DO      PRIMARY CARE PHYSICIAN:  Silvino Garcia DO    CHIEF COMPLAINT:  Confusion.    HISTORY OF PRESENT ILLNESS:  The patient is an 85-year-old  male who presented to the emergency room for evaluation of confusion.  The patient was recently hospitalized for pneumonia, discharged home on Levaquin 750 mg daily per Pulmonary for Pseudomonas pneumonia.  The patient presented with acute confusion.  He was discharged home on 2 L of oxygen nasal cannula.  He was hypoxic on presentation this admission.    PAST MEDICAL HISTORY:  Atrial fibrillation, chronic Eliquis therapy, elevated cholesterol, severe aortic stenosis, left ventricular systolic dysfunction, arthritis, coronary artery disease, COPD, and chronic systolic congestive heart failure, peripheral artery disease.    PAST SURGICAL HISTORY:  Abdominal aortic aneurysm repair, coronary angioplasty with stent placement, cardioversion, hernia repair, abdominal surgery, eye surgery, appendectomy, left carotid endarterectomy.    SOCIAL HISTORY:  The patient quit tobacco.  Occasional alcohol.    REVIEW OF SYSTEMS:  Remarkable for above-stated chief complaint.    ALLERGIES:  PENICILLIN AND SULFA.      MEDICATIONS:  Prior to admission, which are obtained from most recent hospital discharge med rec on 2025, include Plavix 75 mg daily, furosemide 40 mg daily, levofloxacin 750 mg daily, Entresto 24/ one b.i.d., Eliquis 5 mg b.i.d., atorvastatin 40 mg daily, pantoprazole 40 mg daily.    PHYSICAL EXAMINATION:  GENERAL APPEARANCE:  Reveals an 85-year-old  male, who is alert, cooperative,

## 2025-06-16 NOTE — PLAN OF CARE
Problem: Safety - Adult  Goal: Free from fall injury  Outcome: Progressing     Problem: Skin/Tissue Integrity  Goal: Skin integrity remains intact  Description: 1.  Monitor for areas of redness and/or skin breakdown  2.  Assess vascular access sites hourly  3.  Every 4-6 hours minimum:  Change oxygen saturation probe site  4.  Every 4-6 hours:  If on nasal continuous positive airway pressure, respiratory therapy assess nares and determine need for appliance change or resting period  Outcome: Progressing     Problem: Cardiovascular - Adult  Goal: Maintains optimal cardiac output and hemodynamic stability  Outcome: Progressing     Problem: Cardiovascular - Adult  Goal: Absence of cardiac dysrhythmias or at baseline  Outcome: Progressing     Problem: Discharge Planning  Goal: Discharge to home or other facility with appropriate resources  Outcome: Progressing     Problem: Chronic Conditions and Co-morbidities  Goal: Patient's chronic conditions and co-morbidity symptoms are monitored and maintained or improved  Outcome: Progressing

## 2025-06-16 NOTE — ED PROVIDER NOTES
[facility]  EMERGENCY DEPARTMENT ENCOUNTER        Pt Name: Trace Nassar  MRN: [id number]  Birthdate 1940  Date of evaluation: 6/16/2025  Provider: Pooja Muhammad MD  PCP: Silvino Garcia DO  Note Started: 4:10 AM EDT 6/16/25    CHIEF COMPLAINT       Chief Complaint   Patient presents with    Altered Mental Status     Patient sent in to ER for confusion.  Son said patient was acting restless per EMS.  Patient states that he was recently discharged from hospital.  Patient hypoxic on baseline 2 L nasal cannula.        HISTORY OF PRESENT ILLNESS: 1 or more Elements   History From: Patient, EMS    Limitations to history : None    Trace Nassar is a 85 y.o. male who presents via EMS from home for altered mental status beginning tonight.  EMS reports that patient lives with son.  Son had told them that patient had woke up and appeared to be confused and restless.  Patient was reaching for call button stating he was trying to call the nurse.  Patient was recently admitted for aortic aneurysm and was discharged yesterday.  Patient was discharged home on oxygen.  Patient states he feels fine.  He notes occasional numbness to his left foot which has been ongoing issue. He has no other complaints at this time. Pt is on Eliquis.     Patient denies fever, chills, headache, chest pain, abdominal pain, nausea, vomiting, diarrhea, lightheadedness, dysuria, hematuria, hematochezia, and melena.    Nursing Notes were all reviewed and agreed with or any disagreements were addressed in the HPI.    REVIEW OF EXTERNAL NOTES :      CTA with runoff done on 6/11/2025 which resulted in abdominal aorta: 6.2 cm infrarenal abdominal aortic aneurysm.  Patient is status post aortic stent graft placement. Study is positive for endoleak but aneurysm sac diameter is stable.  Patient was seen by vascular surgery.  Upon discharge, left foot pain had resolved.  Patient reported some numbness in his left toes.  Stable for  administration in time range)     Or   potassium chloride 10 mEq/100 mL IVPB (Peripheral Line) (has no administration in time range)   magnesium sulfate 2000 mg in 50 mL IVPB premix (has no administration in time range)   polyethylene glycol (GLYCOLAX) packet 17 g (has no administration in time range)   acetaminophen (TYLENOL) tablet 650 mg (650 mg Oral Given 6/17/25 1151)     Or   acetaminophen (TYLENOL) suppository 650 mg ( Rectal See Alternative 6/17/25 1151)   prochlorperazine (COMPAZINE) tablet 10 mg (has no administration in time range)     Or   prochlorperazine (COMPAZINE) injection 10 mg (has no administration in time range)   metoprolol succinate (TOPROL XL) extended release tablet 25 mg (25 mg Oral Given 6/17/25 1335)   busPIRone (BUSPAR) tablet 5 mg (5 mg Oral Given 6/17/25 1357)   magnesium sulfate 2000 mg in 50 mL IVPB premix (0 mg IntraVENous Stopped 6/17/25 1151)         Medical Decision Making/Differential Diagnosis:    CC/HPI Summary, Social Determinants of health, Records Reviewed, DDx, testing done/not done, ED Course, Reassessment, disposition considerations/shared decision making with patient, consults, disposition:      ED Course as of 06/17/25 1707   Mon Jun 16, 2025   0502 EKG shows A-fib, 70 bpm, no STEMI.  QTc prolonged, right bundle branch block, similar to prior.   [FG]      ED Course User Index  [FG] Sathish Styles DO        This is an 84 y/o male with hx of A Fib on Eliquis, CAD, COPD, CHF who presents via EMS from home for AMS. Recent admission for aortic aneurysm and was discharged home on 2L of oxygen yesterday. Differential diagnosis includes sundowning, infectious process, hypoxia, UTI, arrhythmia, ACS, to name a few. Pt is hemodynamically stable. In no acute distress. Awake, alert, and oriented. 4L of O2 in place.  Diminished breath sounds, no wheezes, rales, or rhonchi. Heart is regular in rate, irregular rhythm. +murmur. No focal neurological deficits. Peripheral pulses intact.

## 2025-06-16 NOTE — PROGRESS NOTES
Physician Progress Note      PATIENT:               ALYSHA JOSUE  [facility] #:                  [id number]  :                       1940  ADMIT DATE:       2025 11:53 AM  DISCH DATE:        2025 5:45 PM  RESPONDING  PROVIDER #:        ARLEEN PAINTER          QUERY TEXT:    Acute respiratory failure is documented in the medical record in the H&P and   throughout record. Please provide additional clinical indicators supportive of   your documentation. Or please document if the diagnosis of acute respiratory   failure has been ruled out after study.    The clinical indicators include:  --Pt. admitted with CHF, pneumonia  --\"He is not in any distress. The patient is lying in bed comfortably without   any distress.  Breathing is not labored\" (Per pulm consult 6/10 Dr. Hammonds/   ROSELINE Painter CNP)  --Reports mild SOB prior to arrival but has improved. No accessory muscle use   or intercostal retractions. Awake, alert, no acute respiratory distress (Per   Cards consult note 6/10 Dr. Looney)  --O2 sats 89-98 on 2-3 L NC. (Per VS flowsheet)  Options provided:  -- Acute Respiratory Failure as evidenced by, Please document evidence.  -- Acute Respiratory Failure ruled out after study  -- Other - I will add my own diagnosis  -- Disagree - Not applicable / Not valid  -- Disagree - Clinically unable to determine / Unknown  -- Refer to Clinical Documentation Reviewer    PROVIDER RESPONSE TEXT:    Acute Respiratory Failure has been ruled out after study.    Query created by: Layla Zamarripa on 2025 10:05 AM      Electronically signed by:  ARLEEN PAINTER 2025 8:30 AM

## 2025-06-16 NOTE — CONSULTS
Joe Cordero M.D.,[facility]  Daniel Szymanski D.O., [facility]., [facility]  Mario Alberto Fleming M.D.  Emelia Munson M.D.   Carlos Hammonds D.O.  Steven Barillas M.D.       Patient:  Trace Nassar 85 y.o. male MRN: [id number]           PULMONARY CONSULTATION    Reason for Consultation: acute resp failure confusion on levaquin  Referring Physician: Steven Nava DO    Communication with the referring physician will be sent via the electronic medical record.    Chief Complaint: altered mental statis    CODE STATUS: FULL CODE    SUBJECTIVE:  HPI:  Trace Nassar is a 85 y.o. male we were asked to evaluate for acute respiratory failure and confusion on levaquin.    He was just discharged from the hospital a few days where we treated him for pseudomonas pneumonia. He was on Cefepime and transitioned to Levaquin at discharge. He was discharged home on 2 liters NC which was new from his original baseline of room air. 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 He did just have AAA repair on 5/16/2025. He was also being seen by Vascular Surgery for cold and numbness to the left foot with pain. 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 There was concern for aspiration and he had a video swallow with recommendations for minced and moist solids with nectar thick liquids.    He returns to the hospital hypoxic on the 2 liters he was sent home with. Family was also reporting altered mental status. While in the ED, repeat imaging showed improvement with the pneumonia. He has been requiring additional oxygen up to 4 liters. He has no elevation in WBCs and has remained afebrile.    The patient was seen and examined just coming to the floor from the ED, in no distress. Denies any difficultly breathing. SpO2 currently 99% on 3 liters. We

## 2025-06-16 NOTE — PROGRESS NOTES
Chart accessed for pending admission        Electronically signed by BHARTI URIAS RN on 6/16/2025 at 12:32 PM

## 2025-06-16 NOTE — TELEPHONE ENCOUNTER
10:42 AM  Spoke with pts son Vas regarding No show from today's appt. Per son pt is currently being treated in the ER For confusion with plan for admission. Pt rescheduled for 6/30/25.

## 2025-06-17 PROBLEM — I50.23 ACUTE ON CHRONIC SYSTOLIC CONGESTIVE HEART FAILURE (HCC): Status: ACTIVE | Noted: 2025-06-17

## 2025-06-17 LAB
ANION GAP SERPL CALCULATED.3IONS-SCNC: 14 MMOL/L (ref 7–16)
BUN SERPL-MCNC: 27 MG/DL (ref 8–23)
CALCIUM SERPL-MCNC: 8.8 MG/DL (ref 8.8–10.2)
CHLORIDE SERPL-SCNC: 103 MMOL/L (ref 98–107)
CO2 SERPL-SCNC: 25 MMOL/L (ref 22–29)
CREAT SERPL-MCNC: 1.5 MG/DL (ref 0.7–1.2)
EKG ATRIAL RATE: 64 BPM
EKG Q-T INTERVAL: 482 MS
EKG QRS DURATION: 150 MS
EKG QTC CALCULATION (BAZETT): 520 MS
EKG R AXIS: 12 DEGREES
EKG T AXIS: -67 DEGREES
EKG VENTRICULAR RATE: 70 BPM
ERYTHROCYTE [DISTWIDTH] IN BLOOD BY AUTOMATED COUNT: 17.7 % (ref 11.5–15)
GFR, ESTIMATED: 46 ML/MIN/1.73M2
GLUCOSE SERPL-MCNC: 100 MG/DL (ref 74–99)
HCT VFR BLD AUTO: 30.6 % (ref 37–54)
HGB BLD-MCNC: 8.8 G/DL (ref 12.5–16.5)
MAGNESIUM SERPL-MCNC: 1.8 MG/DL (ref 1.6–2.4)
MCH RBC QN AUTO: 26.5 PG (ref 26–35)
MCHC RBC AUTO-ENTMCNC: 28.8 G/DL (ref 32–34.5)
MCV RBC AUTO: 92.2 FL (ref 80–99.9)
MICROORGANISM SPEC CULT: NO GROWTH
PLATELET # BLD AUTO: 226 K/UL (ref 130–450)
PMV BLD AUTO: 10.9 FL (ref 7–12)
POTASSIUM SERPL-SCNC: 3.9 MMOL/L (ref 3.5–5.1)
RBC # BLD AUTO: 3.32 M/UL (ref 3.8–5.8)
SERVICE CMNT-IMP: NORMAL
SODIUM SERPL-SCNC: 141 MMOL/L (ref 136–145)
SPECIMEN DESCRIPTION: NORMAL
WBC OTHER # BLD: 4.4 K/UL (ref 4.5–11.5)

## 2025-06-17 PROCEDURE — 36415 COLL VENOUS BLD VENIPUNCTURE: CPT

## 2025-06-17 PROCEDURE — 96366 THER/PROPH/DIAG IV INF ADDON: CPT

## 2025-06-17 PROCEDURE — 83735 ASSAY OF MAGNESIUM: CPT

## 2025-06-17 PROCEDURE — 85027 COMPLETE CBC AUTOMATED: CPT

## 2025-06-17 PROCEDURE — 6360000002 HC RX W HCPCS

## 2025-06-17 PROCEDURE — 96365 THER/PROPH/DIAG IV INF INIT: CPT

## 2025-06-17 PROCEDURE — 6370000000 HC RX 637 (ALT 250 FOR IP): Performed by: INTERNAL MEDICINE

## 2025-06-17 PROCEDURE — 80048 BASIC METABOLIC PNL TOTAL CA: CPT

## 2025-06-17 PROCEDURE — 99223 1ST HOSP IP/OBS HIGH 75: CPT | Performed by: INTERNAL MEDICINE

## 2025-06-17 PROCEDURE — 93010 ELECTROCARDIOGRAM REPORT: CPT | Performed by: INTERNAL MEDICINE

## 2025-06-17 PROCEDURE — G0378 HOSPITAL OBSERVATION PER HR: HCPCS

## 2025-06-17 PROCEDURE — APPSS60 APP SPLIT SHARED TIME 46-60 MINUTES

## 2025-06-17 PROCEDURE — 2500000003 HC RX 250 WO HCPCS: Performed by: INTERNAL MEDICINE

## 2025-06-17 RX ORDER — MAGNESIUM SULFATE IN WATER 40 MG/ML
2000 INJECTION, SOLUTION INTRAVENOUS ONCE
Status: COMPLETED | OUTPATIENT
Start: 2025-06-17 | End: 2025-06-17

## 2025-06-17 RX ORDER — METOPROLOL SUCCINATE 25 MG/1
25 TABLET, EXTENDED RELEASE ORAL 2 TIMES DAILY
Status: DISCONTINUED | OUTPATIENT
Start: 2025-06-17 | End: 2025-06-19 | Stop reason: HOSPADM

## 2025-06-17 RX ORDER — BUSPIRONE HYDROCHLORIDE 5 MG/1
5 TABLET ORAL 3 TIMES DAILY PRN
Status: DISCONTINUED | OUTPATIENT
Start: 2025-06-17 | End: 2025-06-19 | Stop reason: HOSPADM

## 2025-06-17 RX ADMIN — BUSPIRONE HYDROCHLORIDE 5 MG: 5 TABLET ORAL at 13:57

## 2025-06-17 RX ADMIN — SODIUM CHLORIDE, PRESERVATIVE FREE 10 ML: 5 INJECTION INTRAVENOUS at 08:06

## 2025-06-17 RX ADMIN — MAGNESIUM SULFATE HEPTAHYDRATE 2000 MG: 40 INJECTION, SOLUTION INTRAVENOUS at 09:41

## 2025-06-17 RX ADMIN — APIXABAN 2.5 MG: 2.5 TABLET, FILM COATED ORAL at 20:19

## 2025-06-17 RX ADMIN — ACETAMINOPHEN 650 MG: 325 TABLET ORAL at 11:51

## 2025-06-17 RX ADMIN — SACUBITRIL AND VALSARTAN 1 TABLET: 24; 26 TABLET, FILM COATED ORAL at 08:06

## 2025-06-17 RX ADMIN — SODIUM CHLORIDE, PRESERVATIVE FREE 10 ML: 5 INJECTION INTRAVENOUS at 20:23

## 2025-06-17 RX ADMIN — PANTOPRAZOLE SODIUM 40 MG: 40 TABLET, DELAYED RELEASE ORAL at 08:06

## 2025-06-17 RX ADMIN — METOPROLOL SUCCINATE 25 MG: 25 TABLET, EXTENDED RELEASE ORAL at 20:19

## 2025-06-17 RX ADMIN — FUROSEMIDE 40 MG: 20 TABLET ORAL at 08:05

## 2025-06-17 RX ADMIN — METOPROLOL SUCCINATE 25 MG: 25 TABLET, EXTENDED RELEASE ORAL at 13:35

## 2025-06-17 RX ADMIN — ACETAMINOPHEN 650 MG: 325 TABLET ORAL at 05:35

## 2025-06-17 RX ADMIN — APIXABAN 2.5 MG: 2.5 TABLET, FILM COATED ORAL at 08:06

## 2025-06-17 RX ADMIN — ATORVASTATIN CALCIUM 40 MG: 40 TABLET, FILM COATED ORAL at 08:05

## 2025-06-17 RX ADMIN — ACETAMINOPHEN 650 MG: 325 TABLET ORAL at 20:17

## 2025-06-17 ASSESSMENT — PAIN DESCRIPTION - ONSET: ONSET: ON-GOING

## 2025-06-17 ASSESSMENT — PAIN SCALES - GENERAL
PAINLEVEL_OUTOF10: 0
PAINLEVEL_OUTOF10: 3
PAINLEVEL_OUTOF10: 8
PAINLEVEL_OUTOF10: 3
PAINLEVEL_OUTOF10: 7

## 2025-06-17 ASSESSMENT — PAIN DESCRIPTION - PAIN TYPE: TYPE: ACUTE PAIN

## 2025-06-17 ASSESSMENT — PAIN DESCRIPTION - LOCATION
LOCATION: FOOT;LEG
LOCATION: LEG
LOCATION: HEAD

## 2025-06-17 ASSESSMENT — PAIN DESCRIPTION - ORIENTATION
ORIENTATION: RIGHT;LEFT
ORIENTATION: LEFT
ORIENTATION: RIGHT;LEFT;LOWER

## 2025-06-17 ASSESSMENT — PAIN DESCRIPTION - DESCRIPTORS
DESCRIPTORS: ACHING;DISCOMFORT;SORE
DESCRIPTORS: ACHING;TINGLING;DISCOMFORT
DESCRIPTORS: DISCOMFORT;THROBBING;ACHING

## 2025-06-17 ASSESSMENT — PAIN DESCRIPTION - FREQUENCY: FREQUENCY: CONTINUOUS

## 2025-06-17 ASSESSMENT — PAIN - FUNCTIONAL ASSESSMENT: PAIN_FUNCTIONAL_ASSESSMENT: PREVENTS OR INTERFERES SOME ACTIVE ACTIVITIES AND ADLS

## 2025-06-17 NOTE — CONSULTS
CARDIOLOGY ATTENDING ATTESTATION   I spent > 51% of the total time involved with completing the encounter. The total time included the following:  Independently interviewing the patient (HPI, ROS, PMH, PSH, FMH, SH, allergies, and medications)  Independently performing a medically appropriate examination  Reviewing the above documentation completed by the TON  Ordering medications, tests, and/or procedures  Formulating the assessment/plan and reviewing the rationale for the above recommendations  Reviewing available records, results of all previously ordered testing/procedures, and current problem list  Counseling/educating the patient  Coordinating care with other healthcare professionals  Communicating results to the patient's family/caregiver  Documenting clinical information in the patient's electronic health record    Physical Exam   /67   Pulse 68   Temp 97.7 °F (36.5 °C) (Oral)   Resp 18   Ht 1.626 m (5' 4\")   Wt 59 kg (130 lb)   SpO2 96%   BMI 22.31 kg/m²   Constitutional: Oriented to person, place, and time. Well-developed and well-nourished. No distress.    Head: Normocephalic and atraumatic.   Eyes: EOM are normal. Pupils are equal, round, and reactive to light.   Neck: Normal range of motion. Neck supple. No hepatojugular reflux and no JVD present. Carotid bruit is not present. No tracheal deviation present. No thyromegaly present.   Cardiovascular: Normal rate, regular rhythm, normal heart sounds and intact distal pulses.  Exam reveals no gallop and no friction rub.  No murmur heard.  Pulmonary/Chest: Effort normal and breath sounds normal. No respiratory distress. No wheezes. No rales. No tenderness.   Abdominal: Soft. Bowel sounds are normal. No distension and no mass. No tenderness. No rebound and no guarding.   Musculoskeletal: Normal range of motion. No edema and no tenderness.   Lymphadenopathy:   No cervical adenopathy.   Neurological: Alert and oriented to person, place, and time.

## 2025-06-17 NOTE — PROGRESS NOTES
Joe Cordero M.D.,[facility]  Daniel Szymanski D.O., QUEENIE., [facility]  Janis Fleming M.D.  Emelia Munson M.D.   Carlos Hammonds D.O.  Steven Barillas M.D.         Daily Pulmonary Progress Note    Patient:  Trace Nassar 85 y.o. male MRN: [id number]            Synopsis     We are following patient for resolving pneumonia    \"CC\" altered mental status    Code status: FULL CODE      Subjective   6/16/25: The patient was seen and examined just coming to the floor from the ED, in no distress. Denies any difficultly breathing. SpO2 currently 99% on 3 liters. We weaned him down to 2 liters. He reports he was doing well and went shopping at [employer] without his oxygen and collapsed. He reports the leg pain and numbness he had last week has resolved     6/17/25: Patient was seen and examined lying in bed, son present at bedside. Patient doing well and voices no issues with his breathing. He is reporting pain again in his left foot.      Review of Systems:  Constitutional: Denies fever, weight loss, night sweats, and fatigue  Skin: Denies pigmentation, dark lesions, and rashes   HEENT: Denies hearing loss, tinnitus, ear drainage, epistaxis, sore throat, and hoarseness.  Cardiovascular: Denies palpitations, chest pain, and chest pressure.  Respiratory: Denies cough, dyspnea at rest, hemoptysis, apnea, and choking.  Gastrointestinal: Denies nausea, vomiting, poor appetite, diarrhea, heartburn or reflux  Genitourinary: Denies dysuria, frequency, urgency or hematuria  Musculoskeletal: left foot pain  Neurological: Denies dizziness, vertigo, headache, and focal weakness  Psychological: Denies anxiety and depression  Endocrine: Denies heat intolerance and cold intolerance  Hematopoietic/Lymphatic: Denies bleeding problems and blood transfusions    24-hour events:  No new events    Objective   OBJECTIVE:   /67   Pulse 68   Temp 97.7 °F (36.5 °C) (Oral)   Resp 18   Ht 1.626 m (5' 4\")   Wt 59 kg (130 lb)    is small.      Second Obtuse Marginal Branch   The vessel is small.      Third Obtuse Marginal Branch   The vessel is moderate in size. The vessel exhibits minimal luminal irregularities.      Left Posterior Descending Artery   The vessel is small. The vessel exhibits minimal luminal irregularities.      First Left Posterolateral Branch   The vessel is small.      Right Coronary Artery   The vessel is small.   Collaterals   Dist RCA filled by collaterals from LPAV.       Mid RCA to Dist RCA lesion, 100% stenosed.          Labs:  Lab Results   Component Value Date/Time    WBC 4.4 06/17/2025 04:30 AM    RBC 3.32 06/17/2025 04:30 AM    HGB 8.8 06/17/2025 04:30 AM    HCT 30.6 06/17/2025 04:30 AM    MCV 92.2 06/17/2025 04:30 AM    MCH 26.5 06/17/2025 04:30 AM    MCHC 28.8 06/17/2025 04:30 AM    RDW 17.7 06/17/2025 04:30 AM     06/17/2025 04:30 AM    MPV 10.9 06/17/2025 04:30 AM     Lab Results   Component Value Date/Time     06/17/2025 04:30 AM    K 3.9 06/17/2025 04:30 AM     06/17/2025 04:30 AM    CO2 25 06/17/2025 04:30 AM    BUN 27 06/17/2025 04:30 AM    CREATININE 1.5 06/17/2025 04:30 AM    CALCIUM 8.8 06/17/2025 04:30 AM    LABGLOM 46 06/17/2025 04:30 AM       Recent Labs     06/16/25  0651   PROBNP 12,163*       Recent Labs     06/16/25  1119   PROCAL 0.73*       Micro:  Component  Ref Range & Units (hover)    Specimen Description .EXPECTORATED SPUTUM   Direct Exam RARE EPITHELIAL CELLS    FEW Polymorphonuclear leukocytes    NO ORGANISMS SEEN   Culture NORMAL RESPIRATORY KAYCE    PSEUDOMONAS AERUGINOSA Identification by MALDI-TOF RARE GROWTH Abnormal    Resulting Agency [facility]        Susceptibility    Pseudomonas aeruginosa (2)    Antibiotic Interpretation CHANELL Method Status    piperacillin-tazobactam Sensitive 0.25 BACTERIAL SUSCEPTIBILITY PANEL BY E-TEST Final    cefepime Sensitive 0.019 BACTERIAL SUSCEPTIBILITY PANEL BY E-TEST Final    cefepime Sensitive 0.5

## 2025-06-17 NOTE — PLAN OF CARE
Problem: Safety - Adult  Goal: Free from fall injury  6/17/2025 0934 by Bryan Frederick RN  Outcome: Progressing     Problem: Skin/Tissue Integrity  Goal: Skin integrity remains intact  Description: 1.  Monitor for areas of redness and/or skin breakdown  2.  Assess vascular access sites hourly  3.  Every 4-6 hours minimum:  Change oxygen saturation probe site  4.  Every 4-6 hours:  If on nasal continuous positive airway pressure, respiratory therapy assess nares and determine need for appliance change or resting period  6/17/2025 0934 by Bryan Frederick RN  Outcome: Progressing     Problem: Respiratory - Adult  Goal: Achieves optimal ventilation and oxygenation  6/17/2025 0934 by Bryan Frederick RN  Outcome: Progressing     Problem: Cardiovascular - Adult  Goal: Maintains optimal cardiac output and hemodynamic stability  6/17/2025 0934 by Bryan Frederick RN  Outcome: Progressing     Problem: Cardiovascular - Adult  Goal: Absence of cardiac dysrhythmias or at baseline  6/17/2025 0934 by Bryan Frederick RN  Outcome: Progressing     Problem: Discharge Planning  Goal: Discharge to home or other facility with appropriate resources  6/17/2025 0934 by Bryan Frederick RN  Outcome: Progressing     Problem: Chronic Conditions and Co-morbidities  Goal: Patient's chronic conditions and co-morbidity symptoms are monitored and maintained or improved  6/17/2025 0934 by Bryan Frederick RN  Outcome: Progressing     Problem: Pain  Goal: Verbalizes/displays adequate comfort level or baseline comfort level  6/17/2025 0934 by Bryan Frederick RN  Outcome: Progressing

## 2025-06-17 NOTE — PROGRESS NOTES
Updated structural heart team on patient's admission, spoke with KOSTA Mckay regarding plans for TAVR.  She will be contacting the patient to update him regarding TAVR.  Nursing to make patient / family aware.

## 2025-06-17 NOTE — PROGRESS NOTES
[facility] Medicine    Subjective:  pt alert conversive      Current Facility-Administered Medications:     atorvastatin (LIPITOR) tablet 40 mg, 40 mg, Oral, Daily, Steven Nava DO, 40 mg at 06/16/25 1238    apixaban (ELIQUIS) tablet 2.5 mg, 2.5 mg, Oral, BID, Steven Nava DO, 2.5 mg at 06/16/25 2150    furosemide (LASIX) tablet 40 mg, 40 mg, Oral, Daily, Steven Nava DO, 40 mg at 06/16/25 1238    pantoprazole (PROTONIX) tablet 40 mg, 40 mg, Oral, Daily, Steven Nava DO, 40 mg at 06/16/25 1238    sacubitril-valsartan (ENTRESTO) 24-26 MG per tablet 1 tablet, 1 tablet, Oral, BID, Steven Nava DO, 1 tablet at 06/16/25 2150    sodium chloride flush 0.9 % injection 5-40 mL, 5-40 mL, IntraVENous, 2 times per day, Steven Nava DO, 10 mL at 06/16/25 2155    sodium chloride flush 0.9 % injection 5-40 mL, 5-40 mL, IntraVENous, PRN, Steven Nava DO    0.9 % sodium chloride infusion, , IntraVENous, PRN, Steven Nava DO    potassium chloride (KLOR-CON M) extended release tablet 40 mEq, 40 mEq, Oral, PRN **OR** potassium bicarb-citric acid (EFFER-K) effervescent tablet 40 mEq, 40 mEq, Oral, PRN **OR** potassium chloride 10 mEq/100 mL IVPB (Peripheral Line), 10 mEq, IntraVENous, PRN, Steven Nava DO    magnesium sulfate 2000 mg in 50 mL IVPB premix, 2,000 mg, IntraVENous, PRN, Steven Nava DO    polyethylene glycol (GLYCOLAX) packet 17 g, 17 g, Oral, Daily PRN, Steven Nava DO    acetaminophen (TYLENOL) tablet 650 mg, 650 mg, Oral, Q6H PRN, 650 mg at 06/17/25 0535 **OR** acetaminophen (TYLENOL) suppository 650 mg, 650 mg, Rectal, Q6H PRN, Malmer, Steven M, DO    prochlorperazine (COMPAZINE) tablet 10 mg, 10 mg, Oral, Q8H PRN **OR** prochlorperazine (COMPAZINE) injection 10 mg, 10 mg, IntraVENous, Q6H PRN, Steven Nava, DO    Objective:    /63   Pulse 66   Temp 97.9 °F (36.6 °C) (Oral)   Resp 18   Ht 1.626 m (5' 4\")   Wt 59 kg (130 lb)   SpO2 95%

## 2025-06-17 NOTE — CONSULTS
Inpatient Cardiology Consultation      Reason for Consult: Chronic CHF aortic stenosis/patient scheduled for TAVR coming up    Consulting Physician: Dr. Lang    Requesting Physician: Dr. Nava    Date of Consultation: 6/17/2025    HISTORY OF PRESENT ILLNESS:   Trace Nassar  is a 85 y.o.  male known to [facility] cardiology Dr. Layne last seen in office 4/17/2025 in routine follow-up.  Most recently seen as inpatient consultation by Dr. Luther 5/5/2025 for possible TAVR, underwent EVAR to fix abdominal aortic aneurysm 5/16 (Dr. Marmolejo).  Plans for TAVR in near future.  Seen in office by vascular surgery on 6/3/2025 where he was noted to have weakness, limited mobility secondary to SMITH and squeezing sensation in left leg.     Recent encounters:   4/22/2025: Presented to [facility] for syncope while shopping at [employer], he became very weak and when he reached out to grab something fell.  Unknown if LOC.  EMS reported atrial flutter, confirmed by EKG in ED. See. By Cardiology, Dr. Arora TTE revealed severe aortic stenosis. Also noted to have AF with slow ventricular response (40-50bpm) -- metoprolol discontinued as well as hypotension --  Lasix held.  [place] [facility] [place] for evaluation by CTS/structural heart specialist and EP to discuss pacemaker implantation.  Plan for heart catheterization 4/25 that was postponed due to elevated INR  3.5-1.9 s/p vitamin K, placed on Lovenox for anticoagulation. 4/26: Seen by CT surgery (Dr. Jama) but determined due to advanced age/frailty and limited mobility he would be best served with TAVR > reportedly declined. Awaiting cardiac catheterization.4/27: Seen by EP (Dr. Ash) who agreed with discontinuation of of beta-blocker and consideration for permanent pacemaker in future. 4/28: Losartan on hold. Underwent C with Dr. Cadena, showed severe disease of left main, heavily calcified ostial LAD stenosis, mild in-stent stenosis of mid LAD coronary artery,

## 2025-06-17 NOTE — PLAN OF CARE
Problem: Safety - Adult  Goal: Free from fall injury  6/17/2025 0210 by Susan Cottrell RN  Outcome: Progressing  6/16/2025 1338 by Bryan Frederick RN  Outcome: Progressing     Problem: Skin/Tissue Integrity  Goal: Skin integrity remains intact  Description: 1.  Monitor for areas of redness and/or skin breakdown  2.  Assess vascular access sites hourly  3.  Every 4-6 hours minimum:  Change oxygen saturation probe site  4.  Every 4-6 hours:  If on nasal continuous positive airway pressure, respiratory therapy assess nares and determine need for appliance change or resting period  6/17/2025 0210 by Susan Cottrell RN  Outcome: Progressing  6/16/2025 1338 by Bryan Frederick RN  Outcome: Progressing     Problem: Respiratory - Adult  Goal: Achieves optimal ventilation and oxygenation  6/17/2025 0210 by Susan Cottrell RN  Outcome: Progressing  6/16/2025 1338 by Bryan Frederick RN  Outcome: Progressing     Problem: Cardiovascular - Adult  Goal: Maintains optimal cardiac output and hemodynamic stability  6/17/2025 0210 by Susan Cottrell RN  Outcome: Progressing  6/16/2025 1338 by Bryan Frederick RN  Outcome: Progressing  Goal: Absence of cardiac dysrhythmias or at baseline  6/17/2025 0210 by Susan Cottrell RN  Outcome: Progressing  6/16/2025 1338 by Bryan Frederick RN  Outcome: Progressing     Problem: Discharge Planning  Goal: Discharge to home or other facility with appropriate resources  6/17/2025 0210 by Susan Cottrell RN  Outcome: Progressing  6/16/2025 1338 by Bryan Frederick RN  Outcome: Progressing     Problem: Chronic Conditions and Co-morbidities  Goal: Patient's chronic conditions and co-morbidity symptoms are monitored and maintained or improved  6/17/2025 0210 by Susan Cottrell RN  Outcome: Progressing  6/16/2025 1338 by Bryan Frederick RN  Outcome: Progressing     Problem: Pain  Goal: Verbalizes/displays adequate comfort level or baseline comfort

## 2025-06-18 LAB
ANION GAP SERPL CALCULATED.3IONS-SCNC: 10 MMOL/L (ref 7–16)
BUN SERPL-MCNC: 26 MG/DL (ref 8–23)
CALCIUM SERPL-MCNC: 8.5 MG/DL (ref 8.8–10.2)
CHLORIDE SERPL-SCNC: 103 MMOL/L (ref 98–107)
CO2 SERPL-SCNC: 25 MMOL/L (ref 22–29)
CREAT SERPL-MCNC: 1.4 MG/DL (ref 0.7–1.2)
GFR, ESTIMATED: 49 ML/MIN/1.73M2
GLUCOSE SERPL-MCNC: 123 MG/DL (ref 74–99)
MAGNESIUM SERPL-MCNC: 2.3 MG/DL (ref 1.6–2.4)
POTASSIUM SERPL-SCNC: 4 MMOL/L (ref 3.5–5.1)
SODIUM SERPL-SCNC: 138 MMOL/L (ref 136–145)

## 2025-06-18 PROCEDURE — 97535 SELF CARE MNGMENT TRAINING: CPT

## 2025-06-18 PROCEDURE — 6370000000 HC RX 637 (ALT 250 FOR IP): Performed by: INTERNAL MEDICINE

## 2025-06-18 PROCEDURE — G0378 HOSPITAL OBSERVATION PER HR: HCPCS

## 2025-06-18 PROCEDURE — 97165 OT EVAL LOW COMPLEX 30 MIN: CPT

## 2025-06-18 PROCEDURE — 99233 SBSQ HOSP IP/OBS HIGH 50: CPT | Performed by: INTERNAL MEDICINE

## 2025-06-18 PROCEDURE — 36415 COLL VENOUS BLD VENIPUNCTURE: CPT

## 2025-06-18 PROCEDURE — 2700000000 HC OXYGEN THERAPY PER DAY

## 2025-06-18 PROCEDURE — 83735 ASSAY OF MAGNESIUM: CPT

## 2025-06-18 PROCEDURE — 80048 BASIC METABOLIC PNL TOTAL CA: CPT

## 2025-06-18 PROCEDURE — 97530 THERAPEUTIC ACTIVITIES: CPT

## 2025-06-18 PROCEDURE — 2500000003 HC RX 250 WO HCPCS: Performed by: INTERNAL MEDICINE

## 2025-06-18 PROCEDURE — 97161 PT EVAL LOW COMPLEX 20 MIN: CPT

## 2025-06-18 PROCEDURE — 94667 MNPJ CHEST WALL 1ST: CPT

## 2025-06-18 RX ORDER — FUROSEMIDE 40 MG/1
40 TABLET ORAL DAILY
Qty: 60 TABLET | Refills: 0
Start: 2025-06-19

## 2025-06-18 RX ORDER — FUROSEMIDE 40 MG/1
20 TABLET ORAL DAILY
Qty: 60 TABLET | Refills: 3
Start: 2025-06-19 | End: 2025-06-18

## 2025-06-18 RX ORDER — POTASSIUM CHLORIDE 1500 MG/1
20 TABLET, EXTENDED RELEASE ORAL DAILY
Qty: 30 TABLET | Refills: 0
Start: 2025-06-18

## 2025-06-18 RX ORDER — METOPROLOL SUCCINATE 25 MG/1
25 TABLET, EXTENDED RELEASE ORAL 2 TIMES DAILY
Qty: 60 TABLET | Refills: 0 | Status: SHIPPED | OUTPATIENT
Start: 2025-06-18

## 2025-06-18 RX ADMIN — APIXABAN 2.5 MG: 2.5 TABLET, FILM COATED ORAL at 22:38

## 2025-06-18 RX ADMIN — ATORVASTATIN CALCIUM 40 MG: 40 TABLET, FILM COATED ORAL at 08:56

## 2025-06-18 RX ADMIN — SODIUM CHLORIDE, PRESERVATIVE FREE 10 ML: 5 INJECTION INTRAVENOUS at 22:38

## 2025-06-18 RX ADMIN — METOPROLOL SUCCINATE 25 MG: 25 TABLET, EXTENDED RELEASE ORAL at 22:38

## 2025-06-18 RX ADMIN — METOPROLOL SUCCINATE 25 MG: 25 TABLET, EXTENDED RELEASE ORAL at 08:56

## 2025-06-18 RX ADMIN — ACETAMINOPHEN 650 MG: 325 TABLET ORAL at 14:49

## 2025-06-18 RX ADMIN — FUROSEMIDE 40 MG: 20 TABLET ORAL at 08:56

## 2025-06-18 RX ADMIN — PANTOPRAZOLE SODIUM 40 MG: 40 TABLET, DELAYED RELEASE ORAL at 08:56

## 2025-06-18 RX ADMIN — SODIUM CHLORIDE, PRESERVATIVE FREE 10 ML: 5 INJECTION INTRAVENOUS at 08:56

## 2025-06-18 RX ADMIN — APIXABAN 2.5 MG: 2.5 TABLET, FILM COATED ORAL at 08:56

## 2025-06-18 ASSESSMENT — PAIN SCALES - GENERAL
PAINLEVEL_OUTOF10: 1
PAINLEVEL_OUTOF10: 4

## 2025-06-18 ASSESSMENT — PAIN DESCRIPTION - LOCATION: LOCATION: LEG

## 2025-06-18 ASSESSMENT — PAIN DESCRIPTION - ORIENTATION: ORIENTATION: LEFT

## 2025-06-18 ASSESSMENT — PAIN - FUNCTIONAL ASSESSMENT: PAIN_FUNCTIONAL_ASSESSMENT: PREVENTS OR INTERFERES SOME ACTIVE ACTIVITIES AND ADLS

## 2025-06-18 ASSESSMENT — PAIN DESCRIPTION - DESCRIPTORS: DESCRIPTORS: ACHING;DISCOMFORT;DULL

## 2025-06-18 NOTE — CARE COORDINATION
6/18/25, HAYDE Update-SW received choices from Vas the son-Choice 1-[facility], Choice 2-[facility] and Choice 3-[facility].  Referral sent to all 3 choices in [facility] noting the choices from 1-3.  SW reaching out to patient and son to find out patient's Social Security Number.  SW to follow.      Tammy Horowitz, [facility] Case Management  [phone]

## 2025-06-18 NOTE — CARE COORDINATION
6/18/25, Patient admitted for Acute Respiratory Failure With Hypoxemia.  SW met with patient in room.  Discussed transition of care-patient referred this worker to his son Jared.  Call placed to Vas and discussed patient having a discharge in.  Patient lives alone in a bi level home with 5 steps up and 5 steps down.  No DME has been used by patient due to him being able to be independent.  Son is concerned that patient will need some short rehab.  Call placed to therapy to see patient stat.  PCP is Dr. Garcia and J Luis is [facility] mail order.  Patient was recently at hospital and discharged to home with [facility].  KATE list was emailed to son Jared at [email or web address].  Informed Vas that 3 choices will be needed.  Will need choices and PT/OT evals to determine discharge plan.  Should patient need rehab a precert will be needed.  SW to follow.      Case Management Assessment  Initial Evaluation    Date/Time of Evaluation: 6/18/2025 9:35 AM  Assessment Completed by: KRYSTEN Harrell    If patient is discharged prior to next notation, then this note serves as note for discharge by case management.    Patient Name: Trace Nassar                   YOB: 1940  Diagnosis: Acute respiratory failure with hypoxemia (HCC) [J96.01]                   Date / Time: 6/16/2025  4:01 AM    Patient Admission Status: Observation   Readmission Risk (Low < 19, Mod (19-27), High > 27): No data recorded  Current PCP: Silvino Garcia, DO  PCP verified by CM? Yes    Chart Reviewed: Yes      History Provided by: Patient, Child/Family  Patient Orientation: Alert and Oriented    Patient Cognition: Alert    Hospitalization in the last 30 days (Readmission):  Yes    If yes, Readmission Assessment in CM Navigator will be completed.    Advance Directives:      Code Status: Full Code   Patient's Primary Decision Maker is: Named in Scanned ACP Document      Discharge Planning:    Patient lives with: Alone Type of Home:  House  Primary Care Giver: Other (Comment) (Patient had [facility].)  Patient Support Systems include: Children   Current Financial resources:    Current community resources:    Current services prior to admission: None            Current DME:              Type of Home Care services:  None    ADLS  Prior functional level: Independent in ADLs/IADLs, Assistance with the following:, Mobility  Current functional level: Independent in ADLs/IADLs, Assistance with the following:, Mobility    PT AM-PAC:   /24  OT AM-PAC:   /24    Family can provide assistance at DC: No  Would you like Case Management to discuss the discharge plan with any other family members/significant others, and if so, who? Yes (Son Vas)  Plans to Return to Present Housing: Other (see comment) (Awaiting PT/OT evals.)  Other Identified Issues/Barriers to RETURNING to current housing: N/A  Potential Assistance needed at discharge: N/A            Potential DME:    Patient expects to discharge to: House  Plan for transportation at discharge:      Financial    Payor: [employer] MEDICARE / Plan: AETNA MEDICARE-ADVANTAGE PPO / Product Type: Medicare /     Does insurance require precert for SNF: Yes    Potential assistance Purchasing Medications: No  Meds-to-Beds request: Yes    No Pharmacies Listed    Notes:    Factors facilitating achievement of predicted outcomes: Family support    Barriers to discharge: N/A    Additional Case Management Notes: See Above    The Plan for Transition of Care is related to the following treatment goals of Acute respiratory failure with hypoxemia (HCC) [J96.01]    IF APPLICABLE: The Patient and/or patient representative Trace and his family were provided with a choice of provider and agrees with the discharge plan. Freedom of choice list with basic dialogue that supports the patient's individualized plan of care/goals and shares the quality data associated with the providers was provided to:     Patient Representative Name:       The

## 2025-06-18 NOTE — DISCHARGE INSTR - COC
Continuity of Care Form    Patient Name: Trace Nassar   :  1940  MRN:  [id number]    Admit date:  2025  Discharge date:  2025    Code Status Order: Full Code   Advance Directives:     Admitting Physician:  Steven Nava DO  PCP: Silvino Garcia DO    Discharging Nurse: AB  Discharging Hospital Unit/Room#: 8211/8211-B  Discharging Unit Phone Number: [phone]    Emergency Contact:   Extended Emergency Contact Information  Primary Emergency Contact: Jared Nassar  Mobile Phone: [phone]  Relation: Child  Secondary Emergency Contact: Lisette Nassar  Mobile Phone: [phone]  Relation: Child    Past Surgical History:  History reviewed. No pertinent surgical history.    Immunization History:     There is no immunization history on file for this patient.    Active Problems:  Patient Active Problem List   Diagnosis Code    Acute respiratory failure with hypoxemia (HCC) J96.01    Acute on chronic systolic congestive heart failure (HCC) I50.23       Isolation/Infection:   Isolation            No Isolation          Patient Infection Status    None to display              Nurse Assessment:  Last Vital Signs: BP (!) 100/56   Pulse 62   Temp 98.1 °F (36.7 °C) (Oral)   Resp 20   Ht 1.626 m (5' 4\")   Wt 57.2 kg (126 lb 1.7 oz)   SpO2 94%   BMI 21.65 kg/m²     Last documented pain score (0-10 scale): Pain Level: 4  Last Weight:   Wt Readings from Last 1 Encounters:   25 57.2 kg (126 lb 1.7 oz)     Mental Status:  oriented and alert    IV Access:  - None    Nursing Mobility/ADLs:  Walking   Assisted  Transfer  Assisted  Bathing  Assisted  Dressing  Assisted  Toileting  Assisted  Feeding  Independent  Med Admin  Assisted  Med Delivery   whole    Wound Care Documentation and Therapy:  Wound 25 Coccyx Left (Active)   Dressing Status Clean;Dry;Intact 25 08   Wound Cleansed Cleansed with saline;Irrigated with saline 25 08   Dressing/Treatment Foam 25 08   Wound  Worker called Customer Service on card, ph [phone] and spoke to Jailene after several minutes of being on hold. Jailene stated that pt had no out of network benefits but then she began reading out loud info that seemed to contradict this statement. Finally, after several minutes she indicated that she needed to speak to her Supervisor. She put worker on hold for another several minutes and after 15 min the phone call hung up.     Worker reviewed previous charting and noted that ph no for [facility] group, [phone]  seems to be the means that auth previously obtained. I notice charting from Lady PEREIRA on 4/25 states that [facility] and they asked that clinicals be faxed to [phone] for auth. Currently the office is closed but anticipate opening in next few minutes therefore worker will also try to call there to verify whether they would do pre cert as Jailene (see above) said that she does auth.     Received a call back from Jailene reiterating that she talked to Supervisor and this pt does not have out of network benefits and therefore she anticipates it would not be covered but suggested we call the Physician group to see if they can help with placement options.    Spoke to Madison at [facility] (open 9-5 P Monday through Friday) who states that they can assist with pre cert if need out of network facility. Madison states that they offer ability to fax or they can start live review but will be only given a ref number and she adds that the ref no does not mean that there is a guarantee for payment.     Will continue to monitor and assess as needed.    Sydni Mcneal LCSW,[facility]  [phone]      Will continue to monitor and assess as needed.    Sydni Mcneal LCSW,[facility]  [phone]

## 2025-06-18 NOTE — PROGRESS NOTES
Physical Therapy    Physical Therapy Initial Assessment     Name: Trace Nassar  : 1940  MRN: [id number]      Date of Service: 2025    Evaluating PT:  Lupe Kruse PT, DPT [id number]    Room #:  8211/8211-B  Diagnosis:  Acute respiratory failure with hypoxemia (HCC) [J96.01]  PMHx/PSHx:   has no past medical history on file.  Procedure/Surgery:  None this admission  Precautions:  Fall risk, alarms, O2, external catheter  Equipment Needs:  TBD, FWW if to discharge home    SUBJECTIVE:    Pt lives alone in a bi-level home with 2 steps to enter and bilateral rail(s). There are 5 steps with bilateral rails to access pt's bedroom and bathroom. Pt ambulated with no device and was fully independent PTA. Pt reports typically being on RA at baseline.    OBJECTIVE:   Initial Evaluation  Date: 25 Treatment Date:  Short Term/ Long Term   Goals   AM-PAC 6 Clicks      Was pt agreeable to Eval/treatment? Yes     Does pt have pain? L foot pain (reported to be chronic)     Bed Mobility  Rolling: NT  Supine to sit: Eber  Sit to supine: NT  Scooting: Eber  Rolling: Independent  Supine to sit: Independent  Sit to supine: Independent  Scooting: Independent   Transfers Sit to stand: ModA  Stand to sit: ModA  Stand pivot: ModA with FWW  Sit to stand: Independent  Stand to sit: Independent  Stand pivot: Mod I with AAD   Ambulation    30 feet x2 with FWW and ModA  >400 feet with AAD and Mod I   Stair negotiation: ascended and descended  NT  >5 steps with bilateral rail and Mod I   ROM BUE:  Refer to OT  BLE:  WFL     Strength BUE:  Refer to OT  BLE:  5/5 knee extension, 5/5 ankle DF, 5/5 ankle PF     Balance Sitting EOB:  SBA  Dynamic Standing:  Eber <> ModA with FWW  Sitting EOB:  Independent  Dynamic Standing:  Mod I with AAD     Pt is A & O x 4  Sensation:  Pt denies numbness and tingling to extremities  Edema:  Unremarkable   Vitals:  SpO2: 93% on 2 L/min O2 (pre-activity)  SpO2: 88-93% on RA (in chair)  SpO2:

## 2025-06-18 NOTE — PROGRESS NOTES
CLINICAL PHARMACY NOTE: MEDS TO BEDS    Total # of Prescriptions Filled: 3   The following medications were delivered to the patient:  Clopidogrel 75mg  Entresto 24-26mg  Lasix 40mg    Additional Documentation:  Tech delivered to patient's son Vas 6/14/25

## 2025-06-18 NOTE — PROGRESS NOTES
Occupational Therapy  OCCUPATIONAL THERAPY INITIAL EVALUATION    [facility]   [address]        Date:2025                                                  Patient Name: Trace Nassar    MRN: [id number]    : 1940    Room: [facility]      Evaluating OT: Caitlin Avalos OTR/L [id number]       Referring Provider:Steven Nava DO     Specific Provider Orders/Date: OT eval and treat 25      Diagnosis: Acute Respiratory Failure with Hypoxemia     Surgery: none      Pertinent Medical History:     History reviewed. No pertinent past medical history.    History reviewed. No pertinent surgical history.   Precautions:  Fall Risk, O2    Assessment of current deficits    [x] Functional mobility  [x]ADLs  [x] Strength               []Cognition    [x] Functional transfers   [x] IADLs         [] Safety Awareness   [x]Endurance    [] Fine Coordination              [x] Balance      [] Vision/perception   []Sensation     []Gross Motor Coordination  [] ROM  [] Delirium                   [] Motor Control     OT PLAN OF CARE   OT POC based on physician orders, patient diagnosis and results of clinical assessment    Frequency/Duration 1-3 days/wk for 2 weeks PRN   Specific OT Treatment Interventions to include:   * Instruction/training on adapted ADL techniques and AE recommendations to increase functional independence within precautions       * Training on energy conservation strategies, correct breathing pattern and techniques to improve independence/tolerance for self-care routine  * Functional transfer/mobility training/DME recommendations for increased independence, safety, and fall prevention  * Patient/Family education to increase follow through with safety techniques and functional independence  * Recommendation of environmental modifications for increased safety with functional transfers/mobility and ADLs  * Therapeutic  current medical information, gathering information on past medical history/social history and prior level of function, interpretation of standardized testing/informal observation of tasks, assessment of data and development of plan of care and goals.            Caitlin Avalos OTR/L [id number]

## 2025-06-18 NOTE — PLAN OF CARE
Problem: Safety - Adult  Goal: Free from fall injury  6/17/2025 2328 by Hilary Ascencio RN  Outcome: Progressing  6/17/2025 0934 by Bryan Frederick RN  Outcome: Progressing     Problem: Skin/Tissue Integrity  Goal: Skin integrity remains intact  Description: 1.  Monitor for areas of redness and/or skin breakdown  2.  Assess vascular access sites hourly  3.  Every 4-6 hours minimum:  Change oxygen saturation probe site  4.  Every 4-6 hours:  If on nasal continuous positive airway pressure, respiratory therapy assess nares and determine need for appliance change or resting period  6/17/2025 2328 by Hilary Ascencio RN  Outcome: Progressing  6/17/2025 0934 by Bryan Frederick RN  Outcome: Progressing     Problem: Respiratory - Adult  Goal: Achieves optimal ventilation and oxygenation  6/17/2025 2328 by Hilary Ascencio RN  Outcome: Progressing  6/17/2025 0934 by Bryan Frederick RN  Outcome: Progressing     Problem: Cardiovascular - Adult  Goal: Maintains optimal cardiac output and hemodynamic stability  6/17/2025 2328 by Hilary Ascencio RN  Outcome: Progressing  6/17/2025 0934 by Bryan Frederick RN  Outcome: Progressing  Goal: Absence of cardiac dysrhythmias or at baseline  6/17/2025 2328 by Hilary Ascencio RN  Outcome: Progressing  6/17/2025 0934 by Bryan Frederick RN  Outcome: Progressing     Problem: Discharge Planning  Goal: Discharge to home or other facility with appropriate resources  6/17/2025 2328 by Hilary Ascencio RN  Outcome: Progressing  6/17/2025 0934 by Bryan Frederick RN  Outcome: Progressing     Problem: Chronic Conditions and Co-morbidities  Goal: Patient's chronic conditions and co-morbidity symptoms are monitored and maintained or improved  6/17/2025 2328 by Hilary Ascencio RN  Outcome: Progressing  6/17/2025 0934 by Bryan Frederick RN  Outcome: Progressing     Problem: Pain  Goal: Verbalizes/displays adequate

## 2025-06-18 NOTE — PROGRESS NOTES
PS sent to cardiology regarding clearance for discharge. Dr Lang to be over to round on patient shortly.

## 2025-06-18 NOTE — CARE COORDINATION
6/18/25, HAYDE Update-[facility] [place] no bed availability.  [facility]  [facility]  HAYDE to follow.      Tammy Horowitz, CLIFF  [facility] Case Management  [phone]

## 2025-06-18 NOTE — PROGRESS NOTES
[facility] Medicine    Subjective:  pt alert conversive      Current Facility-Administered Medications:     metoprolol succinate (TOPROL XL) extended release tablet 25 mg, 25 mg, Oral, BID, Kemi Lang DO, 25 mg at 06/17/25 2019    busPIRone (BUSPAR) tablet 5 mg, 5 mg, Oral, TID PRN, Steven Nava DO, 5 mg at 06/17/25 1357    atorvastatin (LIPITOR) tablet 40 mg, 40 mg, Oral, Daily, Steven Nava DO, 40 mg at 06/17/25 0805    apixaban (ELIQUIS) tablet 2.5 mg, 2.5 mg, Oral, BID, Steven Nava DO, 2.5 mg at 06/17/25 2019    furosemide (LASIX) tablet 40 mg, 40 mg, Oral, Daily, Steven Nava DO, 40 mg at 06/17/25 0805    pantoprazole (PROTONIX) tablet 40 mg, 40 mg, Oral, Daily, Steven Nava DO, 40 mg at 06/17/25 0806    [Held by provider] sacubitril-valsartan (ENTRESTO) 24-26 MG per tablet 1 tablet, 1 tablet, Oral, BID, Steven Nava DO, 1 tablet at 06/17/25 0806    sodium chloride flush 0.9 % injection 5-40 mL, 5-40 mL, IntraVENous, 2 times per day, Steven Nava DO, 10 mL at 06/17/25 2023    sodium chloride flush 0.9 % injection 5-40 mL, 5-40 mL, IntraVENous, PRN, Steven Nava DO    0.9 % sodium chloride infusion, , IntraVENous, PRN, Steven Nava DO    potassium chloride (KLOR-CON M) extended release tablet 40 mEq, 40 mEq, Oral, PRN **OR** potassium bicarb-citric acid (EFFER-K) effervescent tablet 40 mEq, 40 mEq, Oral, PRN **OR** potassium chloride 10 mEq/100 mL IVPB (Peripheral Line), 10 mEq, IntraVENous, PRN, Steven Nava DO    magnesium sulfate 2000 mg in 50 mL IVPB premix, 2,000 mg, IntraVENous, PRN, Malmer, Steven M, DO    polyethylene glycol (GLYCOLAX) packet 17 g, 17 g, Oral, Daily PRN, Steven Nava, DO    acetaminophen (TYLENOL) tablet 650 mg, 650 mg, Oral, Q6H PRN, 650 mg at 06/17/25 2017 **OR** acetaminophen (TYLENOL) suppository 650 mg, 650 mg, Rectal, Q6H PRN, Steven Nava, DO    prochlorperazine (COMPAZINE) tablet 10 mg, 10 mg, Oral, Q8H

## 2025-06-18 NOTE — PROGRESS NOTES
Joe Cordero M.D.,[facility]  Daniel Szymanski D.O., QUEENIE., [facility]  Janis Fleming M.D.  Emelia Munson M.D.   Carlos Hammonds D.O.  Steven Barillas M.D.         Daily Pulmonary Progress Note    Patient:  Trace Nassar 85 y.o. male MRN: [id number]            Synopsis     We are following patient for resolving pneumonia    \"CC\" altered mental status    Code status: FULL CODE      Subjective   6/16/25: The patient was seen and examined just coming to the floor from the ED, in no distress. Denies any difficultly breathing. SpO2 currently 99% on 3 liters. We weaned him down to 2 liters. He reports he was doing well and went shopping at [employer] without his oxygen and collapsed. He reports the leg pain and numbness he had last week has resolved     6/17/25: Patient was seen and examined lying in bed, son present at bedside. Patient doing well and voices no issues with his breathing. He is reporting pain again in his left foot.    6/18/25: patient seen and examined sitting up in chair. Denies any shortness of breath. Doing well. It appears TAVR for today has been placed on hold.       Review of Systems:  Constitutional: Denies fever, weight loss, night sweats, and fatigue  Skin: Denies pigmentation, dark lesions, and rashes   HEENT: Denies hearing loss, tinnitus, ear drainage, epistaxis, sore throat, and hoarseness.  Cardiovascular: Denies palpitations, chest pain, and chest pressure.  Respiratory: Denies cough, dyspnea at rest, hemoptysis, apnea, and choking.  Gastrointestinal: Denies nausea, vomiting, poor appetite, diarrhea, heartburn or reflux  Genitourinary: Denies dysuria, frequency, urgency or hematuria  Musculoskeletal: left foot pain  Neurological: Denies dizziness, vertigo, headache, and focal weakness  Psychological: Denies anxiety and depression  Endocrine: Denies heat intolerance and cold intolerance  Hematopoietic/Lymphatic: Denies bleeding problems and blood transfusions    24-hour

## 2025-06-18 NOTE — CARE COORDINATION
6/18/25, HAYDE Update-Precert is pending with insurance.  Auth is pending for patient to go to [place].  Son has been recently updated.  Ambulance form (will need filled out on day of discharge), face sheet, 7000 and envelope Is on soft chart.  SW to follow.      Tammy Horowitz CLIFF  [facility] Case Management  [phone]

## 2025-06-18 NOTE — PROGRESS NOTES
[facility] Cardiology Inpatient Progress Note    Patient is a 85 y.o. male of Silvino Garcia DO seen in hospital follow up.     Chief complaint: CHF/VHD/CAD/Change of MS    HPI: No CP or SOB.     Patient Active Problem List   Diagnosis    Acute respiratory failure with hypoxemia (HCC)    Acute on chronic systolic congestive heart failure (HCC)       No Known Allergies    Current Facility-Administered Medications   Medication Dose Route Frequency Provider Last Rate Last Admin    metoprolol succinate (TOPROL XL) extended release tablet 25 mg  25 mg Oral BID Kemi Lang DO   25 mg at 06/17/25 2019    busPIRone (BUSPAR) tablet 5 mg  5 mg Oral TID PRN Steven Nava DO   5 mg at 06/17/25 1357    atorvastatin (LIPITOR) tablet 40 mg  40 mg Oral Daily Steven Nava DO   40 mg at 06/17/25 0805    apixaban (ELIQUIS) tablet 2.5 mg  2.5 mg Oral BID Steven Nava DO   2.5 mg at 06/17/25 2019    furosemide (LASIX) tablet 40 mg  40 mg Oral Daily Steven Nava DO   40 mg at 06/17/25 0805    pantoprazole (PROTONIX) tablet 40 mg  40 mg Oral Daily Steven Nava DO   40 mg at 06/17/25 0806    [Held by provider] sacubitril-valsartan (ENTRESTO) 24-26 MG per tablet 1 tablet  1 tablet Oral BID Steven Nava DO   1 tablet at 06/17/25 0806    sodium chloride flush 0.9 % injection 5-40 mL  5-40 mL IntraVENous 2 times per day Steven Nava DO   10 mL at 06/17/25 2023    sodium chloride flush 0.9 % injection 5-40 mL  5-40 mL IntraVENous PRN Steven Nava DO        0.9 % sodium chloride infusion   IntraVENous PRN Steven Nava DO        potassium chloride (KLOR-CON M) extended release tablet 40 mEq  40 mEq Oral PRN Steven Nava DO        Or    potassium bicarb-citric acid (EFFER-K) effervescent tablet 40 mEq  40 mEq Oral PRN Steven Nava DO        Or    potassium chloride 10 mEq/100 mL IVPB (Peripheral Line)  10 mEq IntraVENous PRN Steven Nava DO        magnesium sulfate 2000 mg    Left Circumflex   First Obtuse Marginal Branch: The vessel is small.   Second Obtuse Marginal Branch: The vessel is small.   Third Obtuse Marginal Branch: The vessel is moderate in size. The vessel exhibits minimal luminal irregularities.   Left Posterior Descending Artery: The vessel is small. The vessel exhibits minimal luminal irregularities.   First Left Posterolateral Branch: The vessel is small.   Right Coronary Artery: The vessel is small. Dist RCA filled by collaterals from LPAV. Mid RCA to Dist RCA lesion, 100% stenosed.  Summary:   1.  Significant left main coronary artery disease with a deployment of 3.5, 30 mm Broseley frontier drug-eluting stent from ostial left main coronary artery into proximal LAD, there was stepup at the distal edge of the stent, that was treated using 2.5 x 12 mm Jacob frontier drug-eluting stent overlapped with the previously deployed stent  2.  Intracoronary shockwave balloon lithotripsy of left main coronary artery  3.  There was dissection in the ostial and proximal diagonal branch, with a KENNEDY-3 flow distally, treated conservatively with balloon dilation, was not stented in view of causing more damage to very heavily calcified LAD and its branches.    ASSESSMENT AND PLAN:  Patient Active Problem List   Diagnosis    Acute respiratory failure with hypoxemia (HCC)    Acute on chronic systolic congestive heart failure (HCC)     1. Acute on chronic CHF with mildly decreased LVEF/ICMP:     Chart/labs/EKG/monitor reviewed.     TTE 4/23/2025: EF 40-45%.Moderate basal septal thickening. Severe hypokinesis of the following segments: basal inferior, apical septal and apical inferior. Abnormal diastolic function.  RV mildly dilated, moderately reduced function. Aortic Valve: Classic low flow/low gradient severe aortic stenosis with low EF. AV mean gradient is 21 mmHg. AV peak velocity is 3.5 m/s. LVOT:AV VTI Index is 0.21. AV area by continuity VTI is 0.8 cm2. AV Stroke Volume index is 33.2

## 2025-06-19 VITALS
HEIGHT: 64 IN | DIASTOLIC BLOOD PRESSURE: 50 MMHG | RESPIRATION RATE: 16 BRPM | HEART RATE: 63 BPM | OXYGEN SATURATION: 99 % | WEIGHT: 126.1 LBS | SYSTOLIC BLOOD PRESSURE: 109 MMHG | TEMPERATURE: 97.7 F | BODY MASS INDEX: 21.53 KG/M2

## 2025-06-19 LAB
ANION GAP SERPL CALCULATED.3IONS-SCNC: 9 MMOL/L (ref 7–16)
BUN SERPL-MCNC: 30 MG/DL (ref 8–23)
CALCIUM SERPL-MCNC: 8.4 MG/DL (ref 8.8–10.2)
CHLORIDE SERPL-SCNC: 105 MMOL/L (ref 98–107)
CO2 SERPL-SCNC: 26 MMOL/L (ref 22–29)
CREAT SERPL-MCNC: 1.5 MG/DL (ref 0.7–1.2)
GFR, ESTIMATED: 47 ML/MIN/1.73M2
GLUCOSE SERPL-MCNC: 112 MG/DL (ref 74–99)
MAGNESIUM SERPL-MCNC: 2 MG/DL (ref 1.6–2.4)
POTASSIUM SERPL-SCNC: 3.9 MMOL/L (ref 3.5–5.1)
SODIUM SERPL-SCNC: 140 MMOL/L (ref 136–145)

## 2025-06-19 PROCEDURE — 6370000000 HC RX 637 (ALT 250 FOR IP): Performed by: INTERNAL MEDICINE

## 2025-06-19 PROCEDURE — 83735 ASSAY OF MAGNESIUM: CPT

## 2025-06-19 PROCEDURE — G0378 HOSPITAL OBSERVATION PER HR: HCPCS

## 2025-06-19 PROCEDURE — 80048 BASIC METABOLIC PNL TOTAL CA: CPT

## 2025-06-19 PROCEDURE — 2700000000 HC OXYGEN THERAPY PER DAY

## 2025-06-19 PROCEDURE — 36415 COLL VENOUS BLD VENIPUNCTURE: CPT

## 2025-06-19 RX ADMIN — SACUBITRIL AND VALSARTAN 1 TABLET: 24; 26 TABLET, FILM COATED ORAL at 08:20

## 2025-06-19 RX ADMIN — METOPROLOL SUCCINATE 25 MG: 25 TABLET, EXTENDED RELEASE ORAL at 08:20

## 2025-06-19 RX ADMIN — ATORVASTATIN CALCIUM 40 MG: 40 TABLET, FILM COATED ORAL at 08:20

## 2025-06-19 RX ADMIN — SACUBITRIL AND VALSARTAN 1 TABLET: 24; 26 TABLET, FILM COATED ORAL at 00:38

## 2025-06-19 RX ADMIN — APIXABAN 2.5 MG: 2.5 TABLET, FILM COATED ORAL at 08:20

## 2025-06-19 RX ADMIN — FUROSEMIDE 40 MG: 20 TABLET ORAL at 08:20

## 2025-06-19 RX ADMIN — PANTOPRAZOLE SODIUM 40 MG: 40 TABLET, DELAYED RELEASE ORAL at 08:20

## 2025-06-19 RX ADMIN — ACETAMINOPHEN 650 MG: 325 TABLET ORAL at 04:52

## 2025-06-19 ASSESSMENT — PAIN DESCRIPTION - LOCATION: LOCATION: FOOT

## 2025-06-19 NOTE — PLAN OF CARE
Problem: Safety - Adult  Goal: Free from fall injury  Outcome: Progressing     Problem: Skin/Tissue Integrity  Goal: Skin integrity remains intact  Description: 1.  Monitor for areas of redness and/or skin breakdown  2.  Assess vascular access sites hourly  3.  Every 4-6 hours minimum:  Change oxygen saturation probe site  4.  Every 4-6 hours:  If on nasal continuous positive airway pressure, respiratory therapy assess nares and determine need for appliance change or resting period  Outcome: Progressing     Problem: Respiratory - Adult  Goal: Achieves optimal ventilation and oxygenation  Outcome: Progressing     Problem: Cardiovascular - Adult  Goal: Maintains optimal cardiac output and hemodynamic stability  Outcome: Progressing  Goal: Absence of cardiac dysrhythmias or at baseline  Outcome: Progressing     Problem: Discharge Planning  Goal: Discharge to home or other facility with appropriate resources  Outcome: Progressing     Problem: Chronic Conditions and Co-morbidities  Goal: Patient's chronic conditions and co-morbidity symptoms are monitored and maintained or improved  Outcome: Progressing     Problem: Pain  Goal: Verbalizes/displays adequate comfort level or baseline comfort level  Outcome: Progressing

## 2025-06-19 NOTE — PROGRESS NOTES
[facility] Medicine    Subjective:  pt alert conversive no new complaints      Current Facility-Administered Medications:     metoprolol succinate (TOPROL XL) extended release tablet 25 mg, 25 mg, Oral, BID, Kemi Lang DO, 25 mg at 06/18/25 2238    busPIRone (BUSPAR) tablet 5 mg, 5 mg, Oral, TID PRN, Steven Nava DO, 5 mg at 06/17/25 1357    atorvastatin (LIPITOR) tablet 40 mg, 40 mg, Oral, Daily, Steven Nava DO, 40 mg at 06/18/25 0856    apixaban (ELIQUIS) tablet 2.5 mg, 2.5 mg, Oral, BID, Steven Nava DO, 2.5 mg at 06/18/25 2238    furosemide (LASIX) tablet 40 mg, 40 mg, Oral, Daily, Steven Nava DO, 40 mg at 06/18/25 0856    pantoprazole (PROTONIX) tablet 40 mg, 40 mg, Oral, Daily, Steven Nava DO, 40 mg at 06/18/25 0856    sacubitril-valsartan (ENTRESTO) 24-26 MG per tablet 1 tablet, 1 tablet, Oral, BID, Kemi Lang DO, 1 tablet at 06/19/25 0038    sodium chloride flush 0.9 % injection 5-40 mL, 5-40 mL, IntraVENous, 2 times per day, Steven Nava DO, 10 mL at 06/18/25 2238    sodium chloride flush 0.9 % injection 5-40 mL, 5-40 mL, IntraVENous, PRN, Steven Nava DO    0.9 % sodium chloride infusion, , IntraVENous, PRN, Steven Nava DO    potassium chloride (KLOR-CON M) extended release tablet 40 mEq, 40 mEq, Oral, PRN **OR** potassium bicarb-citric acid (EFFER-K) effervescent tablet 40 mEq, 40 mEq, Oral, PRN **OR** potassium chloride 10 mEq/100 mL IVPB (Peripheral Line), 10 mEq, IntraVENous, PRN, Steven Nava DO    magnesium sulfate 2000 mg in 50 mL IVPB premix, 2,000 mg, IntraVENous, PRN, Malmer, Steven M, DO    polyethylene glycol (GLYCOLAX) packet 17 g, 17 g, Oral, Daily PRN, Steven Nava, DO    acetaminophen (TYLENOL) tablet 650 mg, 650 mg, Oral, Q6H PRN, 650 mg at 06/19/25 0452 **OR** acetaminophen (TYLENOL) suppository 650 mg, 650 mg, Rectal, Q6H PRN, Steven Nava, DO    prochlorperazine (COMPAZINE) tablet 10 mg, 10 mg, Oral, Q8H PRN  **OR** prochlorperazine (COMPAZINE) injection 10 mg, 10 mg, IntraVENous, Q6H PRN, Steven Nava, DO    Objective:    BP (!) 126/56   Pulse 60   Temp 97.9 °F (36.6 °C) (Temporal)   Resp 16   Ht 1.626 m (5' 4\")   Wt 57.2 kg (126 lb 1.7 oz)   SpO2 98%   BMI 21.65 kg/m²     Heart:  irreg  Lungs:  ctab  Abd: + bs soft nontender  Extrem:  w/o edema    CBC with Differential:    Lab Results   Component Value Date/Time    WBC 4.4 06/17/2025 04:30 AM    RBC 3.32 06/17/2025 04:30 AM    HGB 8.8 06/17/2025 04:30 AM    HCT 30.6 06/17/2025 04:30 AM     06/17/2025 04:30 AM    MCV 92.2 06/17/2025 04:30 AM    MCH 26.5 06/17/2025 04:30 AM    MCHC 28.8 06/17/2025 04:30 AM    RDW 17.7 06/17/2025 04:30 AM    LYMPHOPCT 18 06/16/2025 04:17 AM    MONOPCT 12 06/16/2025 04:17 AM    EOSPCT 3 06/16/2025 04:17 AM    BASOPCT 1 06/16/2025 04:17 AM    MONOSABS 0.73 06/16/2025 04:17 AM    LYMPHSABS 1.07 06/16/2025 04:17 AM    EOSABS 0.19 06/16/2025 04:17 AM    BASOSABS 0.08 06/16/2025 04:17 AM     CMP:    Lab Results   Component Value Date/Time     06/18/2025 05:25 AM    K 4.0 06/18/2025 05:25 AM     06/18/2025 05:25 AM    CO2 25 06/18/2025 05:25 AM    BUN 26 06/18/2025 05:25 AM    CREATININE 1.4 06/18/2025 05:25 AM    LABGLOM 49 06/18/2025 05:25 AM    GLUCOSE 123 06/18/2025 05:25 AM    CALCIUM 8.5 06/18/2025 05:25 AM    BILITOT 1.5 06/16/2025 06:51 AM    ALKPHOS 78 06/16/2025 06:51 AM    AST 44 06/16/2025 06:51 AM    ALT 18 06/16/2025 06:51 AM     Warfarin PT/INR:  No results found for: \"INR\", \"PROTIME\"    Assessment:    Principal Problem:    Acute respiratory failure with hypoxemia (HCC)  Active Problems:    Acute on chronic systolic congestive heart failure (HCC)  Resolved Problems:    * No resolved hospital problems. *      Plan:  Dc to rehab        Steven Nava DO  7:03 AM  6/19/2025

## 2025-06-19 NOTE — CARE COORDINATION
6/19/25, SW contacted VAS (son) on patient discharging this am [facility].  VAS in agreement with patient discharging to facility.  SW to follow.      KRYSTEN Harrell  [facility] Case Management  [phone]

## 2025-06-19 NOTE — CARE COORDINATION
6/19/25, Discharge Acknowledged.  Precert has been obtained.  Patient will be going to [facility] today- with PAS is a 10 am time.  Those informed:  nursing, son of patient-VAS, patient and Pet[place]a at [facility] (through [facility]).  Ambulance form, face sheet, [phone] and envelope is on soft chart.  Updated Expand [facility] who was active with patient informing them through Care\[facility]\"" patient will be going to rehab.  SW to follow.      Tammy Horowitz CLIFF  [facility] Case Management  [phone]

## 2025-06-30 ENCOUNTER — TELEPHONE (OUTPATIENT)
Dept: OTHER | Age: 85
End: 2025-06-30

## 2025-06-30 NOTE — TELEPHONE ENCOUNTER
6:51 AM  Call received from pts son Kinjal needing to r/s today's CHF clinic appt d/t transportation issues. Reminded son of appt on 7/7 with TON. R/S pt for consult with RN staff at 7/22 @ 1:15 PM.

## 2025-07-02 RX ORDER — ACETAMINOPHEN 500 MG
500 TABLET ORAL EVERY 6 HOURS PRN
COMMUNITY

## 2025-07-02 RX ORDER — SODIUM PHOSPHATE, DIBASIC AND SODIUM PHOSPHATE, MONOBASIC 7; 19 G/230ML; G/230ML
1 ENEMA RECTAL
COMMUNITY

## 2025-07-02 NOTE — PROGRESS NOTES
[facility]   PRE-ADMISSION TESTING GENERAL INSTRUCTIONS  PAT Phone Number: [phone]      GENERAL INSTRUCTIONS:    [x] Hibiclens shower the night before AND the morning of surgery.   -Do not use Hibiclens on your face or head.  [x] Do not wear  lotions, powders, deodorant the morning of surgery.  [x] Nothing to eat or drink after midnight. This includes no gum, candy, mints or water.  [x] You may brush your teeth, gargle, but do NOT swallow water.   [x] No tobacco products, illegal drugs, or alcohol within 24 hours of your surgery.  [x] Jewelry or valuables should not be brought to the hospital. All body and/or tongue piercing's must be removed prior to arriving to hospital. No contact lens or hair pins.   [x] Arrange transportation with a responsible adult  to and from the hospital.   [x] Bring insurance card and photo ID.  [x] Bring copy of living will or healthcare power of  papers to be placed in your electronic record.  [x] Transfusion (Green) Bracelet: Please bring with you to hospital, day of surgery.     PARKING INSTRUCTIONS:     [x] ARRIVAL DATE & TIME:  WEDNESDAY 7/9  @  0930 AM     [x] Times are subject to change. We will contact you the business day before surgery if that were to occur.  [x] Enter into the [facility] Entrance. Two people may accompany you. Masks are not required.  [x] Parking Lot \"I\" is where you will park. It is located on the corner of [address] and [address]. The entrance is on [address].   Only one vehicle - per patient, is permitted in parking lot.   Upon entering the parking lot, a voucher ticket will print.    EDUCATION INSTRUCTIONS:        [x] Pain: Post-op pain is normal and to be expected. You will be asked to rate your pain from 0-10.    MEDICATION INSTRUCTIONS:    [x] Bring a complete list of your medications, please write the last time you took the medicine, give this list to the nurse in Pre-Op.    [x] Take

## 2025-07-03 ENCOUNTER — TELEPHONE (OUTPATIENT)
Age: 85
End: 2025-07-03

## 2025-07-07 ENCOUNTER — RESULTS FOLLOW-UP (OUTPATIENT)
Age: 85
End: 2025-07-07

## 2025-07-07 ENCOUNTER — OFFICE VISIT (OUTPATIENT)
Age: 85
End: 2025-07-07
Payer: MEDICARE

## 2025-07-07 ENCOUNTER — TELEPHONE (OUTPATIENT)
Age: 85
End: 2025-07-07

## 2025-07-07 VITALS
HEART RATE: 72 BPM | BODY MASS INDEX: 20.63 KG/M2 | WEIGHT: 120.2 LBS | RESPIRATION RATE: 16 BRPM | OXYGEN SATURATION: 93 % | DIASTOLIC BLOOD PRESSURE: 42 MMHG | SYSTOLIC BLOOD PRESSURE: 96 MMHG

## 2025-07-07 DIAGNOSIS — I25.10 CAD S/P PERCUTANEOUS CORONARY ANGIOPLASTY: ICD-10-CM

## 2025-07-07 DIAGNOSIS — I50.22 CHRONIC HFREF (HEART FAILURE WITH REDUCED EJECTION FRACTION) (HCC): Primary | ICD-10-CM

## 2025-07-07 DIAGNOSIS — I25.5 ISCHEMIC CARDIOMYOPATHY: ICD-10-CM

## 2025-07-07 DIAGNOSIS — Z98.61 CAD S/P PERCUTANEOUS CORONARY ANGIOPLASTY: ICD-10-CM

## 2025-07-07 LAB
ANION GAP SERPL CALCULATED.3IONS-SCNC: 12 MMOL/L (ref 7–16)
BUN BLDV-MCNC: 35 MG/DL (ref 8–23)
CALCIUM SERPL-MCNC: 8.8 MG/DL (ref 8.8–10.2)
CHLORIDE BLD-SCNC: 104 MMOL/L (ref 98–107)
CO2: 23 MMOL/L (ref 22–29)
CREAT SERPL-MCNC: 1.1 MG/DL (ref 0.7–1.2)
GFR, ESTIMATED: 67 ML/MIN/1.73M2
GLUCOSE BLD-MCNC: 124 MG/DL (ref 74–99)
NT PRO BNP: 7096 PG/ML (ref 0–450)
POTASSIUM SERPL-SCNC: 4.1 MMOL/L (ref 3.5–5.1)
SODIUM BLD-SCNC: 140 MMOL/L (ref 136–145)

## 2025-07-07 PROCEDURE — 36415 COLL VENOUS BLD VENIPUNCTURE: CPT | Performed by: PHYSICIAN ASSISTANT

## 2025-07-07 PROCEDURE — 1123F ACP DISCUSS/DSCN MKR DOCD: CPT | Performed by: PHYSICIAN ASSISTANT

## 2025-07-07 PROCEDURE — 99214 OFFICE O/P EST MOD 30 MIN: CPT | Performed by: PHYSICIAN ASSISTANT

## 2025-07-07 PROCEDURE — 1159F MED LIST DOCD IN RCRD: CPT | Performed by: PHYSICIAN ASSISTANT

## 2025-07-07 RX ORDER — METOPROLOL SUCCINATE 25 MG/1
25 TABLET, EXTENDED RELEASE ORAL DAILY
Qty: 30 TABLET | Refills: 5 | Status: SHIPPED | OUTPATIENT
Start: 2025-07-07

## 2025-07-07 RX ORDER — CLOPIDOGREL BISULFATE 75 MG/1
75 TABLET ORAL DAILY
Qty: 30 TABLET | Refills: 5 | Status: SHIPPED | OUTPATIENT
Start: 2025-07-07

## 2025-07-07 ASSESSMENT — ENCOUNTER SYMPTOMS
NAUSEA: 0
VOMITING: 0
COLOR CHANGE: 0
SHORTNESS OF BREATH: 1
ABDOMINAL DISTENTION: 0

## 2025-07-07 NOTE — RESULT ENCOUNTER NOTE
Labs reviewed    HFrEF    Current GDMT:  Lasix 40 mg p.o. daily  Toprol-XL 25 mg p.o. daily--reduced from twice daily at visit 7/7/2025 due to hypotension  Entresto 24/26 mg p.o. twice daily  Potassium chloride 20 mEq p.o. daily    NT proBNP 7096 compared to 12,163 on 6/16/2025 and 15,437 on 6/9/2025  BMP shows stable renal function and electrolytes compared to prior.  Creatinine 1.1 compared to 1.5 on 6/19/2025.  BUN elevated at 35 compared to 30 on 6/19/2025.  Potassium stable at 4.1.    1.  Continue with medication adjustments as made at visit today--Plavix 75 mg daily resumed, aspirin 81 mg daily discontinued, Toprol-XL decreased to 25 mg daily (from BID)  2.  Follow-up in CHF clinic on 7/22/2025 as scheduled

## 2025-07-07 NOTE — PROGRESS NOTES
daily (Patient not taking: Reported on 7/7/2025), Disp: 30 tablet, Rfl: 0    apixaban (ELIQUIS) 5 MG TABS tablet, Take 1 tablet by mouth 2 times daily (Patient not taking: Reported on 7/7/2025), Disp: 60 tablet, Rfl: 0    pantoprazole (PROTONIX) 40 MG tablet, Take 1 tablet by mouth every morning (before breakfast). (Patient not taking: Reported on 7/7/2025), Disp: 30 tablet, Rfl: 3         GUIDELINE DIRECTED MEDICAL THERAPY for HFrEF:  ARNI/ACE I/ARB: (1a indication)  Entresto 24/26 mg BID  Hydralazine/Nitrates (1a in symptomatic AA population, 2b if unable to tolerate ACE/ARB/ARNI)  No  Beta blocker: (1a indication)  Metoprolol Succinate (Toprol)  Aldosterone antagonist: (1a indication)  No  SGLT2i: (1a indication)  No    If Channel inhibitor (2a indication if HR >70 on maximally tolerated beta blocker)  No  Cardiac glycoside (2b indication for symptomatic HFrEF)  No  Soluble Guanylate Cyclase (sGC) Stimulator (2b indication LVEF <45% with recent HFH, IV diuretics or elevated BNP)  No  Potassium binders (2b indication for hyperkalemia while taking RAASi)  No        Review of Systems:   Review of Systems   Constitutional:  Positive for fatigue. Negative for fever.   HENT:  Negative for congestion.    Respiratory:  Positive for shortness of breath (with more moderate exertion).    Cardiovascular:  Negative for chest pain, palpitations and leg swelling.        No orthopnea or PND   Gastrointestinal:  Negative for abdominal distention, nausea and vomiting.   Genitourinary:  Negative for difficulty urinating.   Musculoskeletal:  Negative for arthralgias.   Skin:  Negative for color change.   Neurological:  Positive for dizziness (occasional with exertion). Negative for syncope.   Psychiatric/Behavioral:  Negative for agitation.            Weights:  Wt Readings from Last 3 Encounters:   07/07/25 54.5 kg (120 lb 3.2 oz)   06/18/25 57.2 kg (126 lb 1.7 oz)   06/14/25 59.3 kg (130 lb 12.8 oz)             Physical

## 2025-07-07 NOTE — PROGRESS NOTES
[facility]  Preoperative testing summary    Trace Nassar / 1940 (85 y.o.) / [id number]       1o DIAGNOSIS:Severe symptomatic aortic stenosis      PROCEDURE PLANNED:  TF TAVR  PROCEDURE DATE:  7/9/25  DEVICE PLANNED:  #23 vs #26 S3UR    CARE TEAM MEMBERS  1. PCP : Silvino Garcia DO   2. Primary Cardiologist : Dr. Layne    Procedure Type: Isolated AVR   Perioperative Outcome Estimate %   Operative Mortality 15.6%   Morbidity & Mortality 27.5%   Stroke 4.1%   Renal Failure 5.84%   Reoperation 6.04%   Prolonged Ventilation 16.4%   Deep Sternal Wound Infection 0.216%   Long Hospital Stay (>14 days) 19.5%   Short Hospital Stay (<6 days)* 9.54%       --Carotid US: 50-69% stenosis bilaterally (visually); no significant stenosis by flow velocities  --TTE: EF 40%, severe AS, trace AI  --PFT: FEV1 67% of predicted and DLCOunc 56% predicted  --Dental clearance: edentulous  --Insurance: approved (see referral by Elsa Valles)     Recent labs:  Cr: 1.2  GFR: 58  Hgb: 9.9  Plt: 226  WBC: 5.2  A1C: 5.7  INR: 1.5  MRSA nares:   Urine culture:     PERTINENT RADIOGRAPHIC/DIAGNOSTICS     TTE 4/23/25:    Left Ventricle: Mildly reduced left ventricular systolic function with a visually estimated EF of 40 - 45%. Left ventricle size is normal. Moderate basal septal thickening. Severe hypokinesis of the following segments: basal inferior, apical septal and apical inferior. Abnormal diastolic function.    Right Ventricle: Right ventricle is mildly dilated. Moderately reduced systolic function.    Aortic Valve: Classic low flow/low gradient severe aortic stenosis with low EF. AV mean gradient is 21 mmHg. AV peak velocity is 3.5 m/s. LVOT:AV VTI Index is 0.21. AV area by continuity VTI is 0.8 cm2. AV Stroke Volume index is 33.2 mL/m2.    Mitral Valve: Moderate regurgitation.    Tricuspid Valve: Moderate regurgitation.    Left Atrium: Left atrium is dilated.    Image quality is suboptimal. Contrast

## 2025-07-07 NOTE — TELEPHONE ENCOUNTER
----- Message from LYNDON Sales sent at 7/7/2025  1:38 PM EDT -----  Labs reviewed    HFrEF    Current GDMT:  Lasix 40 mg p.o. daily  Toprol-XL 25 mg p.o. daily--reduced from twice daily at visit 7/7/2025 due to hypotension  Entresto 24/26 mg p.o. twice daily  Potassium chloride 20 mEq p.o. daily    NT proBNP 7096 compared to 12,163 on 6/16/2025 and 15,437 on 6/9/2025  BMP shows stable renal function and electrolytes compared to prior.  Creatinine 1.1 compared to 1.5 on 6/19/2025.  BUN elevated at 35 compared to 30 on 6/19/2025.  Potassium stable at 4.1.    1.  Continue with medication adjustments as made at visit today--Plavix 75 mg daily resumed, aspirin 81 mg daily discontinued, Toprol-XL decreased to 25 mg daily (from BID)  2.  Follow-up in CHF clinic on 7/22/2025 as scheduled

## 2025-07-07 NOTE — TELEPHONE ENCOUNTER
Spoke with patient's son Vas Spoke with patient informing of instructions below per LYNDON Sales    Patient's son verbalized understanding.

## 2025-07-07 NOTE — PROGRESS NOTES
[facility]   PRE-ADMISSION TESTING GENERAL INSTRUCTIONS  PAT Phone Number: [phone]        GENERAL INSTRUCTIONS:     [x] Hibiclens shower the night before AND the morning of surgery.   -Do not use Hibiclens on your face or head.  [x] Do not wear  lotions, powders, deodorant the morning of surgery.  [x] Nothing to eat or drink after midnight. This includes no gum, candy, mints or water.  [x] You may brush your teeth, gargle, but do NOT swallow water.   [x] No tobacco products, illegal drugs, or alcohol within 24 hours of your surgery.  [x] Jewelry or valuables should not be brought to the hospital. All body and/or tongue piercing's must be removed prior to arriving to hospital. No contact lens or hair pins.   [x] Arrange transportation with a responsible adult  to and from the hospital.   [x] Bring insurance card and photo ID.  [x] Bring copy of living will or healthcare power of  papers to be placed in your electronic record.  [x] Transfusion (Green) Bracelet: Please bring with you to hospital, day of surgery.     PARKING INSTRUCTIONS:      [x] ARRIVAL DATE & TIME:  WEDNESDAY 7/9  @  0930 AM      [x] Times are subject to change. We will contact you the business day before surgery if that were to occur.  [x] Enter into the [facility] Entrance. Two people may accompany you. Masks are not required.  [x] Parking Lot \"I\" is where you will park. It is located on the corner of [address] and [address]. The entrance is on [address].   Only one vehicle - per patient, is permitted in parking lot.   Upon entering the parking lot, a voucher ticket will print.     EDUCATION INSTRUCTIONS:         [x] Pain: Post-op pain is normal and to be expected. You will be asked to rate your pain from 0-10.     MEDICATION INSTRUCTIONS:     [x] Bring a complete list of your medications, please write the last time you took the medicine, give this list to the nurse in Pre-Op.

## 2025-07-07 NOTE — PATIENT INSTRUCTIONS
Check labs in clinic today.  Will call with any new medication adjustments following review of blood work  Resume Plavix (clopidogrel) 75 mg p.o. daily for recent cardiac stent on 4/30/2025  Decrease Toprol XL to 25mg daily (from twice daily) due to low BP  Discontinue aspirin 81mg daily to avoid triple therapy as patient to be resumed on Plavix given recent PCI and also on OAC with Eliquis  Continue other cardiac meds the same  Please call if you need refills of any of your cardiac medications  Recommend 2000 mg daily sodium restriction  Check daily weights every morning  Notify CHF clinic with any rapid weight gain of 3 pounds or more in 1 to 2 days or 5 pounds in 1 week  Follow-up in CHF clinic on 7/22/2025 as scheduled  Follow-up with Dr. Layne in 2 months    -Weigh yourself daily    -Stay Hydrated, 64 oz fluid daily    -Diet should be sodium restricted to 2 grams    -Again watch your daily weight trends and if you gain water weight please follow below instructions.    -If you gain 3-5 pounds in 2-3 days OR notice that you are retaining fluid in anyway just like you did before then take an extra dose of your water pill (furosemide/Lasix) every day until you lose the weight or feel better.     -If you notice that you have taken more than 2 extra doses in 1 week then please call and let us know.    -If at any time you feel that you are retaining fluid, your medications are not working, or you feel ill in anyway, then please call us for either same day appointment or the next day, and for instructions. Our goal is to keep you out of the emergency room and the hospital and we have ways to do it. You just need to call us in a timely manner.     -If you become sick for other reasons, and notice that you are not urinating as much, the urine is very dark, you have significant diarrhea or vomiting, then please DO NOT take your water pill and CALL US immediately.

## 2025-07-07 NOTE — PROGRESS NOTES
Spoke with Lesley at [facility] regarding patient and requested lab results she states patent is no longer at the facility, she states patient was discharged home on 6/27.  The DON states patient was discharged early as patient wanted to go home.    8:52 am Informed ROSELINE Luther's office of the above.

## 2025-07-08 ENCOUNTER — HOSPITAL ENCOUNTER (OUTPATIENT)
Dept: PREADMISSION TESTING | Age: 85
Setting detail: SURGERY ADMIT
Discharge: HOME | DRG: 267 | End: 2025-07-08
Payer: MEDICARE

## 2025-07-08 ENCOUNTER — ANESTHESIA EVENT (OUTPATIENT)
Dept: OPERATING ROOM | Age: 85
DRG: 267 | End: 2025-07-08
Payer: MEDICARE

## 2025-07-08 VITALS
RESPIRATION RATE: 18 BRPM | OXYGEN SATURATION: 93 % | TEMPERATURE: 98.3 F | SYSTOLIC BLOOD PRESSURE: 122 MMHG | BODY MASS INDEX: 20.6 KG/M2 | DIASTOLIC BLOOD PRESSURE: 60 MMHG | WEIGHT: 120 LBS | HEART RATE: 60 BPM

## 2025-07-08 DIAGNOSIS — I35.0 NONRHEUMATIC AORTIC VALVE STENOSIS: ICD-10-CM

## 2025-07-08 LAB
ALBUMIN SERPL-MCNC: 3.1 G/DL (ref 3.5–5.2)
ALP SERPL-CCNC: 66 U/L (ref 40–129)
ALT SERPL-CCNC: 19 U/L (ref 0–50)
ANION GAP SERPL CALCULATED.3IONS-SCNC: 11 MMOL/L (ref 7–16)
AST SERPL-CCNC: 31 U/L (ref 0–50)
BACTERIA URNS QL MICRO: ABNORMAL
BILIRUB SERPL-MCNC: 0.7 MG/DL (ref 0–1.2)
BILIRUB UR QL STRIP: NEGATIVE
BUN SERPL-MCNC: 32 MG/DL (ref 8–23)
CALCIUM SERPL-MCNC: 9.1 MG/DL (ref 8.8–10.2)
CHLORIDE SERPL-SCNC: 106 MMOL/L (ref 98–107)
CLARITY UR: CLEAR
CO2 SERPL-SCNC: 24 MMOL/L (ref 22–29)
COLOR UR: YELLOW
CREAT SERPL-MCNC: 1.2 MG/DL (ref 0.7–1.2)
ERYTHROCYTE [DISTWIDTH] IN BLOOD BY AUTOMATED COUNT: 18.4 % (ref 11.5–15)
GFR, ESTIMATED: 58 ML/MIN/1.73M2
GLUCOSE SERPL-MCNC: 130 MG/DL (ref 74–99)
GLUCOSE UR STRIP-MCNC: NEGATIVE MG/DL
HBA1C MFR BLD: 5.7 % (ref 4–5.6)
HCT VFR BLD AUTO: 32.5 % (ref 37–54)
HGB BLD-MCNC: 9.9 G/DL (ref 12.5–16.5)
HGB UR QL STRIP.AUTO: NEGATIVE
INR PPP: 1.5
KETONES UR STRIP-MCNC: NEGATIVE MG/DL
LEUKOCYTE ESTERASE UR QL STRIP: NEGATIVE
MCH RBC QN AUTO: 26.7 PG (ref 26–35)
MCHC RBC AUTO-ENTMCNC: 30.5 G/DL (ref 32–34.5)
MCV RBC AUTO: 87.6 FL (ref 80–99.9)
NITRITE UR QL STRIP: NEGATIVE
PARTIAL THROMBOPLASTIN TIME: 37.9 SEC (ref 24.5–35.1)
PH UR STRIP: 5.5 [PH] (ref 5–8)
PLATELET # BLD AUTO: 226 K/UL (ref 130–450)
PMV BLD AUTO: 11.8 FL (ref 7–12)
POTASSIUM SERPL-SCNC: 4 MMOL/L (ref 3.5–5.1)
PROT SERPL-MCNC: 7.1 G/DL (ref 6.4–8.3)
PROT UR STRIP-MCNC: ABNORMAL MG/DL
PROTHROMBIN TIME: 17 SEC (ref 9.3–12.4)
RBC # BLD AUTO: 3.71 M/UL (ref 3.8–5.8)
RBC #/AREA URNS HPF: ABNORMAL /HPF
SODIUM SERPL-SCNC: 141 MMOL/L (ref 136–145)
SP GR UR STRIP: 1.01 (ref 1–1.03)
T4 FREE SERPL-MCNC: 1.7 NG/DL (ref 0.9–1.7)
TSH SERPL DL<=0.05 MIU/L-ACNC: 0.7 UIU/ML (ref 0.27–4.2)
UROBILINOGEN UR STRIP-ACNC: 1 EU/DL (ref 0–1)
WBC #/AREA URNS HPF: ABNORMAL /HPF
WBC OTHER # BLD: 5.2 K/UL (ref 4.5–11.5)

## 2025-07-08 PROCEDURE — 86900 BLOOD TYPING SEROLOGIC ABO: CPT

## 2025-07-08 PROCEDURE — 85027 COMPLETE CBC AUTOMATED: CPT

## 2025-07-08 PROCEDURE — 83036 HEMOGLOBIN GLYCOSYLATED A1C: CPT

## 2025-07-08 PROCEDURE — 85730 THROMBOPLASTIN TIME PARTIAL: CPT

## 2025-07-08 PROCEDURE — 84443 ASSAY THYROID STIM HORMONE: CPT

## 2025-07-08 PROCEDURE — 36415 COLL VENOUS BLD VENIPUNCTURE: CPT

## 2025-07-08 PROCEDURE — 81001 URINALYSIS AUTO W/SCOPE: CPT

## 2025-07-08 PROCEDURE — 85610 PROTHROMBIN TIME: CPT

## 2025-07-08 PROCEDURE — 87077 CULTURE AEROBIC IDENTIFY: CPT

## 2025-07-08 PROCEDURE — 84439 ASSAY OF FREE THYROXINE: CPT

## 2025-07-08 PROCEDURE — 86901 BLOOD TYPING SEROLOGIC RH(D): CPT

## 2025-07-08 PROCEDURE — 87081 CULTURE SCREEN ONLY: CPT

## 2025-07-08 PROCEDURE — 87086 URINE CULTURE/COLONY COUNT: CPT

## 2025-07-08 PROCEDURE — 86923 COMPATIBILITY TEST ELECTRIC: CPT

## 2025-07-08 PROCEDURE — 80053 COMPREHEN METABOLIC PANEL: CPT

## 2025-07-08 PROCEDURE — 86850 RBC ANTIBODY SCREEN: CPT

## 2025-07-08 ASSESSMENT — KANSAS CITY CARDIOMYOPATHY QUESTIONNAIRE (KCCQ12)
3. OVER THE PAST 2 WEEKS, ON AVERAGE, HOW MANY TIMES HAS FATIGUE LIMITED YOUR ABILITY TO DO WHAT YOU WANTED: AT LEAST ONCE A DAY
8B. OVER THE PAST 2 WEEKS, ON AVERAGE, HOW HAS HEART FAILURE LIMITED YOU ABILITY TO WORK OR DO HOUSEHOLD CHORES: SLIGHTLY LIMITED
5. OVER THE PAST 2 WEEKS, ON AVERAGE, HOW MANY TIMES HAVE YOU BEEN FORCED TO SLEEP SITTING UP IN A CHAIR OR WITH AT LEAST 3 PILLOWS TO PROP YOU UP BECAUSE OF SHORTNESS OF BREATH?: NEVER OVER THE PAST 2 WEEKS
1A. OVER THE PAST 2 WEEKS, HOW MUCH WERE YOU LIMITED BY HEART FAILURE SYMPTOMS (SHORTNESS OF BREATH OR FATIGUE) WHEN SHOWERING OR BATHING: NOT AT ALL LIMITED
7. IF YOU HAD TO SPEND THE REST OF YOUR LIFE WITH YOUR HEART FAILURE THE WAY IT IS RIGHT NOW, HOW WOULD YOU FEEL ABOUT THIS?: SOMEWHAT SATISFIED
8A. OVER THE PAST 2 WEEKS, ON AVERAGE, HOW HAS HEART FAILURE LIMITED YOU ABILITY TO DO HOBBIES OR RECREATIONAL ACTIVITIES: SLIGHTLY LIMITED
2. OVER THE PAST 2 WEEKS, HOW MANY TIMES DID YOU HAVE SWELLING IN YOUR FEET, ANKLES OR LEGS WHEN YOU WOKE UP IN THE MORNING: EVERY MORNING
6. OVER THE PAST 2 WEEKS, HOW MUCH HAS YOUR HEART FAILURE LIMITED YOUR ENJOYMENT OF LIFE: IT HAS MODERATELY LIMITED MY ENJOYMENT OF LIFE
1B. OVER THE PAST 2 WEEKS, HOW MUCH WERE YOU LIMITED BY HEART FAILURE SYMPTOMS (SHORTNESS OF BREATH OR FATIGUE) WHEN WALKING 1 BLOCK ON LEVEL GROUND: LIMITED FOR OTHER REASONS OR DID NOT DO THE ACTIVITY
1C. OVER THE PAST 2 WEEKS, HOW MUCH WERE YOU LIMITED BY HEART FAILURE SYMPTOMS (SHORTNESS OF BREATH OR FATIGUE) WHEN HURRYING OR JOGGING (AS IF TO CATCH A BUS): LIMITED FOR OTHER REASONS OR DID NOT DO THE ACTIVITY
8C. OVER THE PAST 2 WEEKS, ON AVERAGE, HOW HAS HEART FAILURE LIMITED YOU ABILITY TO VISIT FAMILY AND FRIENDS OUR OF YOUR HOME: DID NOT LIMIT AT ALL

## 2025-07-08 NOTE — H&P
STRUCTURAL CARDIOLOGY INPATIENT HISTORY AND PHYSICAL EXAMINATION  NOTE       PATIENT DEMOGRAPHICS:       NAME: Trace Nassar   YOB: 1940   AGE: 85 y.o.   MEDICAL RECORD NUMBER: [id number]           ADMISSION INFORMATION:  DATE OF ADMISSION: 7/9/2025     PRINCIPLE DIAGNOSIS: Severe aortic stenosis         CARE TEAM MEMBERS:   PCP : Silvino Garcia DO     PRIMARY CARDIOLOGIST:     REFERRING PROVIDER: Dr. Cadena           HISTORY OF PRESENT ILLNESS:    Trace Nassar is a 85 y.o. male referred by Dr. Cadena who presents today for planned Transcatheter Aortic Valve Replacement (TAVR). Past medical history of CAD s/p PCI LM/LAD, AFib, ICM, HTN, HLD, DM, CKD, CVA (2014), COPD, anemia, left CEA (2015), AAA s/p EVAR and AS       He was originally admitted in April 2025 with syncope. Cardiac work up included an echo that showed severe AS. He then had heart cath revealing severe disease but felt better served with PCI/TAVR given advanced age, frailty, limited mobility. He underwent PCI and TAVR CTA which revealed 6.9 cm AAA.     He underwent vascular surgery consultation and was felt best served with EVAR prior to TAVR. EVAR was performed on 5/16/25. He was scheduled for TAVR in early June but procedure was postponed when he was readmitted with pneumonia and acute on chronic CHF. He was eventually discharged to a rehab facility. Presents today to discuss procedure and assess his rehab progress prior to TAVR.      TTE 4/23/25:    Left Ventricle: Mildly reduced left ventricular systolic function with a visually estimated EF of 40 - 45%. Left ventricle size is normal. Moderate basal septal thickening. Severe hypokinesis of the following segments: basal inferior, apical septal and apical inferior. Abnormal diastolic function.    Right Ventricle: Right ventricle is mildly dilated. Moderately reduced systolic function.    Aortic Valve: Classic low flow/low gradient severe aortic stenosis

## 2025-07-09 ENCOUNTER — APPOINTMENT (OUTPATIENT)
Age: 85
DRG: 267 | End: 2025-07-09
Attending: INTERNAL MEDICINE
Payer: MEDICARE

## 2025-07-09 ENCOUNTER — HOSPITAL ENCOUNTER (INPATIENT)
Age: 85
LOS: 1 days | Discharge: HOME | DRG: 267 | End: 2025-07-10
Attending: INTERNAL MEDICINE | Admitting: INTERNAL MEDICINE
Payer: MEDICARE

## 2025-07-09 ENCOUNTER — ANESTHESIA (OUTPATIENT)
Dept: OPERATING ROOM | Age: 85
DRG: 267 | End: 2025-07-09
Payer: MEDICARE

## 2025-07-09 DIAGNOSIS — I35.0 NONRHEUMATIC AORTIC VALVE STENOSIS: ICD-10-CM

## 2025-07-09 DIAGNOSIS — I35.0 NODULAR CALCIFIC AORTIC VALVE STENOSIS: ICD-10-CM

## 2025-07-09 DIAGNOSIS — Z95.2 HISTORY OF TRANSCATHETER AORTIC VALVE REPLACEMENT (TAVR): ICD-10-CM

## 2025-07-09 DIAGNOSIS — Z01.812 PRE-OPERATIVE LABORATORY EXAMINATION: Primary | ICD-10-CM

## 2025-07-09 LAB
ANION GAP SERPL CALCULATED.3IONS-SCNC: 13 MMOL/L (ref 7–16)
BUN SERPL-MCNC: 27 MG/DL (ref 8–23)
CALCIUM SERPL-MCNC: 8.3 MG/DL (ref 8.8–10.2)
CHLORIDE SERPL-SCNC: 109 MMOL/L (ref 98–107)
CO2 SERPL-SCNC: 21 MMOL/L (ref 22–29)
CREAT SERPL-MCNC: 0.9 MG/DL (ref 0.7–1.2)
ECHO AV AREA PEAK VELOCITY: 1.8 CM2
ECHO AV AREA VTI: 1.5 CM2
ECHO AV MEAN GRADIENT: 3 MMHG
ECHO AV MEAN VELOCITY: 0.8 M/S
ECHO AV PEAK GRADIENT: 9 MMHG
ECHO AV PEAK VELOCITY: 1.5 M/S
ECHO AV VELOCITY RATIO: 0.47
ECHO AV VTI: 35.2 CM
ECHO LV EF PHYSICIAN: 44 %
ECHO LVOT AREA: 3.8 CM2
ECHO LVOT AV VTI INDEX: 0.41
ECHO LVOT DIAM: 2.2 CM
ECHO LVOT MEAN GRADIENT: 1 MMHG
ECHO LVOT PEAK GRADIENT: 2 MMHG
ECHO LVOT PEAK VELOCITY: 0.7 M/S
ECHO LVOT SV: 54.7 ML
ECHO LVOT VTI: 14.4 CM
ERYTHROCYTE [DISTWIDTH] IN BLOOD BY AUTOMATED COUNT: 17.9 % (ref 11.5–15)
GFR, ESTIMATED: 85 ML/MIN/1.73M2
GLUCOSE BLD-MCNC: 121 MG/DL (ref 74–99)
GLUCOSE SERPL-MCNC: 111 MG/DL (ref 74–99)
HCT VFR BLD AUTO: 27.6 % (ref 37–54)
HGB BLD-MCNC: 8.4 G/DL (ref 12.5–16.5)
INR PPP: 1.6
MAGNESIUM SERPL-MCNC: 1.7 MG/DL (ref 1.6–2.4)
MCH RBC QN AUTO: 26.5 PG (ref 26–35)
MCHC RBC AUTO-ENTMCNC: 30.4 G/DL (ref 32–34.5)
MCV RBC AUTO: 87.1 FL (ref 80–99.9)
MICROORGANISM SPEC CULT: ABNORMAL
PLATELET # BLD AUTO: 135 K/UL (ref 130–450)
PMV BLD AUTO: 11.5 FL (ref 7–12)
POTASSIUM SERPL-SCNC: 3.6 MMOL/L (ref 3.5–5.1)
POTASSIUM SERPL-SCNC: 4 MMOL/L (ref 3.5–5.1)
PROTHROMBIN TIME: 17.9 SEC (ref 9.3–12.4)
RBC # BLD AUTO: 3.17 M/UL (ref 3.8–5.8)
SODIUM SERPL-SCNC: 142 MMOL/L (ref 136–145)
SPECIMEN DESCRIPTION: ABNORMAL
WBC OTHER # BLD: 4.7 K/UL (ref 4.5–11.5)

## 2025-07-09 PROCEDURE — 80048 BASIC METABOLIC PNL TOTAL CA: CPT

## 2025-07-09 PROCEDURE — 5A1223Z PERFORMANCE OF CARDIAC PACING, CONTINUOUS: ICD-10-PCS | Performed by: THORACIC SURGERY (CARDIOTHORACIC VASCULAR SURGERY)

## 2025-07-09 PROCEDURE — 33361 REPLACE AORTIC VALVE PERQ: CPT | Performed by: INTERNAL MEDICINE

## 2025-07-09 PROCEDURE — 7100000000 HC PACU RECOVERY - FIRST 15 MIN

## 2025-07-09 PROCEDURE — 7100000001 HC PACU RECOVERY - ADDTL 15 MIN

## 2025-07-09 PROCEDURE — 37799 UNLISTED PX VASCULAR SURGERY: CPT

## 2025-07-09 PROCEDURE — C1760 CLOSURE DEV, VASC: HCPCS | Performed by: INTERNAL MEDICINE

## 2025-07-09 PROCEDURE — 2140000000 HC CCU INTERMEDIATE R&B

## 2025-07-09 PROCEDURE — 6360000002 HC RX W HCPCS

## 2025-07-09 PROCEDURE — 85610 PROTHROMBIN TIME: CPT

## 2025-07-09 PROCEDURE — 6370000000 HC RX 637 (ALT 250 FOR IP): Performed by: PHYSICIAN ASSISTANT

## 2025-07-09 PROCEDURE — 84132 ASSAY OF SERUM POTASSIUM: CPT

## 2025-07-09 PROCEDURE — C1769 GUIDE WIRE: HCPCS | Performed by: INTERNAL MEDICINE

## 2025-07-09 PROCEDURE — 6370000000 HC RX 637 (ALT 250 FOR IP): Performed by: INTERNAL MEDICINE

## 2025-07-09 PROCEDURE — 3700000001 HC ADD 15 MINUTES (ANESTHESIA): Performed by: INTERNAL MEDICINE

## 2025-07-09 PROCEDURE — B3101ZZ FLUOROSCOPY OF THORACIC AORTA USING LOW OSMOLAR CONTRAST: ICD-10-PCS | Performed by: THORACIC SURGERY (CARDIOTHORACIC VASCULAR SURGERY)

## 2025-07-09 PROCEDURE — 2700000000 HC OXYGEN THERAPY PER DAY

## 2025-07-09 PROCEDURE — 94640 AIRWAY INHALATION TREATMENT: CPT

## 2025-07-09 PROCEDURE — 85027 COMPLETE CBC AUTOMATED: CPT

## 2025-07-09 PROCEDURE — 03HY32Z INSERTION OF MONITORING DEVICE INTO UPPER ARTERY, PERCUTANEOUS APPROACH: ICD-10-PCS | Performed by: INTERNAL MEDICINE

## 2025-07-09 PROCEDURE — C1889 IMPLANT/INSERT DEVICE, NOC: HCPCS | Performed by: INTERNAL MEDICINE

## 2025-07-09 PROCEDURE — 93325 DOPPLER ECHO COLOR FLOW MAPG: CPT

## 2025-07-09 PROCEDURE — C1894 INTRO/SHEATH, NON-LASER: HCPCS | Performed by: INTERNAL MEDICINE

## 2025-07-09 PROCEDURE — 93308 TTE F-UP OR LMTD: CPT

## 2025-07-09 PROCEDURE — 83735 ASSAY OF MAGNESIUM: CPT

## 2025-07-09 PROCEDURE — 2500000003 HC RX 250 WO HCPCS

## 2025-07-09 PROCEDURE — 3600000007 HC SURGERY HYBRID BASE: Performed by: INTERNAL MEDICINE

## 2025-07-09 PROCEDURE — 2500000003 HC RX 250 WO HCPCS: Performed by: PHYSICIAN ASSISTANT

## 2025-07-09 PROCEDURE — 93325 DOPPLER ECHO COLOR FLOW MAPG: CPT | Performed by: INTERNAL MEDICINE

## 2025-07-09 PROCEDURE — 6360000002 HC RX W HCPCS: Performed by: INTERNAL MEDICINE

## 2025-07-09 PROCEDURE — 93308 TTE F-UP OR LMTD: CPT | Performed by: INTERNAL MEDICINE

## 2025-07-09 PROCEDURE — 2720000010 HC SURG SUPPLY STERILE: Performed by: INTERNAL MEDICINE

## 2025-07-09 PROCEDURE — 82962 GLUCOSE BLOOD TEST: CPT

## 2025-07-09 PROCEDURE — 85347 COAGULATION TIME ACTIVATED: CPT

## 2025-07-09 PROCEDURE — 02RF38Z REPLACEMENT OF AORTIC VALVE WITH ZOOPLASTIC TISSUE, PERCUTANEOUS APPROACH: ICD-10-PCS | Performed by: THORACIC SURGERY (CARDIOTHORACIC VASCULAR SURGERY)

## 2025-07-09 PROCEDURE — 3700000000 HC ANESTHESIA ATTENDED CARE: Performed by: INTERNAL MEDICINE

## 2025-07-09 PROCEDURE — 2709999900 HC NON-CHARGEABLE SUPPLY: Performed by: INTERNAL MEDICINE

## 2025-07-09 PROCEDURE — 93005 ELECTROCARDIOGRAM TRACING: CPT | Performed by: PHYSICIAN ASSISTANT

## 2025-07-09 PROCEDURE — 93321 DOPPLER ECHO F-UP/LMTD STD: CPT | Performed by: INTERNAL MEDICINE

## 2025-07-09 PROCEDURE — 2580000003 HC RX 258: Performed by: PHYSICIAN ASSISTANT

## 2025-07-09 PROCEDURE — 2580000003 HC RX 258

## 2025-07-09 PROCEDURE — 6360000002 HC RX W HCPCS: Performed by: PHYSICIAN ASSISTANT

## 2025-07-09 PROCEDURE — 3600000017 HC SURGERY HYBRID ADDL 15MIN: Performed by: INTERNAL MEDICINE

## 2025-07-09 PROCEDURE — 6360000004 HC RX CONTRAST MEDICATION: Performed by: INTERNAL MEDICINE

## 2025-07-09 PROCEDURE — 2500000003 HC RX 250 WO HCPCS: Performed by: INTERNAL MEDICINE

## 2025-07-09 PROCEDURE — 33361 REPLACE AORTIC VALVE PERQ: CPT | Performed by: THORACIC SURGERY (CARDIOTHORACIC VASCULAR SURGERY)

## 2025-07-09 DEVICE — VALVE AORT DIA23 MM TRANSCATHETER ADV CALCIUM BLK TECHNOLOGY: Type: IMPLANTABLE DEVICE | Site: AORTIC VALVE | Status: FUNCTIONAL

## 2025-07-09 DEVICE — SAPIEN 3 ULTRA RESILIA TRANSCATHETER HEART VALVE, 23MM
Type: IMPLANTABLE DEVICE | Site: AORTIC VALVE | Status: FUNCTIONAL
Brand: SAPIEN 3 ULTRA RESILIA

## 2025-07-09 RX ORDER — ARFORMOTEROL TARTRATE 15 UG/2ML
15 SOLUTION RESPIRATORY (INHALATION)
Status: DISCONTINUED | OUTPATIENT
Start: 2025-07-09 | End: 2025-07-10 | Stop reason: HOSPADM

## 2025-07-09 RX ORDER — VASOPRESSIN 20 [USP'U]/ML
INJECTION, SOLUTION INTRAVENOUS
Status: DISCONTINUED | OUTPATIENT
Start: 2025-07-09 | End: 2025-07-10 | Stop reason: SDUPTHER

## 2025-07-09 RX ORDER — SODIUM CHLORIDE 9 MG/ML
INJECTION, SOLUTION INTRAVENOUS
Status: DISCONTINUED | OUTPATIENT
Start: 2025-07-09 | End: 2025-07-10 | Stop reason: SDUPTHER

## 2025-07-09 RX ORDER — ONDANSETRON 4 MG/1
4 TABLET, ORALLY DISINTEGRATING ORAL EVERY 8 HOURS PRN
Status: DISCONTINUED | OUTPATIENT
Start: 2025-07-09 | End: 2025-07-09

## 2025-07-09 RX ORDER — LIDOCAINE HYDROCHLORIDE 10 MG/ML
INJECTION, SOLUTION EPIDURAL; INFILTRATION; INTRACAUDAL; PERINEURAL PRN
Status: DISCONTINUED | OUTPATIENT
Start: 2025-07-09 | End: 2025-07-09 | Stop reason: ALTCHOICE

## 2025-07-09 RX ORDER — DEXMEDETOMIDINE HYDROCHLORIDE 100 UG/ML
INJECTION, SOLUTION INTRAVENOUS
Status: DISCONTINUED | OUTPATIENT
Start: 2025-07-09 | End: 2025-07-10 | Stop reason: SDUPTHER

## 2025-07-09 RX ORDER — SODIUM CHLORIDE 0.9 % (FLUSH) 0.9 %
5-40 SYRINGE (ML) INJECTION EVERY 12 HOURS SCHEDULED
Status: DISCONTINUED | OUTPATIENT
Start: 2025-07-09 | End: 2025-07-10 | Stop reason: HOSPADM

## 2025-07-09 RX ORDER — IOPAMIDOL 755 MG/ML
INJECTION, SOLUTION INTRAVASCULAR PRN
Status: DISCONTINUED | OUTPATIENT
Start: 2025-07-09 | End: 2025-07-09 | Stop reason: HOSPADM

## 2025-07-09 RX ORDER — SODIUM CHLORIDE 9 MG/ML
INJECTION, SOLUTION INTRAVENOUS PRN
Status: DISCONTINUED | OUTPATIENT
Start: 2025-07-09 | End: 2025-07-09 | Stop reason: HOSPADM

## 2025-07-09 RX ORDER — SODIUM CHLORIDE 9 MG/ML
INJECTION, SOLUTION INTRAVENOUS CONTINUOUS
Status: DISCONTINUED | OUTPATIENT
Start: 2025-07-09 | End: 2025-07-09 | Stop reason: HOSPADM

## 2025-07-09 RX ORDER — METOPROLOL SUCCINATE 25 MG/1
25 TABLET, EXTENDED RELEASE ORAL DAILY
Status: DISCONTINUED | OUTPATIENT
Start: 2025-07-09 | End: 2025-07-10 | Stop reason: HOSPADM

## 2025-07-09 RX ORDER — DEXTROSE MONOHYDRATE 100 MG/ML
INJECTION, SOLUTION INTRAVENOUS CONTINUOUS PRN
Status: DISCONTINUED | OUTPATIENT
Start: 2025-07-09 | End: 2025-07-10 | Stop reason: HOSPADM

## 2025-07-09 RX ORDER — SODIUM CHLORIDE 0.9 % (FLUSH) 0.9 %
5-40 SYRINGE (ML) INJECTION EVERY 12 HOURS SCHEDULED
Status: DISCONTINUED | OUTPATIENT
Start: 2025-07-09 | End: 2025-07-09 | Stop reason: HOSPADM

## 2025-07-09 RX ORDER — ACETAMINOPHEN 500 MG
500 TABLET ORAL EVERY 6 HOURS PRN
Status: DISCONTINUED | OUTPATIENT
Start: 2025-07-09 | End: 2025-07-10 | Stop reason: HOSPADM

## 2025-07-09 RX ORDER — SODIUM CHLORIDE 0.9 % (FLUSH) 0.9 %
5-40 SYRINGE (ML) INJECTION PRN
Status: DISCONTINUED | OUTPATIENT
Start: 2025-07-09 | End: 2025-07-10 | Stop reason: HOSPADM

## 2025-07-09 RX ORDER — MUPIROCIN 2 %
OINTMENT (GRAM) TOPICAL ONCE
Status: COMPLETED | OUTPATIENT
Start: 2025-07-09 | End: 2025-07-09

## 2025-07-09 RX ORDER — GLUCAGON 1 MG/ML
1 KIT INJECTION PRN
Status: DISCONTINUED | OUTPATIENT
Start: 2025-07-09 | End: 2025-07-10 | Stop reason: HOSPADM

## 2025-07-09 RX ORDER — OXYCODONE HYDROCHLORIDE 10 MG/1
10 TABLET ORAL EVERY 4 HOURS PRN
Status: DISCONTINUED | OUTPATIENT
Start: 2025-07-09 | End: 2025-07-10 | Stop reason: HOSPADM

## 2025-07-09 RX ORDER — CEFAZOLIN SODIUM 1 G/3ML
INJECTION, POWDER, FOR SOLUTION INTRAMUSCULAR; INTRAVENOUS
Status: DISCONTINUED | OUTPATIENT
Start: 2025-07-09 | End: 2025-07-10 | Stop reason: SDUPTHER

## 2025-07-09 RX ORDER — SODIUM CHLORIDE 0.9 % (FLUSH) 0.9 %
5-40 SYRINGE (ML) INJECTION PRN
Status: DISCONTINUED | OUTPATIENT
Start: 2025-07-09 | End: 2025-07-09 | Stop reason: HOSPADM

## 2025-07-09 RX ORDER — PROTAMINE SULFATE 10 MG/ML
INJECTION, SOLUTION INTRAVENOUS
Status: DISCONTINUED | OUTPATIENT
Start: 2025-07-09 | End: 2025-07-10 | Stop reason: SDUPTHER

## 2025-07-09 RX ORDER — LIDOCAINE HYDROCHLORIDE 20 MG/ML
INJECTION, SOLUTION EPIDURAL; INFILTRATION; INTRACAUDAL; PERINEURAL
Status: DISCONTINUED | OUTPATIENT
Start: 2025-07-09 | End: 2025-07-10 | Stop reason: SDUPTHER

## 2025-07-09 RX ORDER — CLOPIDOGREL BISULFATE 75 MG/1
75 TABLET ORAL DAILY
Status: DISCONTINUED | OUTPATIENT
Start: 2025-07-10 | End: 2025-07-10 | Stop reason: HOSPADM

## 2025-07-09 RX ORDER — VITAMIN B COMPLEX
1000 TABLET ORAL DAILY
Status: DISCONTINUED | OUTPATIENT
Start: 2025-07-09 | End: 2025-07-10 | Stop reason: HOSPADM

## 2025-07-09 RX ORDER — GLYCOPYRROLATE 0.2 MG/ML
INJECTION INTRAMUSCULAR; INTRAVENOUS
Status: DISCONTINUED | OUTPATIENT
Start: 2025-07-09 | End: 2025-07-10 | Stop reason: SDUPTHER

## 2025-07-09 RX ORDER — ONDANSETRON 2 MG/ML
INJECTION INTRAMUSCULAR; INTRAVENOUS
Status: DISCONTINUED | OUTPATIENT
Start: 2025-07-09 | End: 2025-07-10 | Stop reason: SDUPTHER

## 2025-07-09 RX ORDER — FUROSEMIDE 20 MG/1
40 TABLET ORAL DAILY
Status: DISCONTINUED | OUTPATIENT
Start: 2025-07-09 | End: 2025-07-10 | Stop reason: HOSPADM

## 2025-07-09 RX ORDER — PANTOPRAZOLE SODIUM 40 MG/1
40 TABLET, DELAYED RELEASE ORAL
Status: DISCONTINUED | OUTPATIENT
Start: 2025-07-10 | End: 2025-07-10 | Stop reason: HOSPADM

## 2025-07-09 RX ORDER — POTASSIUM CHLORIDE 1500 MG/1
20 TABLET, EXTENDED RELEASE ORAL DAILY
Status: DISCONTINUED | OUTPATIENT
Start: 2025-07-09 | End: 2025-07-10 | Stop reason: HOSPADM

## 2025-07-09 RX ORDER — HEPARIN SODIUM 1000 [USP'U]/ML
INJECTION, SOLUTION INTRAVENOUS; SUBCUTANEOUS PRN
Status: DISCONTINUED | OUTPATIENT
Start: 2025-07-09 | End: 2025-07-09 | Stop reason: HOSPADM

## 2025-07-09 RX ORDER — ONDANSETRON 2 MG/ML
4 INJECTION INTRAMUSCULAR; INTRAVENOUS EVERY 6 HOURS PRN
Status: DISCONTINUED | OUTPATIENT
Start: 2025-07-09 | End: 2025-07-09

## 2025-07-09 RX ORDER — MUPIROCIN 2 %
OINTMENT (GRAM) TOPICAL 2 TIMES DAILY
Status: DISCONTINUED | OUTPATIENT
Start: 2025-07-09 | End: 2025-07-10 | Stop reason: HOSPADM

## 2025-07-09 RX ORDER — ATORVASTATIN CALCIUM 20 MG/1
20 TABLET, FILM COATED ORAL DAILY
Status: DISCONTINUED | OUTPATIENT
Start: 2025-07-09 | End: 2025-07-10 | Stop reason: HOSPADM

## 2025-07-09 RX ORDER — PROPOFOL 10 MG/ML
INJECTION, EMULSION INTRAVENOUS
Status: DISCONTINUED | OUTPATIENT
Start: 2025-07-09 | End: 2025-07-10 | Stop reason: SDUPTHER

## 2025-07-09 RX ORDER — OXYCODONE HYDROCHLORIDE 5 MG/1
5 TABLET ORAL EVERY 4 HOURS PRN
Status: DISCONTINUED | OUTPATIENT
Start: 2025-07-09 | End: 2025-07-10 | Stop reason: HOSPADM

## 2025-07-09 RX ORDER — SODIUM CHLORIDE 9 MG/ML
INJECTION, SOLUTION INTRAVENOUS PRN
Status: DISCONTINUED | OUTPATIENT
Start: 2025-07-09 | End: 2025-07-10 | Stop reason: HOSPADM

## 2025-07-09 RX ADMIN — POTASSIUM CHLORIDE 20 MEQ: 1500 TABLET, EXTENDED RELEASE ORAL at 18:51

## 2025-07-09 RX ADMIN — Medication 1000 UNITS: at 18:53

## 2025-07-09 RX ADMIN — GLYCOPYRROLATE 0.2 MG: 0.2 INJECTION INTRAMUSCULAR; INTRAVENOUS at 14:35

## 2025-07-09 RX ADMIN — METOPROLOL SUCCINATE 25 MG: 25 TABLET, EXTENDED RELEASE ORAL at 18:51

## 2025-07-09 RX ADMIN — IPRATROPIUM BROMIDE 0.5 MG: 0.5 SOLUTION RESPIRATORY (INHALATION) at 17:34

## 2025-07-09 RX ADMIN — FUROSEMIDE 40 MG: 20 TABLET ORAL at 18:51

## 2025-07-09 RX ADMIN — SACUBITRIL AND VALSARTAN 1 TABLET: 24; 26 TABLET, FILM COATED ORAL at 18:51

## 2025-07-09 RX ADMIN — SODIUM CHLORIDE: 9 INJECTION, SOLUTION INTRAVENOUS at 13:03

## 2025-07-09 RX ADMIN — SODIUM CHLORIDE: 9 INJECTION, SOLUTION INTRAVENOUS at 14:06

## 2025-07-09 RX ADMIN — SODIUM CHLORIDE 0.5 MCG/KG/HR: 9 INJECTION, SOLUTION INTRAVENOUS at 14:23

## 2025-07-09 RX ADMIN — MUPIROCIN: 20 OINTMENT TOPICAL at 13:02

## 2025-07-09 RX ADMIN — SODIUM CHLORIDE, PRESERVATIVE FREE 10 ML: 5 INJECTION INTRAVENOUS at 20:21

## 2025-07-09 RX ADMIN — ATORVASTATIN CALCIUM 20 MG: 20 TABLET, FILM COATED ORAL at 20:23

## 2025-07-09 RX ADMIN — WATER 2000 MG: 1 INJECTION INTRAMUSCULAR; INTRAVENOUS; SUBCUTANEOUS at 22:52

## 2025-07-09 RX ADMIN — ARFORMOTEROL TARTRATE 15 MCG: 15 SOLUTION RESPIRATORY (INHALATION) at 21:08

## 2025-07-09 RX ADMIN — ONDANSETRON HYDROCHLORIDE 4 MG: 2 SOLUTION INTRAMUSCULAR; INTRAVENOUS at 14:28

## 2025-07-09 RX ADMIN — DEXMEDETOMIDINE HCL 20 MCG: 100 INJECTION INTRAVENOUS at 14:23

## 2025-07-09 RX ADMIN — PROPOFOL 50 MCG/KG/MIN: 10 INJECTION, EMULSION INTRAVENOUS at 14:23

## 2025-07-09 RX ADMIN — LIDOCAINE HYDROCHLORIDE 50 MG: 20 INJECTION, SOLUTION EPIDURAL; INFILTRATION; INTRACAUDAL; PERINEURAL at 14:38

## 2025-07-09 RX ADMIN — PROTAMINE SULFATE 60 MG: 10 INJECTION, SOLUTION INTRAVENOUS at 15:24

## 2025-07-09 RX ADMIN — IPRATROPIUM BROMIDE 0.5 MG: 0.5 SOLUTION RESPIRATORY (INHALATION) at 21:08

## 2025-07-09 RX ADMIN — VASOPRESSIN 1 UNITS: 20 INJECTION INTRAVENOUS at 14:41

## 2025-07-09 RX ADMIN — MUPIROCIN: 20 OINTMENT TOPICAL at 20:23

## 2025-07-09 RX ADMIN — CEFAZOLIN 2 G: 1 INJECTION, POWDER, FOR SOLUTION INTRAMUSCULAR; INTRAVENOUS at 14:24

## 2025-07-09 ASSESSMENT — PAIN - FUNCTIONAL ASSESSMENT: PAIN_FUNCTIONAL_ASSESSMENT: NONE - DENIES PAIN

## 2025-07-09 ASSESSMENT — ENCOUNTER SYMPTOMS: SHORTNESS OF BREATH: 1

## 2025-07-09 ASSESSMENT — PAIN SCALES - GENERAL: PAINLEVEL_OUTOF10: 0

## 2025-07-09 NOTE — ANESTHESIA PROCEDURE NOTES
Arterial Line:    An arterial line was placed using ultrasound guidance and surface landmarks, in the pre-op for the following indication(s): continuous blood pressure monitoring and blood sampling needed.    A 20 gauge (size), 1 and 3/4 inch (length), Arrow (type) catheter was placed, Seldinger technique used, into the left radial artery, secured by tape.  Anesthesia type: Local  Local infiltration: Injection    Events:  patient tolerated procedure well with no complications.    Additional notes:  Well tolerated.  Anesthesiologist: Jaja Waller MD  Performed: Anesthesiologist   Preanesthetic Checklist  Completed: patient identified, IV checked, site marked, risks and benefits discussed, surgical/procedural consents, equipment checked, pre-op evaluation, timeout performed, anesthesia consent given, oxygen available, monitors applied/VS acknowledged, fire risk safety assessment completed and verbalized and blood product R/B/A discussed and consented

## 2025-07-09 NOTE — PLAN OF CARE
Problem: Chronic Conditions and Co-morbidities  Goal: Patient's chronic conditions and co-morbidity symptoms are monitored and maintained or improved  Outcome: Progressing  Flowsheets (Taken 7/9/2025 1717)  Care Plan - Patient's Chronic Conditions and Co-Morbidity Symptoms are Monitored and Maintained or Improved:   Monitor and assess patient's chronic conditions and comorbid symptoms for stability, deterioration, or improvement   Collaborate with multidisciplinary team to address chronic and comorbid conditions and prevent exacerbation or deterioration     Problem: Discharge Planning  Goal: Discharge to home or other facility with appropriate resources  Outcome: Progressing  Flowsheets (Taken 7/9/2025 1717)  Discharge to home or other facility with appropriate resources:   Identify barriers to discharge with patient and caregiver   Arrange for needed discharge resources and transportation as appropriate     Problem: Safety - Adult  Goal: Free from fall injury  Outcome: Progressing  Flowsheets (Taken 7/9/2025 1717)  Free From Fall Injury:   Based on caregiver fall risk screen, instruct family/caregiver to ask for assistance with transferring infant if caregiver noted to have fall risk factors   Instruct family/caregiver on patient safety     Problem: ABCDS Injury Assessment  Goal: Absence of physical injury  Outcome: Progressing  Flowsheets (Taken 7/9/2025 1717)  Absence of Physical Injury: Implement safety measures based on patient assessment     Problem: Pain  Goal: Verbalizes/displays adequate comfort level or baseline comfort level  Outcome: Progressing     Problem: Cardiovascular - Adult  Goal: Maintains optimal cardiac output and hemodynamic stability  Outcome: Progressing  Flowsheets (Taken 7/9/2025 1717)  Maintains optimal cardiac output and hemodynamic stability:   Monitor blood pressure and heart rate   Monitor urine output and notify Licensed Independent Practitioner for values outside of normal  range  Goal: Absence of cardiac dysrhythmias or at baseline  Outcome: Progressing     Problem: Hematologic - Adult  Goal: Maintains hematologic stability  Outcome: Progressing  Flowsheets (Taken 7/9/2025 1717)  Maintains hematologic stability:   Assess for signs and symptoms of bleeding or hemorrhage   Monitor labs for bleeding or clotting disorders

## 2025-07-09 NOTE — PROGRESS NOTES
Patient signed out of anesthesia, discharge criteria met. Patient ALMONTE, alert and oriented x4 on 4L nasal cannula.

## 2025-07-09 NOTE — ANESTHESIA PRE PROCEDURE
degree of luminal narrowing appears to be between  50 and 69%.    Cardiac Cath 4/30/25:   1.  Significant left main coronary artery disease with a deployment of 3.5, 30 mm Sprague frontier drug-eluting stent from ostial left main coronary artery into proximal LAD, there was stepup at the distal edge of the stent, that was treated using 2.5 x 12 mm Sprague frontier drug-eluting stent overlapped with the previously deployed stent  2.  Intracoronary shockwave balloon lithotripsy of left main coronary artery  3.  There was dissection in the ostial and proximal diagonal branch, with a KENNEDY-3 flow distally, treated conservatively with balloon dilation, was not stented in view of causing more damage to very heavily calcified LAD and its branches.    ECHO 04/23/25: Left Ventricle: Mildly reduced left ventricular systolic function with a visually estimated EF of 40 - 45%. Left ventricle size is normal. Moderate basal septal thickening. Severe hypokinesis of the following segments: basal inferior, apical septal and apical inferior. Abnormal diastolic function.    Right Ventricle: Right ventricle is mildly dilated. Moderately reduced systolic function.    Aortic Valve: Classic low flow/low gradient severe aortic stenosis with low EF. AV mean gradient is 21 mmHg. AV peak velocity is 3.5 m/s. LVOT:AV VTI Index is 0.21. AV area by continuity VTI is 0.8 cm2. AV Stroke Volume index is 33.2 mL/m2.    Mitral Valve: Moderate regurgitation.    Tricuspid Valve: Moderate regurgitation.    Left Atrium: Left atrium is dilated.    Image quality is suboptimal. Contrast used: Lumason. Technically difficult study with poor endocardial visualization and technically difficult study due to patient's body habitus.    Atrial fibrillation with slow ventricular response throughout study.    Anesthesia Evaluation  Patient summary reviewed and Nursing notes reviewed   no history of anesthetic complications:   Airway: Mallampati: II  TM distance: >3 FB   Neck

## 2025-07-09 NOTE — OP NOTE
TRANSCATHETER AORTIC VALVE REPLACEMENT  Operative Note      Date of procedure:  7/9/2025    Interventional Cardiologist: Crow Luther MD  Cardiothoracic Surgeon: Mark Anthony Jama DO    Pre-operative diagnosis: Severe symptomatic aortic stenosis.   Post-operative diagnosis: Successful transcatheter aortic valve replacement (TAVR) using a 23-mm LEE 3 Ultra valve.       TAVR access: right common femoral artery percutaneous approach  Pigtail access: left common femoral artery  Appropriate arterial and venous lines were additionally placed by anesthesia as needed.    TAVR access closure: Prostyle Perclose   Pigtail access closure: manual pressure    Procedure:   Arterial access in the TAVR and Pigtail access sites as above  Limited peripheral angiography   Temporary transvenous pacer insertion via left femoral vein   Aortic root angiography  Transfemoral TAVR using a 23-mm LEE 3 Ultra valve      Anesthesia: deep sedation/MAC    Indication for procedure:   Severe symptomatic aortic stenosis    “Severe aortic stenosis Shared Decision Making discussion took place using the CardioSmart/[employer] AS: TAVR/SAVR tool. The treatment plan formulated by the Heart Team and the patient & the patient’s preference for AVR is TAVR”    Description of the Procedure:   Following informed consent, the patient was bought to the [facility] OR and placed under anesthesia as above. Transthoracic echo was used to aid in guidance of the procedure.     Using ultrasound guidance and angiographic confirmation, a a micropuncture needle  was used to access the  and a placeholder sheath was placed; ; this access will be used to deliver the trascatheter heart valve eventually. Using ultrasound guidance, a micropuncture needle was used to access the  and appropriate site was confirmed using angiography and a 5F sheath was placed; this access will be used for the Pigtail catheter subsequently. The  femoral vein was accessed and a

## 2025-07-09 NOTE — PROGRESS NOTES
4 Eyes Skin Assessment     NAME:  Trace Nassar  YOB: 1940  MEDICAL RECORD NUMBER:  [id number]    The patient is being assessed for  Post-Op Surgical    I agree that at least one RN has performed a thorough Head to Toe Skin Assessment on the patient. ALL assessment sites listed below have been assessed.      Areas assessed by both nurses:    Head, Face, Ears, Shoulders, Back, Chest, Arms, Elbows, Hands, Sacrum. Buttock, Coccyx, Ischium, Legs. Feet and Heels, and Under Medical Devices         Does the Patient have a Wound? No noted wound(s)       Vidal Prevention initiated by RN: Yes  Wound Care Orders initiated by RN: NO    Pressure Injury (Stage 1,2,3,4, Unstageable, DTI, NWPT, and Complex wounds) if present, place Wound referral order by RN under : No    New Ostomies, if present place, Ostomy referral order under : No     Nurse 1 eSignature: Electronically signed by ALETA NARAYAN RN on 7/9/25 at 5:09 PM EDT    **SHARE this note so that the co-signing nurse can place an eSignature**    Nurse 2 eSignature: Electronically signed by Maite Cunha RN on 7/9/25 at 5:12 PM EDT

## 2025-07-10 ENCOUNTER — APPOINTMENT (OUTPATIENT)
Age: 85
DRG: 267 | End: 2025-07-10
Attending: INTERNAL MEDICINE
Payer: MEDICARE

## 2025-07-10 VITALS
HEART RATE: 76 BPM | RESPIRATION RATE: 29 BRPM | SYSTOLIC BLOOD PRESSURE: 123 MMHG | OXYGEN SATURATION: 95 % | BODY MASS INDEX: 20.49 KG/M2 | WEIGHT: 120 LBS | TEMPERATURE: 97.5 F | DIASTOLIC BLOOD PRESSURE: 45 MMHG | HEIGHT: 64 IN

## 2025-07-10 LAB
ACTIVATED CLOTTING TIME, LOW RANGE: 154 SEC
ACTIVATED CLOTTING TIME, LOW RANGE: 162 SEC
ACTIVATED CLOTTING TIME, LOW RANGE: 331 SEC
ANION GAP SERPL CALCULATED.3IONS-SCNC: 11 MMOL/L (ref 7–16)
BUN SERPL-MCNC: 25 MG/DL (ref 8–23)
CALCIUM SERPL-MCNC: 8.6 MG/DL (ref 8.8–10.2)
CHLORIDE SERPL-SCNC: 106 MMOL/L (ref 98–107)
CO2 SERPL-SCNC: 23 MMOL/L (ref 22–29)
CREAT SERPL-MCNC: 0.9 MG/DL (ref 0.7–1.2)
ECHO AV AREA PEAK VELOCITY: 1.7 CM2
ECHO AV AREA VTI: 1.8 CM2
ECHO AV CUSP MM: 1.5 CM
ECHO AV MEAN GRADIENT: 8 MMHG
ECHO AV MEAN VELOCITY: 1.3 M/S
ECHO AV PEAK GRADIENT: 16 MMHG
ECHO AV PEAK VELOCITY: 2 M/S
ECHO AV VELOCITY RATIO: 0.45
ECHO AV VTI: 40.8 CM
ECHO EST RA PRESSURE: 3 MMHG
ECHO LA DIAMETER: 5.1 CM
ECHO LV EF PHYSICIAN: 45 %
ECHO LV FRACTIONAL SHORTENING: 17 % (ref 28–44)
ECHO LV INTERNAL DIMENSION DIASTOLIC: 5.2 CM (ref 4.2–5.9)
ECHO LV INTERNAL DIMENSION SYSTOLIC: 4.3 CM
ECHO LV IVSD: 1.2 CM (ref 0.6–1)
ECHO LV IVSS: 1.5 CM
ECHO LV MASS 2D: 207.3 G (ref 88–224)
ECHO LV POSTERIOR WALL DIASTOLIC: 0.9 CM (ref 0.6–1)
ECHO LV POSTERIOR WALL SYSTOLIC: 1 CM
ECHO LV RELATIVE WALL THICKNESS RATIO: 0.35
ECHO LVOT AREA: 3.8 CM2
ECHO LVOT AV VTI INDEX: 0.48
ECHO LVOT DIAM: 2.2 CM
ECHO LVOT MEAN GRADIENT: 2 MMHG
ECHO LVOT PEAK GRADIENT: 3 MMHG
ECHO LVOT PEAK VELOCITY: 0.9 M/S
ECHO LVOT SV: 74.8 ML
ECHO LVOT VTI: 19.7 CM
ECHO RIGHT VENTRICULAR SYSTOLIC PRESSURE (RVSP): 48 MMHG
ECHO RV INTERNAL DIMENSION: 3.1 CM
ECHO TV REGURGITANT MAX VELOCITY: 3.35 M/S
ECHO TV REGURGITANT PEAK GRADIENT: 45 MMHG
EKG ATRIAL RATE: 388 BPM
EKG ATRIAL RATE: 73 BPM
EKG Q-T INTERVAL: 480 MS
EKG Q-T INTERVAL: 566 MS
EKG QRS DURATION: 148 MS
EKG QRS DURATION: 168 MS
EKG QTC CALCULATION (BAZETT): 459 MS
EKG QTC CALCULATION (BAZETT): 500 MS
EKG R AXIS: 15 DEGREES
EKG R AXIS: 28 DEGREES
EKG T AXIS: -41 DEGREES
EKG T AXIS: -43 DEGREES
EKG VENTRICULAR RATE: 47 BPM
EKG VENTRICULAR RATE: 55 BPM
ERYTHROCYTE [DISTWIDTH] IN BLOOD BY AUTOMATED COUNT: 18 % (ref 11.5–15)
GFR, ESTIMATED: 81 ML/MIN/1.73M2
GLUCOSE SERPL-MCNC: 119 MG/DL (ref 74–99)
HCT VFR BLD AUTO: 29.1 % (ref 37–54)
HGB BLD-MCNC: 8.9 G/DL (ref 12.5–16.5)
INR PPP: 1.4
MAGNESIUM SERPL-MCNC: 1.7 MG/DL (ref 1.6–2.4)
MCH RBC QN AUTO: 26.5 PG (ref 26–35)
MCHC RBC AUTO-ENTMCNC: 30.6 G/DL (ref 32–34.5)
MCV RBC AUTO: 86.6 FL (ref 80–99.9)
MICROORGANISM SPEC CULT: ABNORMAL
MICROORGANISM SPEC CULT: ABNORMAL
PLATELET # BLD AUTO: 168 K/UL (ref 130–450)
PMV BLD AUTO: 11.3 FL (ref 7–12)
POTASSIUM SERPL-SCNC: 3.9 MMOL/L (ref 3.5–5.1)
PROTHROMBIN TIME: 15.5 SEC (ref 9.3–12.4)
RBC # BLD AUTO: 3.36 M/UL (ref 3.8–5.8)
SERVICE CMNT-IMP: ABNORMAL
SODIUM SERPL-SCNC: 140 MMOL/L (ref 136–145)
SPECIMEN DESCRIPTION: ABNORMAL
WBC OTHER # BLD: 5.1 K/UL (ref 4.5–11.5)

## 2025-07-10 PROCEDURE — 93010 ELECTROCARDIOGRAM REPORT: CPT | Performed by: INTERNAL MEDICINE

## 2025-07-10 PROCEDURE — 93321 DOPPLER ECHO F-UP/LMTD STD: CPT

## 2025-07-10 PROCEDURE — 2500000003 HC RX 250 WO HCPCS: Performed by: PHYSICIAN ASSISTANT

## 2025-07-10 PROCEDURE — 6360000002 HC RX W HCPCS: Performed by: PHYSICIAN ASSISTANT

## 2025-07-10 PROCEDURE — 6370000000 HC RX 637 (ALT 250 FOR IP): Performed by: PHYSICIAN ASSISTANT

## 2025-07-10 PROCEDURE — 36415 COLL VENOUS BLD VENIPUNCTURE: CPT

## 2025-07-10 PROCEDURE — 93005 ELECTROCARDIOGRAM TRACING: CPT | Performed by: PHYSICIAN ASSISTANT

## 2025-07-10 PROCEDURE — 6370000000 HC RX 637 (ALT 250 FOR IP): Performed by: INTERNAL MEDICINE

## 2025-07-10 PROCEDURE — 80048 BASIC METABOLIC PNL TOTAL CA: CPT

## 2025-07-10 PROCEDURE — 93308 TTE F-UP OR LMTD: CPT | Performed by: INTERNAL MEDICINE

## 2025-07-10 PROCEDURE — 93325 DOPPLER ECHO COLOR FLOW MAPG: CPT | Performed by: INTERNAL MEDICINE

## 2025-07-10 PROCEDURE — 94640 AIRWAY INHALATION TREATMENT: CPT

## 2025-07-10 PROCEDURE — 37799 UNLISTED PX VASCULAR SURGERY: CPT

## 2025-07-10 PROCEDURE — 83735 ASSAY OF MAGNESIUM: CPT

## 2025-07-10 PROCEDURE — 2700000000 HC OXYGEN THERAPY PER DAY

## 2025-07-10 PROCEDURE — 85027 COMPLETE CBC AUTOMATED: CPT

## 2025-07-10 PROCEDURE — 85610 PROTHROMBIN TIME: CPT

## 2025-07-10 PROCEDURE — 93321 DOPPLER ECHO F-UP/LMTD STD: CPT | Performed by: INTERNAL MEDICINE

## 2025-07-10 RX ORDER — MUPIROCIN 2 %
OINTMENT (GRAM) TOPICAL
Qty: 1 G | Refills: 0 | Status: SHIPPED | OUTPATIENT
Start: 2025-07-10

## 2025-07-10 RX ADMIN — WATER 2000 MG: 1 INJECTION INTRAMUSCULAR; INTRAVENOUS; SUBCUTANEOUS at 06:26

## 2025-07-10 RX ADMIN — POTASSIUM CHLORIDE 20 MEQ: 1500 TABLET, EXTENDED RELEASE ORAL at 09:21

## 2025-07-10 RX ADMIN — PANTOPRAZOLE SODIUM 40 MG: 40 TABLET, DELAYED RELEASE ORAL at 06:27

## 2025-07-10 RX ADMIN — CLOPIDOGREL BISULFATE 75 MG: 75 TABLET, FILM COATED ORAL at 09:21

## 2025-07-10 RX ADMIN — APIXABAN 2.5 MG: 2.5 TABLET, FILM COATED ORAL at 09:20

## 2025-07-10 RX ADMIN — FUROSEMIDE 40 MG: 20 TABLET ORAL at 09:19

## 2025-07-10 RX ADMIN — SACUBITRIL AND VALSARTAN 1 TABLET: 24; 26 TABLET, FILM COATED ORAL at 09:21

## 2025-07-10 RX ADMIN — MUPIROCIN: 20 OINTMENT TOPICAL at 09:23

## 2025-07-10 RX ADMIN — IPRATROPIUM BROMIDE 0.5 MG: 0.5 SOLUTION RESPIRATORY (INHALATION) at 07:45

## 2025-07-10 RX ADMIN — SODIUM CHLORIDE, PRESERVATIVE FREE 10 ML: 5 INJECTION INTRAVENOUS at 09:23

## 2025-07-10 RX ADMIN — Medication 1000 UNITS: at 09:23

## 2025-07-10 RX ADMIN — IPRATROPIUM BROMIDE 0.5 MG: 0.5 SOLUTION RESPIRATORY (INHALATION) at 12:22

## 2025-07-10 RX ADMIN — ARFORMOTEROL TARTRATE 15 MCG: 15 SOLUTION RESPIRATORY (INHALATION) at 07:45

## 2025-07-10 ASSESSMENT — PAIN SCALES - GENERAL
PAINLEVEL_OUTOF10: 0
PAINLEVEL_OUTOF10: 0

## 2025-07-10 NOTE — CARE COORDINATION
7/10 Case Management Assessment  Initial Evaluation    Date/Time of Evaluation: 7/10/2025 11:29 AM  Assessment Completed by: Chuck Madrid RN    If patient is discharged prior to next notation, then this note serves as note for discharge by case management.    Patient Name: Trace Nassar                   YOB: 1940  Diagnosis: Nodular calcific aortic valve stenosis [I35.0]                   Date / Time: 7/9/2025 12:14 PM    Patient Admission Status: Inpatient   Readmission Risk (Low < 19, Mod (19-27), High > 27): Readmission Risk Score: 27.8    Current PCP: Silvino Garcia, DO  PCP verified by CM? Yes    Chart Reviewed: Yes      History Provided by: Patient  Patient Orientation: Alert and Oriented    Patient Cognition: Alert    Hospitalization in the last 30 days (Readmission):  Yes    If yes, Readmission Assessment in CM Navigator will be completed.    Advance Directives:      Code Status: Full Code   Patient's Primary Decision Maker is: Legal Next of Kin    Primary Decision Maker: Jared Nassar Alecia Jayda - [phone]    Primary Decision Maker: LIATALDO  Child - [phone]    Discharge Planning:    Patient lives with: Family Members Type of Home:    Primary Care Giver: Family  Patient Support Systems include: Children   Current Financial resources:    Current community resources:    Current services prior to admission:              Current DME:              Type of Home Care services:       ADLS  Prior functional level: Independent in ADLs/IADLs  Current functional level: Independent in ADLs/IADLs    PT AM-PAC:   /24  OT AM-PAC:   /24    Family can provide assistance at DC: Yes  Would you like Case Management to discuss the discharge plan with any other family members/significant others, and if so, who? No  Plans to Return to Present Housing: Yes  Other Identified Issues/Barriers to RETURNING to current housing:     Potential Assistance needed at discharge: N/A            Potential DME:

## 2025-07-10 NOTE — PROGRESS NOTES
**ATTENTION BEDSIDE RN**     Please copy below template, fill in appropriate fields, and place in a progress note.     Testing MUST be completed within but no later than 48 hours of discharge.      Please ambulate patient for a MINIMUM of 6 minutes to ensure accuracy of test.        Pulse ox was 88 % on room air at rest.  Ambulated patient on room air.     Oxygen saturation was 86 % on room air while ambulating. (lowest saturation reached)  Oxygen applied.     Recovery pulse ox was 93% on 1 liters of oxygen while ambulating.

## 2025-07-10 NOTE — CONSULTS
Met with patient and discussed that their physician has ordered a referral to our outpatient Phase II Cardiac Rehabilitation program. Reviewed the benefits of cardiac rehabilitation based on their diagnosis and personal risk factors. Patient demonstrates mild interest in Cardiac Rehabilitation at this time.  Cardiac Rehabilitation brochure provided to patient/family. The Cardiac Rehabilitation Program has been provided the patient's referral information and pertinent patient details and history. The patient may call [facility] Cardiac Rehabilitation at [phone] for additional information or questions. Contact information for [facility] Cardiac Rehabilitation and other choices close to the patient's residence have been provided in the discharge instructions so that the patient may call and schedule an appointment when cleared by their physician. Thank you for the referral.

## 2025-07-10 NOTE — PLAN OF CARE
Problem: Chronic Conditions and Co-morbidities  Goal: Patient's chronic conditions and co-morbidity symptoms are monitored and maintained or improved  7/10/2025 0853 by Bella Roche, RN  Outcome: Progressing  Flowsheets (Taken 7/10/2025 0853)  Care Plan - Patient's Chronic Conditions and Co-Morbidity Symptoms are Monitored and Maintained or Improved:   Update acute care plan with appropriate goals if chronic or comorbid symptoms are exacerbated and prevent overall improvement and discharge   Collaborate with multidisciplinary team to address chronic and comorbid conditions and prevent exacerbation or deterioration   Monitor and assess patient's chronic conditions and comorbid symptoms for stability, deterioration, or improvement     Problem: Discharge Planning  Goal: Discharge to home or other facility with appropriate resources  7/10/2025 0853 by Bella Roche RN  Outcome: Progressing  Flowsheets (Taken 7/10/2025 0853)  Discharge to home or other facility with appropriate resources:   Arrange for interpreters to assist at discharge as needed   Arrange for needed discharge resources and transportation as appropriate   Identify discharge learning needs (meds, wound care, etc)   Refer to discharge planning if patient needs post-hospital services based on physician order or complex needs related to functional status, cognitive ability or social support system   Identify barriers to discharge with patient and caregiver     Problem: Cardiovascular - Adult  Goal: Maintains optimal cardiac output and hemodynamic stability  7/10/2025 0853 by Bella Roche, RN  Outcome: Progressing  Flowsheets (Taken 7/10/2025 0853)  Maintains optimal cardiac output and hemodynamic stability:   Administer vasoactive medications as ordered   Administer fluid and/or volume expanders as ordered   Assess for signs of decreased cardiac output   Monitor urine output and notify Licensed Independent Practitioner for values outside of normal range

## 2025-07-10 NOTE — ANESTHESIA POSTPROCEDURE EVALUATION
Department of Anesthesiology  Postprocedure Note    Patient: Trace Nassar  MRN: [id number]  YOB: 1940  Date of evaluation: 7/10/2025    Procedure Summary       Date: 07/09/25 Room / Location: [address]    Anesthesia Start: 1406 Anesthesia Stop: 1600    Procedures:       TRANSCATHETER AORTIC VALVE REPLACEMENT FEMORAL APPROACH      TRANSCATHETER AORTIC VALVE REPLACEMENT FEMORAL APPROACH/ keep as last patient Diagnosis:       Nodular calcific aortic valve stenosis      (AS)    Surgeons: Crow Luther MD; Mark Anthony Jama DO Responsible Provider: Jaja Waller MD    Anesthesia Type: MAC ASA Status: 4            Anesthesia Type: No value filed.    Lila Phase I: Lila Score: 9    Lila Phase II:      Anesthesia Post Evaluation    Patient location during evaluation: ICU  Patient participation: complete - patient participated  Level of consciousness: awake and alert  Airway patency: patent  Nausea & Vomiting: no nausea and no vomiting  Cardiovascular status: blood pressure returned to baseline and hemodynamically stable  Respiratory status: acceptable  Hydration status: euvolemic  Pain management: adequate    There were no known notable events for this encounter.

## 2025-07-10 NOTE — PLAN OF CARE
Problem: Chronic Conditions and Co-morbidities  Goal: Patient's chronic conditions and co-morbidity symptoms are monitored and maintained or improved  7/10/2025 0155 by Nikki Jo RN  Outcome: Progressing  7/9/2025 1717 by Jada Ospina RN  Outcome: Progressing  Flowsheets (Taken 7/9/2025 1717)  Care Plan - Patient's Chronic Conditions and Co-Morbidity Symptoms are Monitored and Maintained or Improved:   Monitor and assess patient's chronic conditions and comorbid symptoms for stability, deterioration, or improvement   Collaborate with multidisciplinary team to address chronic and comorbid conditions and prevent exacerbation or deterioration     Problem: Discharge Planning  Goal: Discharge to home or other facility with appropriate resources  7/10/2025 0155 by Nikki Jo RN  Outcome: Progressing  7/9/2025 1717 by Jada Ospina RN  Outcome: Progressing  Flowsheets (Taken 7/9/2025 1717)  Discharge to home or other facility with appropriate resources:   Identify barriers to discharge with patient and caregiver   Arrange for needed discharge resources and transportation as appropriate     Problem: Safety - Adult  Goal: Free from fall injury  7/10/2025 0155 by Nikki Jo RN  Outcome: Progressing  7/9/2025 1717 by Jada Ospina RN  Outcome: Progressing  Flowsheets (Taken 7/9/2025 1717)  Free From Fall Injury:   Based on caregiver fall risk screen, instruct family/caregiver to ask for assistance with transferring infant if caregiver noted to have fall risk factors   Instruct family/caregiver on patient safety     Problem: ABCDS Injury Assessment  Goal: Absence of physical injury  7/10/2025 0155 by Nikki Jo RN  Outcome: Progressing  7/9/2025 1717 by Jada Ospina RN  Outcome: Progressing  Flowsheets (Taken 7/9/2025 1717)  Absence of Physical Injury: Implement safety measures based on patient assessment     Problem: Pain  Goal: Verbalizes/displays adequate comfort level or baseline

## 2025-07-10 NOTE — PROGRESS NOTES
Spiritual Health History and Assessment/Progress Note  [facility]    (P) Initial Encounter, Spiritual/Emotional Needs,  ,  ,      Name: Trace Nassar MRN: [id number]    Age: 85 y.o.     Sex: male   Language: English   [employer]: [employer]   Nodular calcific aortic valve stenosis     Date: 7/10/2025                           Spiritual Assessment began in [facility]        Referral/Consult From: (P) Rounding   Encounter Overview/Reason: (P) Initial Encounter, Spiritual/Emotional Needs  Service Provided For: (P) Patient    Jennifer, Belief, Meaning:   Patient identifies as spiritual  Family/Friends No family/friends present      Importance and Influence:  Patient has spiritual/personal beliefs that influence decisions regarding their health  Family/Friends No family/friends present    Community:  Patient is connected with a spiritual community  Family/Friends No family/friends present    Assessment and Plan of Care:     Patient Interventions include: Facilitated expression of thoughts and feelings  Family/Friends Interventions include: No family/friends present    Patient Plan of Care: No spiritual needs identified for follow-up  Family/Friends Plan of Care: No family/friends present    Electronically signed by Chaplain RAMON on 7/10/2025 at 4:39 PM

## 2025-07-10 NOTE — CARE COORDINATION
7/10 Care Coordination:pt in [facility] POD[facility]1 S/P TAVR. Remains on 1 liters O2.   CM spoke with pt in his room. Introduced self and role of CM.  PTA pt was at home, Lives alone but has 2 sons that help. Pt has a Hx at [facility] in June, also hx of [facility] for [facility]. Per pt discharge plan is Home.CM spoke with his son Jared. He is active with [facility], Will get [facility] orders. Son will transport.  HUBER/HAYDE will continue to follow for discharge planning.   Chuck SAEZN,RN--BC  [phone]

## 2025-07-13 LAB
ABO/RH: NORMAL
ANTIBODY SCREEN: NEGATIVE
ARM BAND NUMBER: NORMAL
BLOOD BANK DISPENSE STATUS: NORMAL
BLOOD BANK SAMPLE EXPIRATION: NORMAL
BPU ID: NORMAL
COMPONENT: NORMAL
CROSSMATCH RESULT: NORMAL
TRANSFUSION STATUS: NORMAL
UNIT DIVISION: 0

## 2025-07-17 NOTE — DISCHARGE SUMMARY
[facility]              [address] [place] [address]                            DISCHARGE SUMMARY      PATIENT NAME: ALYSHA JOSUE        : 1940  MED REC NO: [id number]                        ROOM: 6503  ACCOUNT NO: [id number]                       ADMIT DATE: 2025  PROVIDER: Steevn Nava DO      DISCHARGE DIAGNOSES:  Acute respiratory failure with hypoxia, pneumonia, dysphagia, status post recent abdominal aortic aneurysm repair, left leg pain secondary to peripheral artery disease, severe aortic stenosis, coronary artery disease, osteoarthritis, elevated cholesterol, chronic anticoagulation, hypertension, chronic obstructive pulmonary disease, 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    HOSPITAL COURSE:  The patient is an 85-year-old  male who presented to the emergency room with complaints of bilateral leg swelling.  He also had severe left leg pain.  He is status post recent abdominal aortic aneurysm repair, has a history of aortic stenosis.  Diagnostic evaluation revealed findings consistent with CHF and pneumonia.  He is admitted to the hospital, seen by Pulmonary, Cardiology, Vascular.  He was treated with antibiotics, diuresis.  He was given a modified textured diet.  The patient's status slowly improved and he was eventually discharged to home in stable condition on 2025 on oxygen.    DISCHARGE MEDICATIONS:  On the day of discharge, include furosemide, Entresto, Plavix, Eliquis, Lipitor, Protonix.    DISCHARGE INSTRUCTIONS:  The patient instructed to follow up with Dr. Garcia, call office for appointment.  Follow up with Pulmonary and Cardiology, call office for appointment.  Follow up with Vascular, call office for appointment.          STEVEN NAVA DO      D:  2025 19:55:29     T:  2025 20:25:04     KODY/CHELSEA  Job #:  [id number]     Doc#:  [id number]

## 2025-07-18 ENCOUNTER — TELEPHONE (OUTPATIENT)
Age: 85
End: 2025-07-18

## 2025-07-18 NOTE — TELEPHONE ENCOUNTER
Left voicemail with son Vas reminding him of patient's appointment with Jing Xiong on 7/22/25, 1100.

## 2025-07-21 ENCOUNTER — HOSPITAL ENCOUNTER (OUTPATIENT)
Dept: CT IMAGING | Age: 85
Discharge: HOME | End: 2025-07-23
Attending: INTERNAL MEDICINE
Payer: MEDICARE

## 2025-07-21 DIAGNOSIS — J18.9 PNEUMONIA DUE TO INFECTIOUS ORGANISM, UNSPECIFIED LATERALITY, UNSPECIFIED PART OF LUNG: ICD-10-CM

## 2025-07-21 PROCEDURE — 71250 CT THORAX DX C-: CPT

## 2025-07-22 ENCOUNTER — HOSPITAL ENCOUNTER (OUTPATIENT)
Dept: OTHER | Age: 85
Setting detail: THERAPIES SERIES
Discharge: HOME | End: 2025-07-22
Payer: MEDICARE

## 2025-07-22 ENCOUNTER — OFFICE VISIT (OUTPATIENT)
Dept: CARDIOLOGY CLINIC | Age: 85
End: 2025-07-22

## 2025-07-22 VITALS
HEART RATE: 68 BPM | SYSTOLIC BLOOD PRESSURE: 124 MMHG | RESPIRATION RATE: 18 BRPM | WEIGHT: 121 LBS | BODY MASS INDEX: 20.77 KG/M2 | DIASTOLIC BLOOD PRESSURE: 59 MMHG | OXYGEN SATURATION: 95 %

## 2025-07-22 VITALS
SYSTOLIC BLOOD PRESSURE: 98 MMHG | OXYGEN SATURATION: 93 % | WEIGHT: 120 LBS | BODY MASS INDEX: 20.49 KG/M2 | HEART RATE: 65 BPM | HEIGHT: 64 IN | DIASTOLIC BLOOD PRESSURE: 48 MMHG

## 2025-07-22 DIAGNOSIS — Z98.61 CAD S/P PERCUTANEOUS CORONARY ANGIOPLASTY: ICD-10-CM

## 2025-07-22 DIAGNOSIS — Z95.2 HISTORY OF TRANSCATHETER AORTIC VALVE REPLACEMENT (TAVR): Primary | ICD-10-CM

## 2025-07-22 DIAGNOSIS — I50.22 HEART FAILURE WITH MID-RANGE EJECTION FRACTION (HFMEF) (HCC): ICD-10-CM

## 2025-07-22 DIAGNOSIS — I25.10 CAD S/P PERCUTANEOUS CORONARY ANGIOPLASTY: ICD-10-CM

## 2025-07-22 DIAGNOSIS — I35.0 NONRHEUMATIC AORTIC VALVE STENOSIS: ICD-10-CM

## 2025-07-22 LAB
ANION GAP SERPL CALCULATED.3IONS-SCNC: 12 MMOL/L (ref 7–16)
BNP SERPL-MCNC: 9375 PG/ML (ref 0–450)
BUN SERPL-MCNC: 36 MG/DL (ref 8–23)
CALCIUM SERPL-MCNC: 9.8 MG/DL (ref 8.8–10.2)
CHLORIDE SERPL-SCNC: 103 MMOL/L (ref 98–107)
CO2 SERPL-SCNC: 27 MMOL/L (ref 22–29)
CREAT SERPL-MCNC: 1.2 MG/DL (ref 0.7–1.2)
GFR, ESTIMATED: 59 ML/MIN/1.73M2
GLUCOSE SERPL-MCNC: 137 MG/DL (ref 74–99)
POTASSIUM SERPL-SCNC: 5.5 MMOL/L (ref 3.5–5.1)
SODIUM SERPL-SCNC: 141 MMOL/L (ref 136–145)

## 2025-07-22 PROCEDURE — 99214 OFFICE O/P EST MOD 30 MIN: CPT | Performed by: PHYSICIAN ASSISTANT

## 2025-07-22 PROCEDURE — 83880 ASSAY OF NATRIURETIC PEPTIDE: CPT

## 2025-07-22 PROCEDURE — 1159F MED LIST DOCD IN RCRD: CPT | Performed by: PHYSICIAN ASSISTANT

## 2025-07-22 PROCEDURE — 80048 BASIC METABOLIC PNL TOTAL CA: CPT

## 2025-07-22 PROCEDURE — 1123F ACP DISCUSS/DSCN MKR DOCD: CPT | Performed by: PHYSICIAN ASSISTANT

## 2025-07-22 PROCEDURE — 99204 OFFICE O/P NEW MOD 45 MIN: CPT

## 2025-07-22 NOTE — PLAN OF CARE
Problem: Chronic Conditions and Co-morbidities  Goal: Patient's chronic conditions and co-morbidity symptoms are monitored and maintained or improved  Flowsheets (Taken 7/22/2025 1402)  Care Plan - Patient's Chronic Conditions and Co-Morbidity Symptoms are Monitored and Maintained or Improved: Monitor and assess patient's chronic conditions and comorbid symptoms for stability, deterioration, or improvement

## 2025-07-22 NOTE — PATIENT INSTRUCTIONS
Follow up on 8/12/25 for echo and visit with Jing; call sooner if concerns arise in the meantime

## 2025-07-22 NOTE — PROGRESS NOTES
[facility]   [facility]      Referring Provider: -  Primary Care Physician: Silvino Garcia DO   Cardiologist: Dr. Layne  Nephrologist: -      HISTORY OF PRESENT ILLNESS:     Trace Nassar is a 85 y.o. (1940) male with a history of HFrEF (EF < 40%)  Pre Cupid:     Lab Results   Component Value Date    LVEF 40 04/23/2025     Post Cupid:  No results found for: \"EFBP\"      He presents to the CHF clinic for ongoing evaluation and monitoring of heart failure.    In the CHF clinic today he denies any adverse symptoms except:  [] Dizziness or lightheadedness   [] Syncope or near syncope  [] Recent Fall  [] Shortness of breath at rest   [] Dyspnea with exertion  [] Decline in functional capacity (unable to perform activities they had previously been able to do)  [] Fatigue   [] Orthopnea  [] PND  [] Nocturnal cough  [] Hemoptysis  [] Chest pain, pressure, or discomfort  [] Palpitations  [] Abdominal distention  [] Abdominal bloating  [] Early satiety  [] Blood in stool   [] Diarrhea  [] Constipation  [] Nausea/Vomiting  [] Decreased urinary response to oral diuretic   [] Scrotal swelling   [] Lower extremity edema  [] Used PRN doses of oral diuretic   [] Weight gain    Wt Readings from Last 3 Encounters:   07/22/25 54.9 kg (121 lb)   07/22/25 54.4 kg (120 lb)   07/07/25 54.4 kg (120 lb)       SOCIAL HISTORY:  [x] Denies tobacco, alcohol or illicit drug abuse  [] Tobacco use:  [] ETOH use:  [] Illicit drug use:        MEDICATIONS:    Allergies   Allergen Reactions    Penicillins      Has had cephalosporins in the past.    Sulfa Antibiotics      Prior to Visit Medications    Medication Sig Taking? Authorizing Provider   metoprolol succinate (TOPROL XL) 25 MG extended release tablet Take 1 tablet by mouth daily Yes Onelia Wallace PA   clopidogrel (PLAVIX) 75 MG tablet Take 1 tablet by mouth daily Yes Onelia Wallace PA

## 2025-07-23 ENCOUNTER — TELEPHONE (OUTPATIENT)
Dept: OTHER | Age: 85
End: 2025-07-23

## 2025-07-27 LAB
EKG ATRIAL RATE: 71 BPM
EKG Q-T INTERVAL: 440 MS
EKG QRS DURATION: 152 MS
EKG QTC CALCULATION (BAZETT): 495 MS
EKG R AXIS: 10 DEGREES
EKG T AXIS: -47 DEGREES
EKG VENTRICULAR RATE: 76 BPM

## 2025-07-28 NOTE — PROGRESS NOTES
[facility]  Visit Note      Patient name: Trace Nassar    Reason for consult: TAVR follow up    Referring Physician: Dr. Layne    Primary Care Physician: Silvino Garcia DO    Date of service: 7/22/2025    Chief Complaint: TAVR follow up - groin check    HPI: Mr. Nassar presents for follow up s/p TAVR on 7/9/25. He is doing well since the procedure. He is in a rehab facility and slowly working on conditioning. He denies any groin concerns or any other complaints today.    Allergies:   Allergies   Allergen Reactions    Penicillins      Has had cephalosporins in the past.    Sulfa Antibiotics        Home medications:    Current Outpatient Medications   Medication Sig Dispense Refill    metoprolol succinate (TOPROL XL) 25 MG extended release tablet Take 1 tablet by mouth daily 30 tablet 5    clopidogrel (PLAVIX) 75 MG tablet Take 1 tablet by mouth daily 30 tablet 5    magnesium hydroxide (MILK OF MAGNESIA) 400 MG/5ML suspension Take by mouth daily as needed for Constipation      acetaminophen (TYLENOL) 500 MG tablet Take 1 tablet by mouth every 6 hours as needed for Pain      apixaban (ELIQUIS) 2.5 MG TABS tablet Take 1 tablet by mouth 2 times daily 60 tablet 0    furosemide (LASIX) 40 MG tablet Take 1 tablet by mouth daily 60 tablet 0    potassium chloride (KLOR-CON M) 20 MEQ extended release tablet Take 1 tablet by mouth daily 30 tablet 0    atorvastatin (LIPITOR) 20 MG tablet Take 1 tablet by mouth daily      vitamin D3 (CHOLECALCIFEROL) 25 MCG (1000 UT) TABS tablet Take 1 tablet by mouth daily      umeclidinium-vilanterol (ANORO ELLIPTA) 62.5-25 MCG/ACT inhaler Inhale 1 puff into the lungs daily 1 each 3    sacubitril-valsartan (ENTRESTO) 24-26 MG per tablet Take 1 tablet by mouth 2 times daily 60 tablet 0    pantoprazole (PROTONIX) 40 MG tablet Take 1 tablet by mouth every morning (before breakfast). 30 tablet 3    mupirocin (BACTROBAN) 2 % ointment Apply topically 3 times daily. 1 g 0

## 2025-07-30 NOTE — DISCHARGE SUMMARY
[facility]    DISCHARGE SUMMARY    DEMOGRAPHICS Trace ZENG Patris / 1940 (85 y.o.) / MRN [id number]   ADMIT / DC DATE 7/9/2025 - 7/10/2025 (LOS : 1)   ATTENDING MD No att. providers found   PRIMARY CARDIOLOGY: Dr. Layne  PRIMARY CARE Silvino Garcia DO      REASON FOR ADMIT: TAVR     DISPOSITION Home   PLANNED READMIT: no         PRIMARY DISCHARGE DIAGNOSIS   1. S/p TAVR       SECONDARY DISCHARGE DIAGNOSIS    Need for lifelong endocarditis prophylaxis             ** Implanted S3UR is MRI compatible **     ** Prophylactic antibiotics required before dental work and avoid cleanings for 8 weeks post-procedure **         CONSULTANTS : cardiac rehab         SUMMARY OF PROCEDURES/STUDIES (see individual procedure notes for more details)    1. Successful R TF TAVR with 23 mm Nell 3 Ultra Resilia Valve (+3cc)  -No PVL   2. Final LVEDP 14 mmHg     TTE POD1:    Left Ventricle: Moderately reduced left ventricular systolic function with a visually estimated EF of 40 - 45%. Left ventricle size is normal. Mild septal thickening. Moderate global hypokinesis present.    Right Ventricle: Right ventricle size is normal. Normal systolic function.    Aortic Valve: Lyman Nell S3UR bioprosthetic valve that is well-seated with a size of 23 mm. AV mean gradient is 8 mmHg. Trace paravalvular regurgitation. AV Area by VTI is 1.8 cm2.    Mitral Valve: There is mild annular calcification noted. Moderate regurgitation.    Tricuspid Valve: Mild regurgitation.    Left Atrium: Left atrium is severely dilated.    Right Atrium: Right atrium is dilated.    Image quality is adequate.      FOLLOW UP APPOINTMENTS   Future Appointments   Date Time Provider Department Center   8/5/2025  1:00 PM Farrukh Marmolejo MD VASC[facility]   8/7/2025 11:20 AM Lalit Varghese APRN - CNS [facility]   8/12/2025 10:00 AM AMOR MELGOZA ECHO 1 [facility]   8/12/2025 11:00 AM Jing Xiong PA [facility]

## 2025-07-30 NOTE — PROGRESS NOTES
Physician Progress Note      PATIENT:               ALYSHA JOSUE  [facility] #:                  [id number]  :                       1940  ADMIT DATE:       2025 12:14 PM  DISCH DATE:        7/10/2025 4:15 PM  RESPONDING  PROVIDER #:        Crow Luther MD          QUERY TEXT:    Based on your medical judgment, please clarify these findings and document if   any of the following are being evaluated and/or treated:    The clinical indicators include:  -86 yo male, PMH HTN, CAD, ICM, HLD, DM, admitted for Severe aortic stenosis   planned TAVR (H&P  by ROSELINE Xiong PA-C, gamaliel Luther)  -\"Left Ventricle: Mild to moderately reduced left ventricular systolic   function with a visually estimated EF of 40 - 45%. Left ventricle size is   normal. Normal wall thickness. Moderate global hypokinesis present. Normal   diastolic function.\" (Echo 7/10)  -PO Lasix (-7/10), metoprolol (), Entresto (-7/10) (MAR)  Options provided:  -- Chronic Systolic CHF/HFrEF  -- Chronic Systolic and Diastolic CHF  -- Chronic Diastolic CHF/HFpEF  -- Other - I will add my own diagnosis  -- Disagree - Not applicable / Not valid  -- Disagree - Clinically unable to determine / Unknown  -- Refer to Clinical Documentation Reviewer    PROVIDER RESPONSE TEXT:    This patient has chronic systolic CHF/HFrEF.    Query created by: Isidro Lopez on 2025 11:47 AM      QUERY TEXT:    Patient noted to have PMH Afib per H&P and noted to be on Eliquis per MAR.   Based on your medical judgment, please clarify these findings and document if   any of the following are being evaluated and/or treated:    The clinical indicators include:  -86 yo male, PMH HTN, ICM, HLD, DM, and AFib, \"TTE 25...Atrial   fibrillation with slow ventricular response throughout study.\" (per H&P  by   ROSELINE Xiong PA-C, gamaliel Luther)  -Eliquis (7/10 MAR)  Options provided:  -- Secondary hypercoagulable state in a patient with atrial fibrillation  --

## 2025-08-05 ENCOUNTER — OFFICE VISIT (OUTPATIENT)
Dept: VASCULAR SURGERY | Age: 85
End: 2025-08-05

## 2025-08-05 ENCOUNTER — TELEPHONE (OUTPATIENT)
Dept: CARDIOLOGY CLINIC | Age: 85
End: 2025-08-05

## 2025-08-05 DIAGNOSIS — Z95.828 HISTORY OF ENDOVASCULAR STENT GRAFT FOR ABDOMINAL AORTIC ANEURYSM: Primary | ICD-10-CM

## 2025-08-05 PROCEDURE — 99024 POSTOP FOLLOW-UP VISIT: CPT | Performed by: SURGERY

## 2025-08-08 ENCOUNTER — TELEPHONE (OUTPATIENT)
Age: 85
End: 2025-08-08

## 2025-08-08 DIAGNOSIS — I48.91 ATRIAL FIBRILLATION, UNSPECIFIED TYPE (HCC): ICD-10-CM

## 2025-08-08 PROBLEM — Z01.812 PRE-OPERATIVE LABORATORY EXAMINATION: Status: RESOLVED | Noted: 2025-07-09 | Resolved: 2025-08-08

## 2025-08-11 DIAGNOSIS — Z95.2 HISTORY OF TRANSCATHETER AORTIC VALVE REPLACEMENT (TAVR): Primary | ICD-10-CM

## 2025-08-13 ENCOUNTER — TELEPHONE (OUTPATIENT)
Age: 85
End: 2025-08-13

## 2025-08-14 ENCOUNTER — TELEPHONE (OUTPATIENT)
Dept: OTHER | Age: 85
End: 2025-08-14

## (undated) DEVICE — CATHETER GUID 6FR L100CM DIA0.071IN NYL SHFT EBU3.5

## (undated) DEVICE — Device

## (undated) DEVICE — SYRINGE MED 20ML STD CLR PLAS LUERLOCK TIP N CTRL DISP

## (undated) DEVICE — INTRODUCER SHTH 6FR L12CM DIA0.038IN HEMSTAS CLOSE TOL

## (undated) DEVICE — GLOVE SURG SZ 7.5 L11.73IN FNGR THK9.8MIL STRW LTX POLYMER

## (undated) DEVICE — GUIDEWIRE VASC L260CM DIA0.035IN TAPR L11CM FLPY TIP L4CM

## (undated) DEVICE — CATH BLLN ANGIO 2.50X15MM SC EUPHORA RX

## (undated) DEVICE — CATHETER IVL C2PLUS SHOCKWAVE 3.5MM X 12MM

## (undated) DEVICE — TUBING PRSS MON L12IN PVC RIG NONEXPANDING M TO FEM CONN

## (undated) DEVICE — BAND COMPR L24CM REG CLR PLAS HEMSTAT EXT HK AND LOOP RETEN

## (undated) DEVICE — DEVICE TORQ FLRESCNT PNK FOR HEMSTAS VLV

## (undated) DEVICE — CATHETER DIAG 6FR L100CM LUMN ID0.056IN JR4 CRV 0 SIDE H

## (undated) DEVICE — AMPLATZ EXTRA STIFF WIRE GUIDE: Brand: AMPLATZ

## (undated) DEVICE — COVER,TABLE,60X90,STERILE: Brand: MEDLINE

## (undated) DEVICE — LOOP VES W25MM THK1MM MAXI RED SIL FLD REPELLENT 100 PER

## (undated) DEVICE — GOWN,SIRUS,FABRNF,XL,20/CS: Brand: MEDLINE

## (undated) DEVICE — KIT MED IMAG CNTRST AGNT W/ IOPAMIDOL REUSE

## (undated) DEVICE — PERCLOSE™ PROSTYLE™ SUTURE-MEDIATED CLOSURE AND REPAIR SYSTEM: Brand: PERCLOSE™ PROSTYLE™

## (undated) DEVICE — MEDIA CONTRAST ISOVUE 300 100ML

## (undated) DEVICE — CATHETER GUID 6FR L150CM RAP EXCHG L25CM TIP 5.1FR PUSHROD

## (undated) DEVICE — COVER,LIGHT HANDLE,FLX,2/PK: Brand: MEDLINE INDUSTRIES, INC.

## (undated) DEVICE — ELECTRODE PT RET AD L9FT HI MOIST COND ADH HYDRGEL CORDED

## (undated) DEVICE — PICO 7 10CM X 30CM: Brand: PICO™ 7

## (undated) DEVICE — OPTIVIEW, SACRUM, 9"X9": Brand: MEDLINE

## (undated) DEVICE — BLADE CLIPPER GEN PURP NS

## (undated) DEVICE — ENDOVASCULAR: Brand: MEDLINE INDUSTRIES, INC.

## (undated) DEVICE — KIT MFLD ISOLATN NACL CNTRST PRT TBNG SPIK W/ PRSS TRNSDUC

## (undated) DEVICE — PINNACLE INTRODUCER SHEATH: Brand: PINNACLE

## (undated) DEVICE — GUIDEWIRE VASC L260CM DIA0.035IN L7CM DIA3MM J TIP PTFE S

## (undated) DEVICE — GUIDEWIRE VASC L260CM DIA0.035IN BRECKER FOR COREVLV

## (undated) DEVICE — GOWN,SIRUS,FABRNF,L,20/CS: Brand: MEDLINE

## (undated) DEVICE — BEACON TIP TORCON NB ADVANTAGE CATHETER: Brand: BEACON TIP TORCON NB

## (undated) DEVICE — MEDIA CONTRAST ISOVUE 370 100ML

## (undated) DEVICE — DRAPE SHEET: Brand: UNBRANDED

## (undated) DEVICE — GLOVE SURG 7 PF POLYMER COAT WHT STRL SIGN LTX ESSENTIAL LTX

## (undated) DEVICE — CATH BLLN ANGIO 2X12MM SC EUPHORA RX

## (undated) DEVICE — GLIDESHEATH SLENDER ACCESS KIT: Brand: GLIDESHEATH SLENDER

## (undated) DEVICE — TAPE ADH W2INXL10YD PLAS TRNSPAR H2O RESIST HYPOALRG CURAD

## (undated) DEVICE — SHEATH INTRO 16FR L33CM OD6.1MM ID5.3MM HYDRPHLC KINK

## (undated) DEVICE — CANNULA NSL CANN NSL L25FT TBNG AD O2 SUP SFT UC

## (undated) DEVICE — PERCUTANEOUS ENTRY THINWALL NEEDLE  ONE-PART: Brand: COOK

## (undated) DEVICE — CATHETER GUID 7FR DIA0.081IN SHFT NYL BKUP SUPP L EBU 3.5

## (undated) DEVICE — ANGIOPLASTY ADD-ON PACK: Brand: MEDLINE INDUSTRIES, INC.

## (undated) DEVICE — BASIC: Brand: MEDLINE INDUSTRIES, INC.

## (undated) DEVICE — CATHETER DIAG 5FR L100CM SPEC 3 DRC CRV SZ DBL BRAID WIRE

## (undated) DEVICE — WIPES SKIN CLOTH READYPREP 9 X 10.5 IN 2% CHLORHEX GLUCONATE CHG PREOP

## (undated) DEVICE — GUIDEWIRE VASC L260CM 0.035IN J TIP L3MM PTFE FIX COR NAMIC

## (undated) DEVICE — 18 GA N.G. KIT, 10 PACK: Brand: SITE-RITE

## (undated) DEVICE — TUBING ANGIO L48IN POLYUR HI PRSS 1200PSI AIRLESS ROT ADPT

## (undated) DEVICE — ANGIOGRAPHIC CATHETER: Brand: EXPO™

## (undated) DEVICE — CATHETER DIAG 5FR L100CM LUMN ID0.047IN JR4 CRV 0 SIDE H

## (undated) DEVICE — GLIDESHEATH NITINOL HYDROPHILIC COATED INTRODUCER SHEATH: Brand: GLIDESHEATH

## (undated) DEVICE — NDLHOLDER F/P WEBSTER FN8064 S: Brand: CENTURION MEDICAL PRODUCTS CORP

## (undated) DEVICE — CATHETER PACE 5FR L110CM 6FR INTRO D10CM 1MM SPACE POLYUR

## (undated) DEVICE — GUIDEWIRE VASCULAR L145CM 0.035IN J TIP L3MM PTFE FIX COR NAMIC

## (undated) DEVICE — BLADE,STAINLESS-STEEL,15,STRL,DISPOSABLE: Brand: MEDLINE

## (undated) DEVICE — LABEL MED CARD SURG 4 IN PANEL STRL

## (undated) DEVICE — CATH BLLN ANGIO 4X8MM NC EUPHORIA RX

## (undated) DEVICE — NEEDLE SPNL 20GA L3.5IN YEL HUB S STL REG WALL FIT STYL

## (undated) DEVICE — PTA BALLOON DILATATION CATHETER: Brand: MUSTANG™

## (undated) DEVICE — COPILOT BLEEDBACK CONTROL VALVE: Brand: COPILOT

## (undated) DEVICE — SOLUTION IV 1000ML 0.9% SOD CHL PH 5 INJ USP VIAFLX PLAS

## (undated) DEVICE — CATHETER VASC DIAG MOD PERIPH W/O HYDRPHLC COAT AD

## (undated) DEVICE — MICROPUNCTURE INTRODUCER SET SILHOUETTE TRANSITIONLESS PUSH-PLUS DESIGN - STIFFENED CANNULA WITH STAINLESS STEEL WIRE GUIDE: Brand: MICROPUNCTURE

## (undated) DEVICE — CATHETER COR DIAG JUDKINS L 3.5 CRV 6FR 100CM 0 SIDE H

## (undated) DEVICE — RUNTHROUGH NS EXTRA FLOPPY PTCA GUIDEWIRE: Brand: RUNTHROUGH

## (undated) DEVICE — ELECTRODE ES AD PED L2.5IN TEF INSUL MOD NONCORDED BLDE TIP

## (undated) DEVICE — BOWL,STERILE,LARGE,32 OZ: Brand: MEDLINE

## (undated) DEVICE — RADIFOCUS GLIDEWIRE: Brand: GLIDEWIRE

## (undated) DEVICE — SURGICAL PROCEDURE KIT 3 W

## (undated) DEVICE — CATH BLLN ANGIO 3X15MM NC EUPHORIA RX

## (undated) DEVICE — SYRINGE MED 10ML LUERLOCK TIP W/O SFTY DISP

## (undated) DEVICE — BLADE,STAINLESS-STEEL,10,STRL,DISPOSABLE: Brand: MEDLINE

## (undated) DEVICE — SHEATH INTRO 8FR L12CM GWIRE 0.038IN W/ SMOOTH TAPERS TIP

## (undated) DEVICE — DRAPE EQUIP BANDED BG 36X28 IN W/ROUNDED CORNER SNAPKOVER

## (undated) DEVICE — CATH BLLN ANGIO 3.50X15MM NC EUPHORA RX

## (undated) DEVICE — BLADE,STAINLESS-STEEL,11,STRL,DISPOSABLE: Brand: MEDLINE

## (undated) DEVICE — LARGE BORE STOPCOCK WITH ROTATING MALE LUER LOCK

## (undated) DEVICE — CATHETER,URETHRAL,REDRUBBER,STERILE,22FR: Brand: MEDLINE

## (undated) DEVICE — DOUBLE BASIN SET: Brand: MEDLINE INDUSTRIES, INC.

## (undated) DEVICE — SODIUM CHL 09% SOL EXCEL 1000ML

## (undated) DEVICE — SET INTRO SHTH 5FR L45CM GRY INTEGR SIDE PRT HAEMOSTASIS

## (undated) DEVICE — CODA, LP BALLOON CATHETER: Brand: CODA

## (undated) DEVICE — GLOVE ORANGE PI 7   MSG9070

## (undated) DEVICE — GAUZE,SPONGE,4"X4",8PLY,STRL,LF,10/TRAY: Brand: MEDLINE

## (undated) DEVICE — GLOVE ORANGE PI 7 1/2   MSG9075

## (undated) DEVICE — DRAPE,REIN 53X77,STERILE: Brand: MEDLINE

## (undated) DEVICE — CATH BLLN ANGIO 3.50X12MM NC EUPHORIA RX

## (undated) DEVICE — GUIDEWIRE ANGIO L150CM OD0.035IN STR FIX PTFE SLD SUPP

## (undated) DEVICE — DRESSING TRNSPAR W4XL55IN ACRYL SUP FLM W ADH WTRPRF OPSITE

## (undated) DEVICE — CATHETER ANGIO 5FR L100CM ID0.045IN AL1 CRV ROBUST SHFT

## (undated) DEVICE — CATHETER DIAG 5FR L110CM PIG CRV SZ 6 SIDE H DBL BRAID WIRE

## (undated) DEVICE — DRAPE, MULTI FENESTRATED, HYBRID OR, STE: Brand: MEDLINE

## (undated) DEVICE — HI-TORQUE BALANCE MIDDLEWEIGHT GUIDE WIRE W/HYDROCOAT .014 STRAIGHT TIP 3.0 CM X 190 CM: Brand: HI-TORQUE BALANCE MIDDLEWEIGHT

## (undated) DEVICE — TOWEL,OR,DSP,ST,BLUE,STD,6/PK,12PK/CS: Brand: MEDLINE

## (undated) DEVICE — INFLATION DEVICE KIT: Brand: ENCORE™ 26 ADVANTAGE KIT

## (undated) DEVICE — MEDI-TRACE CADENCE ADULT, PRE-CONNECT  DEFIBRILLATION ELECTRODE (10 PR/PK) - PHYSIO-CONTROL: Brand: MEDI-TRACE CADENCE

## (undated) DEVICE — 5F (1.0MM ID) X 9CM STIFF5F (1.0 MICRO-STICK®INTRODUCER SE WITH NITINOL GUIDEWIREWITH NITIN WITH RADIOPAQUE TIPWITH RADIOPAQ: Brand: MICRO-STICK SETMICRO-STICK SET

## (undated) DEVICE — GLOVE SURG SZ 65 THK91MIL LTX FREE SYN POLYISOPRENE

## (undated) DEVICE — SYRINGE MED 20ML STD CLR PLAS LUERSLIP TIP N CTRL DISP

## (undated) DEVICE — APPLICATOR MEDICATED 26 CC SOLUTION HI LT ORNG CHLORAPREP

## (undated) DEVICE — SET TRNQT W/ STD 7IN 12FR 5 TB 1 SNR DLP

## (undated) DEVICE — KIT HND CTRL AT-XL65

## (undated) DEVICE — SOLUTION IV MULT ELECLYT 1000 ML PH 7.4 INJ ISOLYTE S

## (undated) DEVICE — 3M™ IOBAN™ 2 ANTIMICROBIAL INCISE DRAPE 6650EZ: Brand: IOBAN™ 2

## (undated) DEVICE — BENTSON WIRE GUIDE 20CM DISTAL FLEXIBILITY WITH SOFTENED TIP: Brand: BENTSON

## (undated) DEVICE — PAD, DEFIB, ADULT, RADIOTRAN, PHYSIO, LO: Brand: MEDLINE

## (undated) DEVICE — TOWEL,OR,DSP,ST,WHITE,DLX,4/PK,20PK/CS: Brand: MEDLINE

## (undated) DEVICE — SYRINGE MED 50ML LUERLOCK TIP

## (undated) DEVICE — BLADE,STAINLESS-STEEL,20,STRL,DISPOSABLE: Brand: MEDLINE

## (undated) DEVICE — COVER PRB W14XL147CM TELESCOPICALLY FLD EXT LEN CIV-FLEX

## (undated) DEVICE — RADIFOCUS GLIDEWIRE ADVANTAGE GUIDEWIRE: Brand: GLIDEWIRE ADVANTAGE

## (undated) DEVICE — INTRODUCER SHTH 0.035 IN 4 FRX11 CM W/ GUIDEWIRE SUPER SHTH